# Patient Record
Sex: FEMALE | Race: WHITE | NOT HISPANIC OR LATINO | Employment: FULL TIME
[De-identification: names, ages, dates, MRNs, and addresses within clinical notes are randomized per-mention and may not be internally consistent; named-entity substitution may affect disease eponyms.]

---

## 2019-09-29 ENCOUNTER — HEALTH MAINTENANCE LETTER (OUTPATIENT)
Age: 69
End: 2019-09-29

## 2019-12-27 ENCOUNTER — ANCILLARY PROCEDURE (OUTPATIENT)
Dept: GENERAL RADIOLOGY | Facility: CLINIC | Age: 69
End: 2019-12-27
Attending: FAMILY MEDICINE
Payer: COMMERCIAL

## 2019-12-27 ENCOUNTER — OFFICE VISIT (OUTPATIENT)
Dept: ORTHOPEDICS | Facility: CLINIC | Age: 69
End: 2019-12-27
Payer: COMMERCIAL

## 2019-12-27 VITALS — SYSTOLIC BLOOD PRESSURE: 178 MMHG | HEART RATE: 99 BPM | DIASTOLIC BLOOD PRESSURE: 101 MMHG

## 2019-12-27 DIAGNOSIS — M79.645 THUMB PAIN, LEFT: ICD-10-CM

## 2019-12-27 DIAGNOSIS — S62.232A CLOSED DISPLACED FRACTURE OF BASE OF FIRST METACARPAL BONE OF LEFT HAND, UNSPECIFIED FRACTURE MORPHOLOGY, INITIAL ENCOUNTER: ICD-10-CM

## 2019-12-27 DIAGNOSIS — S62.232A OTHER CLOSED DISPLACED FRACTURE OF BASE OF FIRST METACARPAL BONE OF LEFT HAND, INITIAL ENCOUNTER: Primary | ICD-10-CM

## 2019-12-27 DIAGNOSIS — M79.645 THUMB PAIN, LEFT: Primary | ICD-10-CM

## 2019-12-27 RX ORDER — HYDROCODONE BITARTRATE AND ACETAMINOPHEN 5; 325 MG/1; MG/1
1 TABLET ORAL EVERY 6 HOURS PRN
Qty: 5 TABLET | Refills: 0 | Status: SHIPPED | OUTPATIENT
Start: 2019-12-27 | End: 2020-11-19

## 2019-12-27 RX ORDER — OXYCODONE HYDROCHLORIDE 5 MG/1
5 TABLET ORAL EVERY 6 HOURS PRN
Qty: 5 TABLET | Refills: 0 | Status: SHIPPED | OUTPATIENT
Start: 2019-12-27 | End: 2020-11-19

## 2019-12-27 RX ADMIN — LIDOCAINE HYDROCHLORIDE 10 ML: 10 INJECTION, SOLUTION EPIDURAL; INFILTRATION; INTRACAUDAL; PERINEURAL at 11:40

## 2019-12-27 NOTE — LETTER
Date:December 30, 2019      Provider requested that no letter be sent. Do not send.       [facility] Information

## 2019-12-27 NOTE — PROGRESS NOTES
"    Department of Orthopedic Surgery  Clinic Consultation Note          PATIENT NAME: Maria Luisa Garcia   MRN: [id number]  AGE: 69 year old  BMI: There is no height or weight on file to calculate BMI.  REFERRING PHYSICIAN: No ref. provider found      CHIEF COMPLAINT: Consult For (Left thumb metacarpal fracture)      HISTORY OF PRESENT ILLNESS:  Maria Luisa Garcia is a 69 year old left-hand-dominant female who presents for evaluation of left thumb injury.  She reports that 2 days ago she was home running after her-year-old grandchild to bring him to go to the bathroom when she slipped on the hardwood floors and landed on her left hand.  She reported that she had discomfort in the left thumb, but states that it was not severe enough where she was concerned about significant injury.  She attributed her discomfort likely to a \"ligament injury\" or some type of sprain, and therefore manage that conservatively.  She notes that her pain largely improved other than she attempted to lift or use the hand but was otherwise comfortable.  Given persistent discomfort, bruising and swelling, she felt it was worthwhile to come to our walk-in clinic today for evaluation and x-rays.  Seen by Dr. Pedroza, and x-rays demonstrated the displaced base of the first metacarpal fracture and asked for our assistance in management.     Patient notes that she currently does have a cold, which is had over the last couple days.  She otherwise states she is been in her usual state of health.  She reports no numbness or tingling in the thumb.  She denies any other injury or pain.  No previous injury to the thumb.  She does report a history of osteoarthritis, and does occasionally have aches and pains in the thumb but no significant imitation as result of this.  She does note recent burning with curling iron over the left volar wrist a few days ago, states has been healing up appropriately, just recently remained dry in the last day or 2, had previously " been weeping.  She does report a upcoming trip in 2-1/2 weeks to [facility] for a family wedding.      ALLERGIES: Cleocin and Codeine    MEDICATIONS:     Current Outpatient Medications:      HYDROcodone-acetaminophen (NORCO) 5-325 MG tablet, Take 1 tablet by mouth every 6 hours as needed for severe pain, Disp: 5 tablet, Rfl: 0     atovaquone-proguanil (MALARONE) 250-100 MG per tablet, Take 1 tablet by mouth daily Start 2 days before exposure to [place]aria and continue daily till  7 days after exposure. (Patient not taking: Reported on 12/27/2019), Disp: 18 tablet, Rfl: 0     calcium carbonate (OS-MARI 500 MG [place]. [place]) 500 MG tablet, Take 3,000 mg by mouth daily, Disp: , Rfl:      chloroquine (ARALEN) 500 MG tablet, Take 1 tablet (500 mg) by mouth every 7 days Take 1 tablet 1-2 weeks prior to exposure to [place]aria, start on 02/22/2016 .  Continue 1 tablet weekly until 4 weeks after exposure. (Patient not taking: Reported on 12/27/2019), Disp: 7 tablet, Rfl: 0     Cholecalciferol (VITAMIN D3 PO), Take 5,000 Units by mouth daily , Disp: , Rfl:      Coenzyme Q10 (COQ10) 400 MG CAPS, Take by mouth daily, Disp: , Rfl:      Flaxseed, Linseed, (FLAXSEED OIL) 1200 MG CAPS, Take by mouth daily, Disp: , Rfl:      Glucosamine-Chondroit-Vit C-Mn (GLUCOSAMINE CHONDR 1500 COMPLX PO), Take by mouth daily, Disp: , Rfl:      hydrocortisone 2.5 % cream, Apply topically 2 times daily (Patient not taking: Reported on 12/27/2019), Disp: 30 g, Rfl: 0     hydrOXYzine (ATARAX) 25 MG tablet, Take 1-2 tablets (25-50 mg) by mouth every 6 hours as needed for itching (and nausea) (Patient not taking: Reported on 12/27/2019), Disp: 60 tablet, Rfl: 0     MAGNESIUM OXIDE PO, Take 500 mg by mouth daily , Disp: , Rfl:      multivitamin, therapeutic with minerals (MULTI-VITAMIN) TABS, Take 1 tablet by mouth daily, Disp: , Rfl:      Omega-3 Fatty Acids (OMEGA-3 FISH OIL PO), , Disp: , Rfl:      oxyCODONE (ROXICODONE) 5 MG immediate release tablet, Take 1-2 tablets  (5-10 mg) by mouth every 4 hours as needed for pain or other (Moderate to Severe) (Patient not taking: Reported on 12/27/2019), Disp: 60 tablet, Rfl: 0     oxyCODONE (ROXICODONE) 5 MG tablet, Take 1 tablet (5 mg) by mouth every 6 hours as needed for severe pain, Disp: 5 tablet, Rfl: 0     sulfamethoxazole-trimethoprim (BACTRIM DS,SEPTRA DS) 800-160 MG per tablet, Take 1 tablet by mouth 2 times daily (Patient not taking: Reported on 12/27/2019), Disp: 20 tablet, Rfl: 0      MEDICAL HISTORY:   Past Medical History:   Diagnosis Date     Platelet function defect (H)        SURGICAL HISTORY:   Past Surgical History:   Procedure Laterality Date     ARTHROSCOPIC RECONSTRUCTION ANTERIOR CRUCIATE LIGAMENT      left     ARTHROSCOPY KNEE      right     BACK SURGERY      lumbar     CARPAL TUNNEL RELEASE RT/LT      bilateral     COLONOSCOPY       REPAIR HAMMER TOE BILATERAL Bilateral 12/10/2014    Procedure: REPAIR HAMMER TOE BILATERAL;  Surgeon: Catrachito Estrella MD;  Location: [facility]     WRIST SURGERY      right, fracture repair         FAMILY HISTORY: No family history on file.    SOCIAL HISTORY:   Social History     Tobacco Use     Smoking status: Never Smoker     Smokeless tobacco: Never Used   Substance Use Topics     Alcohol use: Yes     Comment: socially   Left-hand-dominant.  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      PHYSICAL EXAMINATION:  General: awake, alert, cooperative, no apparent distress, appears stated age, very pleasant  HEENT: normocephalic, atraumatic  Respiratory: breathing non-labored, no wheezing  Cardiovascular: nontachycardic  Skin: no rashes or lesions  Neurological: A&Ox3, CN II-XII grossly intact  Pysch: appropriate affect     Musculoskeletal:  Right Upper Extremity: Swelling and resolving ecchymosis to the base of the left thumb and migrating proximally into the distal volar wrist.  She does have tenderness palpation at the base of the thumb.  No significant tenderness  palpation distally in the thumb.  Skin is intact.  Demonstrates EPL and FPL function, limited given injury.  Otherwise distally CMS intact.  SILT ax/m/r/u nerve distributions.  Good distal perfusion to the fingers.    IMAGING:   XR 3 views left thumb obtained today were personally reviewed and demonstrate comminuted base of the first metacarpal fracture with associated angulation, predominantly flexion and abduction of the distal metacarpal shaft.  Fracture line appears to exit extra-articular at the radial base of the metacarpal.    ASSESSMENT/PLAN: Maria Luisa Garcia is a 69 year old left-hand-dominant female who presents for evaluation of left thumb injury, sustained 2 days ago, found to have comminuted extra-articular base of the thumb metacarpal fracture, closed, displaced.    We reviewed with the patient her clinical and radiographic findings today. We discussed the nature of her comminuted and displaced base of the thumb metacarpal fracture, as well as discussed treatment options including nonoperative management in the form of closed reduction and casting versus operative management, closed reduction percutaneous pinning with splint/cast immobilization.  Risks and benefits of both were discussed.  Patient at this point would like to avoid surgery, was agreeable to performing closed reduction today in clinic with thumb spica cast immobilization.  We discussed use of local anesthesia with a hematoma/field block for pain control during reduction and casting.  Risks and benefits again were discussed, she provided written informed consent to proceed with local anesthesia as well as close reduction and casting.    Using 27-gauge needle, 10 cc of 1% plain lidocaine were used for local anesthesia, needle was used to infiltrate local both ulnar and radial aspect of the base of the thumb as well as was injected into the fracture itself for hematoma block.  She tolerated this well with good subsequent anesthesia.  We then  under fluoroscopic guidance performed closed reduction and casting with a short arm thumb spica cast.  We are able to achieve good reduction and maintenance of set reduction with her cast.  Patient tolerated this well without immediately observed complications.    We discussed that at this time, we will plan to proceed with nonoperative management with cast immobilization.  We discussed that her cast should remain clean dry and intact.  She does have an upcoming visit to [place], and would therefore recommend having her follow-up in 2 weeks time for repeat x-rays of the thumb as well as cast exchange.  We discussed the option of doing a waterproof cast at that time given her upcoming visit.  We did provide her with 5 tablets of Norco as needed for pain.  She understands if there are questions or concerns in the interim, we be happy to see her back in clinic.  All questions answered.  Patient in agreement with the plan.    Patient was seen, discussed, and personally evaluated by Dr. Mcintyre who agrees with the above assessment and plan.     Jose Alanis MD  Orthopedic Surgery PGY-4     Patient was seen and examined with the resident.  I supervised the reduction.  I agree with the assessment and plan of care.

## 2019-12-27 NOTE — PROGRESS NOTES
SUBJECTIVE: Maria Luisa Garcia is a 69 year old female who was running to get 1 yo pants down so he could go potty.  She was coming in to babysit the twins two days ago at 8 a.m. She had socks on, slipped on the hardwood floor and weight went down on left hand.  Also has cold that family has had for the past 48 hours.  [place] 1[place] for a family wedding.  Never Dx with osteoporosis, no DEXA on file.  PCP passed away and will need new PCP.  Never a smoker.  Works on [employer], pre and postop, pt supervisor.  Burn on volar ulnar-sided, curling iron burn.  Non-smoker  Has high pain threshold but this seems to be more pain than usual.  Currently on cold meds and attributes that to her elevated BP    PAST MEDICAL, SOCIAL, SURGICAL AND FAMILY HISTORY: She  has a past medical history of Platelet function defect (H).  She  has a past surgical history that includes colonoscopy; carpal tunnel release rt/lt; Wrist surgery; back surgery; Arthroscopy knee; Arthroscopic reconstruction anterior cruciate ligament; and Repair hammer toe bilateral (Bilateral, 12/10/2014).  Her family history is not on file.  She reports that she has never smoked. She has never used smokeless tobacco. She reports current alcohol use. She reports that she does not use drugs.      ALLERGIES: She is allergic to cleocin and codeine.    CURRENT MEDICATIONS: She has a current medication list which includes the following prescription(s): calcium carbonate, cholecalciferol, coq10, atovaquone-proguanil, chloroquine, flaxseed oil, glucosamine-chondroit-vit c-mn, hydrocortisone, hydroxyzine, magnesium oxide, multivitamin w/minerals, omega-3 fatty acids, oxycodone, and sulfamethoxazole-trimethoprim.     REVIEW OF SYSTEMS: 10 point review of systems is negative except as noted above.    EXAM:  BP (!) 178/101 (BP Location: Right arm, Patient Position: Sitting, Cuff Size: Adult Regular)   Pulse 99   CONSTITUTIONIAL: healthy, alert and moderate distress  HEAD:  Normocephalic. No masses, lesions, tenderness or abnormalities  ENT: ENT exam normal, no neck nodes or sinus tenderness  SKIN: no suspicious lesions or rashes  GAIT: normal  Stance: normal  NEUROLOGIC: Normal muscle tone and strength, reflexes normal, sensation grossly normal.  PSYCHIATRIC: affect normal/bright and mentation appears normal.    MUSCULOSKELETAL: left thumb pain  Inspection:Thumb:  marked swelling, curling iron burn on volar wrist, ecchymosis volar wrist  Tender: Thumb:   MCP joint  Non-tender: All Normal except thumb as above, non-tender in wrist area  Range of Motion Thumb:   flexion MCP   decreased, extension MCP   decreased  Strength: decreased, reduced pinch strength  Special tests: vascular intact, no numbness noted            ASSESSMENT/PLAN:  Pt is a 70 yo white female with PMhx of hammer toe correction presenting with acute left thumb injury  1. Left thumb pain due to comminuted fracture- Dr. Mcintyre with Hand surgery informed, hematoma block placed by ortho  Pt taken for reduction by Fluoro, casting and will f/u with Ortho prior to her trip to [facility]  [place] #5 given just in case pain is increasing  Elevation  RTC to see ortho for repeat x-rays, possible water proof cast    X-RAY INTERPRETATION:   X-Ray of the Left Hand/Fingers: 3-view, thumb  ordered and interpreted in the office today was positive for IMPRESSION:   1. Comminuted fracture base first metacarpal with palmar angulation  distal fragment best seen on the lateral film.  2. Osteoarthritis IP joint thumb with mild osteoarthritis first MCP  joint.  3. Tear of scapholunate interosseous ligament with widening of the  distance between the scaphoid and lunate.

## 2019-12-27 NOTE — PROGRESS NOTES
Hand / Upper Extremity Injection/Arthrocentesis  Date/Time: 2019 11:40 AM  Performed by: Jose Alanis MD  Authorized by: Sergey Mcintyre MD     Indications comment:  Fracture  Needle Size:  27 G  Guidance: landmark     Condition comment:  Left thumb metacarpal fracture (block).    Medications:  10 mL lidocaine (PF) 1 %  Outcome:  Tolerated well, no immediate complications  Procedure discussed: discussed risks, benefits, and alternatives    Consent Given by:  Patient  Timeout: timeout called immediately prior to procedure    Prep: patient was prepped and draped in usual sterile fashion    Cast/splint application  Date/Time: 2019 11:43 AM  Performed by: Jose Alanis MD  Authorized by: Sergey Mcintyre MD     Consent:     Consent obtained:  Verbal    Consent given by:  Patient  Pre-procedure details:     Sensation:  Normal  Procedure details:     Laterality:  Left    Location:  Hand    Cast type:  Thumb spica    Supplies:  Fiberglass  Post-procedure details:     Pain:  Unchanged    Sensation:  Normal    Patient tolerance of procedure:  Tolerated well, no immediate complications    Patient provided with cast or splint care instructions: Yes        [facility]  [address] [place] [id number]  [phone]  Dept: [phone]  ______________________________________________________________________________    Patient: Maria Luisa Garcia   : 1950   MRN: [id number]   2019    INVASIVE PROCEDURE SAFETY CHECKLIST    Date: 2019   Procedure: Block for reduction of left thumb metacarpal fracture  Patient Name: Maria Luisa Garcia  MRN: [id number]  YOB: 1950    Action: Complete sections as appropriate. Any discrepancy results in a HARD COPY until resolved.     PRE PROCEDURE:  Patient ID verified with 2 identifiers (name and  or MRN): Yes  Procedure and site verified with patient/designee (when able):  Yes  Accurate consent documentation in medical record: Yes  H&P (or appropriate assessment) documented in medical record: Yes  H&P must be up to 20 days prior to procedure and updates within 24 hours of procedure as applicable: Yes  Relevant diagnostic and radiology test results appropriately labeled and displayed as applicable: Yes  Procedure site(s) marked with provider initials: Yes    TIMEOUT:  Time-Out performed immediately prior to starting procedure, including verbal and active participation of all team members addressing the following:Yes  * Correct patient identify  * Confirmed that the correct side and site are marked  * An accurate procedure consent form  * Agreement on the procedure to be done  * Correct patient position  * Relevant images and results are properly labeled and appropriately displayed  * The need to administer antibiotics or fluids for irrigation purposes during the procedure as applicable   * Safety precautions based on patient history or medication use    DURING PROCEDURE: Verification of correct person, site, and procedures any time the responsibility for care of the patient is transferred to another member of the care team.       The following medications were given:         Prior to injection, verified patient identity using patient's name and date of birth.  Due to injection administration, patient instructed to remain in clinic for 15 minutes  afterwards, and to report any adverse reaction to me immediately.    Joint injection was performed.    Medication Name: Lidocaine NDC: [id number]  Drug Amount Wasted:  Yes: 10 mg/ml   Vial/Syringe: Single dose vial  Expiration Date:  4/1/23        Kaur Maynard ATC    Patient was seen and examined with the resident.  The reduction was supervised by myself.  I agree with the assessment and plan of care.

## 2019-12-27 NOTE — LETTER
Date:December 30, 2019      Patient was self referred, no letter generated. Do not send.        [facility] Information

## 2019-12-27 NOTE — LETTER
12/27/2019       RE: Maria Luisa Garcia  [address] [place] [address]     Dear Colleague,    Thank you for referring your patient, Maria Luisa Garcia, to the [facility] SPORTS AND ORTHOPAEDIC WALK IN CLINIC at [facility]. Please see a copy of my visit note below.          SPORTS & ORTHOPEDIC WALK-IN VISIT 12/27/2019    Primary Care Physician: No current PCP    Reason for visit:     What part of your body is injured / painful?  left proximal thumb    What caused the injury /pain? Fall onto left thumb when she slipped on hardware floor    How long ago did your injury occur or pain begin? 2 day(s)    What are your most bothersome symptoms? Pain, Swelling and Inability to perform ADLs    How would you characterize your symptom?  dull    What makes your symptoms better? Ice and elevation    What makes your symptoms worse? Movement of thumb     Have you been previously seen for this problem? No    Medical History:    Any recent changes to your medical history? No    Any new medication prescribed since last visit? N/A    Have you had surgery on this body part before? No    Social History:    Occupation: Patient care supervisor in [employer]    Handedness: Left    Exercise: Walking and weights    Review of Systems:    Do you have fever, chills, weight loss? No    Do you have any vision problems? No    Do you have any chest pain or edema? No    Do you have any shortness of breath or wheezing?  No    Do you have stomach problems? No    Do you have any numbness or focal weakness? No    Do you have diabetes? No    Do you have problems with bleeding or clotting? Yes, platelet disorder     Do you have an rashes or other skin lesions? Yes, burn on left wrist           SUBJECTIVE: Maria Luisa Garcia is a 69 year old female who was running to get 1 yo pants down so he could go potty.  She was coming in to babysit the twins two days ago at 8 a.m. She had socks on, slipped on the hardwood floor  and weight went down on left hand.  Also has cold that family has had for the past 48 hours.  [place] a family wedding.  Never Dx with osteoporosis, no DEXA on file.  PCP passed away and will need new PCP.  Never a smoker.  Works on [employer], pre and postop, pt supervisor.  Burn on volar ulnar-sided, curling iron burn.  Non-smoker  Has high pain threshold but this seems to be more pain than usual.  Currently on cold meds and attributes that to her elevated BP    PAST MEDICAL, SOCIAL, SURGICAL AND FAMILY HISTORY: She  has a past medical history of Platelet function defect (H).  She  has a past surgical history that includes colonoscopy; carpal tunnel release rt/lt; Wrist surgery; back surgery; Arthroscopy knee; Arthroscopic reconstruction anterior cruciate ligament; and Repair hammer toe bilateral (Bilateral, 12/10/2014).  Her family history is not on file.  She reports that she has never smoked. She has never used smokeless tobacco. She reports current alcohol use. She reports that she does not use drugs.      ALLERGIES: She is allergic to cleocin and codeine.    CURRENT MEDICATIONS: She has a current medication list which includes the following prescription(s): calcium carbonate, cholecalciferol, coq10, atovaquone-proguanil, chloroquine, flaxseed oil, glucosamine-chondroit-vit c-mn, hydrocortisone, hydroxyzine, magnesium oxide, multivitamin w/minerals, omega-3 fatty acids, oxycodone, and sulfamethoxazole-trimethoprim.     REVIEW OF SYSTEMS: 10 point review of systems is negative except as noted above.    EXAM:  BP (!) 178/101 (BP Location: Right arm, Patient Position: Sitting, Cuff Size: Adult Regular)   Pulse 99   CONSTITUTIONIAL: healthy, alert and moderate distress  HEAD: Normocephalic. No masses, lesions, tenderness or abnormalities  ENT: ENT exam normal, no neck nodes or sinus tenderness  SKIN: no suspicious lesions or rashes  GAIT: normal  Stance: normal  NEUROLOGIC: Normal muscle tone and  strength, reflexes normal, sensation grossly normal.  PSYCHIATRIC: affect normal/bright and mentation appears normal.    MUSCULOSKELETAL: left thumb pain  Inspection:Thumb:  marked swelling, curling iron burn on volar wrist, ecchymosis volar wrist  Tender: Thumb:   MCP joint  Non-tender: All Normal except thumb as above, non-tender in wrist area  Range of Motion Thumb:   flexion MCP   decreased, extension MCP   decreased  Strength: decreased, reduced pinch strength  Special tests: vascular intact, no numbness noted            ASSESSMENT/PLAN:  Pt is a 68 yo white female with PMhx of hammer toe correction presenting with acute left thumb injury  1. Left thumb pain due to comminuted fracture- Dr. Mcintyre with Hand surgery informed, hematoma block placed by ortho  Pt taken for reduction by Fluoro, casting and will f/u with Ortho prior to her trip to [facility] #5 given just in case pain is increasing  Elevation  RTC to see ortho for repeat x-rays, possible water proof cast    X-RAY INTERPRETATION:   X-Ray of the Left Hand/Fingers: 3-view, thumb  ordered and interpreted in the office today was positive for IMPRESSION:   1. Comminuted fracture base first metacarpal with palmar angulation  distal fragment best seen on the lateral film.  2. Osteoarthritis IP joint thumb with mild osteoarthritis first MCP  joint.  3. Tear of scapholunate interosseous ligament with widening of the  distance between the scaphoid and lunate.    Again, thank you for allowing me to participate in the care of your patient.      Sincerely,    Shakila Pedroza MD
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
## 2019-12-28 RX ORDER — LIDOCAINE HYDROCHLORIDE 10 MG/ML
10 INJECTION, SOLUTION EPIDURAL; INFILTRATION; INTRACAUDAL; PERINEURAL
Status: DISCONTINUED | OUTPATIENT
Start: 2019-12-27 | End: 2021-01-08

## 2019-12-30 ENCOUNTER — DOCUMENTATION ONLY (OUTPATIENT)
Dept: CARE COORDINATION | Facility: CLINIC | Age: 69
End: 2019-12-30

## 2020-01-08 DIAGNOSIS — S62.232A OTHER CLOSED DISPLACED FRACTURE OF BASE OF FIRST METACARPAL BONE OF LEFT HAND, INITIAL ENCOUNTER: Primary | ICD-10-CM

## 2020-01-10 ENCOUNTER — ANCILLARY PROCEDURE (OUTPATIENT)
Dept: GENERAL RADIOLOGY | Facility: CLINIC | Age: 70
End: 2020-01-10
Attending: ORTHOPAEDIC SURGERY
Payer: COMMERCIAL

## 2020-01-10 ENCOUNTER — OFFICE VISIT (OUTPATIENT)
Dept: ORTHOPEDICS | Facility: CLINIC | Age: 70
End: 2020-01-10
Payer: COMMERCIAL

## 2020-01-10 DIAGNOSIS — S62.232A OTHER CLOSED DISPLACED FRACTURE OF BASE OF FIRST METACARPAL BONE OF LEFT HAND, INITIAL ENCOUNTER: ICD-10-CM

## 2020-01-10 DIAGNOSIS — S62.232A OTHER CLOSED DISPLACED FRACTURE OF BASE OF FIRST METACARPAL BONE OF LEFT HAND, INITIAL ENCOUNTER: Primary | ICD-10-CM

## 2020-01-10 ASSESSMENT — PATIENT HEALTH QUESTIONNAIRE - PHQ9
10. IF YOU CHECKED OFF ANY PROBLEMS, HOW DIFFICULT HAVE THESE PROBLEMS MADE IT FOR YOU TO DO YOUR WORK, TAKE CARE OF THINGS AT HOME, OR GET ALONG WITH OTHER PEOPLE: NOT DIFFICULT AT ALL
SUM OF ALL RESPONSES TO PHQ QUESTIONS 1-9: 0
SUM OF ALL RESPONSES TO PHQ QUESTIONS 1-9: 0

## 2020-01-10 NOTE — PROGRESS NOTES
Follow-up left thumb metacarpal base fracture.  Status post closed reduction.  She is been doing reasonably well in the cast.  No further pain.  No further trauma.  Denies numbness or tingling.    The past medical history was reviewed and documented in the chart.  This includes medications, surgeries, social history as well as review of systems.    Physical examination of the left upper extremity demonstrates her to be in a forearm-based thumb spica cast.  She has full sensation in the digits, full motion.  Capillary refill is brisk.    X-rays were taken today in the cast, AP, lateral, oblique.  They show maintenance of the reduction in acceptable position.    Impression: Status post closed reduction left metacarpal base fracture, metaphysis    Plan: We change the cast out to a waterproof cast today.  She is leaving to [place] in the next couple of days.  New x-rays were taken in the new cast which demonstrate acceptable alignment of the metacarpal fracture.  Although the cast was a bit tight and she was having some paresthesias in her hand.  Therefore the cast was changed over to a standard, non-waterproof thumb spica cast.  This was much more comfortable.  The thumb was held in the same position and she tolerated this without any difficulty.  We will plan on seeing her back in 2 to 3 weeks or when she returns from [place] with new x-rays.  Likely into a thumb spica OT splint at that point.  Answers for HPI/ROS submitted by the patient on 1/10/2020   If you checked off any problems, how difficult have these problems made it for you to do your work, take care of things at home, or get along with other people?: Not difficult at all  PHQ9 TOTAL SCORE: 0    Cast/splint application  Date/Time: 1/10/2020 11:18 AM  Performed by: Kaur Maynard ATC  Authorized by: Sergey Mcintyre MD     Consent:     Consent obtained:  Verbal    Consent given by:  Patient  Procedure details:     Laterality:  Left    Location:  Hand     Cast type:  Thumb spica    Supplies:  Fiberglass  Post-procedure details:     Pain:  Unchanged    Sensation:  Normal    Patient tolerance of procedure:  Tolerated well, no immediate complications    Patient provided with cast or splint care instructions: Yes

## 2020-01-10 NOTE — NURSING NOTE
Cast removal:    Relevant Diagnosis: Left thumb metacarpal fracture.    Patient educated on cast removal process: Yes     Thumb spica cast was removed per physician instruction.    Skin was observed and found to be intact with no signs of concern:Yes     Concern noted: None.     Person(s) involved in removal:   Patient     Questions asked: None.    Patient sent to x-ray: No, explain: Patient had xray in cast, and will have another xray after new cast is placed.

## 2020-01-10 NOTE — LETTER
Date:January 15, 2020      Patient was self referred, no letter generated. Do not send.        [facility] Information

## 2020-01-10 NOTE — LETTER
1/10/2020       RE: Maria Luisa Garcia  [address] [place] [address]     Dear Colleague,    Thank you for referring your patient, Maria Luisa Garcia, to the [facility]THOP[facility]ED[facility] at [facility]. Please see a copy of my visit note below.    Follow-up left thumb metacarpal base fracture.  Status post closed reduction.  She is been doing reasonably well in the cast.  No further pain.  No further trauma.  Denies numbness or tingling.    The past medical history was reviewed and documented in the chart.  This includes medications, surgeries, social history as well as review of systems.    Physical examination of the left upper extremity demonstrates her to be in a forearm-based thumb spica cast.  She has full sensation in the digits, full motion.  Capillary refill is brisk.    X-rays were taken today in the cast, AP, lateral, oblique.  They show maintenance of the reduction in acceptable position.    Impression: Status post closed reduction left metacarpal base fracture, metaphysis    Plan: We change the cast out to a waterproof cast today.  She is leaving to [place] in the next couple of days.  New x-rays were taken in the new cast which demonstrate acceptable alignment of the metacarpal fracture.  Although the cast was a bit tight and she was having some paresthesias in her hand.  Therefore the cast was changed over to a standard, non-waterproof thumb spica cast.  This was much more comfortable.  The thumb was held in the same position and she tolerated this without any difficulty.  We will plan on seeing her back in 2 to 3 weeks or when she returns from [place] with new x-rays.  Likely into a thumb spica OT splint at that point.  Answers for HPI/ROS submitted by the patient on 1/10/2020   If you checked off any problems, how difficult have these problems made it for you to do your work, take care of things at home, or get along with other people?: Not difficult at all  PHQ9  TOTAL SCORE: 0    Cast/splint application  Date/Time: 1/10/2020 11:18 AM  Performed by: Kaur Maynard ATC  Authorized by: Sergey Mcintyre MD     Consent:     Consent obtained:  Verbal    Consent given by:  Patient  Procedure details:     Laterality:  Left    Location:  Hand    Cast type:  Thumb spica    Supplies:  Fiberglass  Post-procedure details:     Pain:  Unchanged    Sensation:  Normal    Patient tolerance of procedure:  Tolerated well, no immediate complications    Patient provided with cast or splint care instructions: Yes            Again, thank you for allowing me to participate in the care of your patient.      Sincerely,    Sergey Mcintyre MD

## 2020-01-10 NOTE — NURSING NOTE
Reason For Visit:   Chief Complaint   Patient presents with     Follow Up     Left thumb metacarpal fracture, DOI: 12-       Pain Assessment  Patient Currently in Pain: Denies        Allergies   Allergen Reactions     Cleocin Anaphylaxis     Codeine Nausea and Vomiting           Luz Martin LPN

## 2020-01-11 ASSESSMENT — PATIENT HEALTH QUESTIONNAIRE - PHQ9: SUM OF ALL RESPONSES TO PHQ QUESTIONS 1-9: 0

## 2020-01-29 DIAGNOSIS — S62.232A OTHER CLOSED DISPLACED FRACTURE OF BASE OF FIRST METACARPAL BONE OF LEFT HAND, INITIAL ENCOUNTER: Primary | ICD-10-CM

## 2020-01-31 ENCOUNTER — OFFICE VISIT (OUTPATIENT)
Dept: ORTHOPEDICS | Facility: CLINIC | Age: 70
End: 2020-01-31
Payer: COMMERCIAL

## 2020-01-31 ENCOUNTER — ANCILLARY PROCEDURE (OUTPATIENT)
Dept: GENERAL RADIOLOGY | Facility: CLINIC | Age: 70
End: 2020-01-31
Attending: ORTHOPAEDIC SURGERY
Payer: COMMERCIAL

## 2020-01-31 ENCOUNTER — THERAPY VISIT (OUTPATIENT)
Dept: OCCUPATIONAL THERAPY | Facility: CLINIC | Age: 70
End: 2020-01-31
Attending: ORTHOPAEDIC SURGERY
Payer: COMMERCIAL

## 2020-01-31 DIAGNOSIS — M25.642 STIFFNESS OF FINGER JOINT OF LEFT HAND: ICD-10-CM

## 2020-01-31 DIAGNOSIS — S62.232A OTHER CLOSED DISPLACED FRACTURE OF BASE OF FIRST METACARPAL BONE OF LEFT HAND, INITIAL ENCOUNTER: ICD-10-CM

## 2020-01-31 DIAGNOSIS — S62.232D OTHER CLOSED DISPLACED FRACTURE OF BASE OF FIRST METACARPAL BONE OF LEFT HAND WITH ROUTINE HEALING, SUBSEQUENT ENCOUNTER: Primary | ICD-10-CM

## 2020-01-31 DIAGNOSIS — M79.645 THUMB PAIN, LEFT: Primary | ICD-10-CM

## 2020-01-31 PROCEDURE — 97165 OT EVAL LOW COMPLEX 30 MIN: CPT | Mod: GO | Performed by: OCCUPATIONAL THERAPIST

## 2020-01-31 PROCEDURE — 97760 ORTHOTIC MGMT&TRAING 1ST ENC: CPT | Mod: GO | Performed by: OCCUPATIONAL THERAPIST

## 2020-01-31 PROCEDURE — 97110 THERAPEUTIC EXERCISES: CPT | Mod: GO | Performed by: OCCUPATIONAL THERAPIST

## 2020-01-31 NOTE — PROGRESS NOTES
Follow-up left thumb metacarpal fracture.  She is been casted.  She is doing well.  Minimal pain at this point.  No other injuries.  No fevers or chills.  Cast is removed this morning.    The past medical history was reviewed and documented in the chart.  This includes medications, surgeries, social history as well as review of systems.    Physical examination left hand demonstrates very little tenderness over the thumb metacarpal.  Mild tenderness over the dorsal wrist, wrist range of motion limited as his thumb CMC, MP joint and IP joint.  No motor, no sensory deficits are noted.  No significant atrophy.  There is brisk capillary refill in all digits and a palpable radial pulse.  No overlying skin changes noted.      X-rays are taken today of the thumb, AP, lateral, [place].  The thumb metacarpal base fracture is healing.  There is callus formation.  Overall alignment is acceptable.  Certainly arthritic changes though at the CMC joint, MP joint, IP joint of the thumb.    Impression: Left thumb metacarpal fracture status post closed reduction, healing, diffuse hand osteoarthritis    Plan: We did review the x-rays with her this morning.  She can go into a therapy splint now, hand-based thumb spica with the IP joint free.  She can work on range of motion and we went over those exercises today.  Increase activities as tolerated.  I will see her back in 4 to 6 weeks with final x-rays or sooner if need be.

## 2020-01-31 NOTE — PROGRESS NOTES
Reason For Visit:   Chief Complaint   Patient presents with     RECHECK     Left thumb metacarpal fracture follow up       Primary MD: No primary care provider on file.    Age: 69 year old    ?  No      There were no vitals taken for this visit.      Pain Assessment  Patient Currently in Pain: Yes  0-10 Pain Scale: 3               QuickDASH Assessment  QuickDASH Main 11/24/2014   1. Open a tight or new jar. 3   2. Do heavy household chores (e.g., wash walls, floors). 2   3. Carry a shopping bag or briefcase. 2   4. Wash your back. 1   5. Use a knife to cut food. 1   6. Recreational activities in which you take some force or impact through your arm, shoulder or hand (e.g., golf, hammering, tennis, etc.). 2   7. During the past week, to what extent has your arm, shoulder or hand problem interfered with your normal social activities with family, friends, neighbours or groups? 3   8. During the past week, were you limited in your work or other regular daily activities as a result of your arm, shoulder or hand problem? 2   9. Arm, shoulder or hand pain. 3   10. Tingling (pins and needles) in your arm,shoulder or hand. 1   11. During the past week, how much difficulty have you had sleeping because of the pain in your arm, shoulder or hand? ([place] number) 1   SUM: 21          Current Outpatient Medications   Medication Sig Dispense Refill     calcium carbonate (OS-MARI 500 MG [place]. [place]) 500 MG tablet Take 3,000 mg by mouth daily       Cholecalciferol (VITAMIN D3 PO) Take 5,000 Units by mouth daily        Coenzyme Q10 (COQ10) 400 MG CAPS Take by mouth daily       Flaxseed, Linseed, (FLAXSEED OIL) 1200 MG CAPS Take by mouth daily       Glucosamine-Chondroit-Vit C-Mn (GLUCOSAMINE CHONDR 1500 COMPLX PO) Take by mouth daily       HYDROcodone-acetaminophen (NORCO) 5-325 MG tablet Take 1 tablet by mouth every 6 hours as needed for severe pain 5 tablet 0     MAGNESIUM OXIDE PO Take 500 mg by mouth daily         multivitamin, therapeutic with minerals (MULTI-VITAMIN) TABS Take 1 tablet by mouth daily       Omega-3 Fatty Acids (OMEGA-3 FISH OIL PO)        oxyCODONE (ROXICODONE) 5 MG tablet Take 1 tablet (5 mg) by mouth every 6 hours as needed for severe pain 5 tablet 0     atovaquone-proguanil (MALARONE) 250-100 MG per tablet Take 1 tablet by mouth daily Start 2 days before exposure to Malaria and continue daily till  7 days after exposure. (Patient not taking: Reported on 12/27/2019) 18 tablet 0     chloroquine (ARALEN) 500 MG tablet Take 1 tablet (500 mg) by mouth every 7 days Take 1 tablet 1-2 weeks prior to exposure to Malaria, start on 02/22/2016 .  Continue 1 tablet weekly until 4 weeks after exposure. (Patient not taking: Reported on 12/27/2019) 7 tablet 0     hydrocortisone 2.5 % cream Apply topically 2 times daily (Patient not taking: Reported on 12/27/2019) 30 g 0     hydrOXYzine (ATARAX) 25 MG tablet Take 1-2 tablets (25-50 mg) by mouth every 6 hours as needed for itching (and nausea) (Patient not taking: Reported on 12/27/2019) 60 tablet 0     oxyCODONE (ROXICODONE) 5 MG immediate release tablet Take 1-2 tablets (5-10 mg) by mouth every 4 hours as needed for pain or other (Moderate to Severe) (Patient not taking: Reported on 12/27/2019) 60 tablet 0     sulfamethoxazole-trimethoprim (BACTRIM DS,SEPTRA DS) 800-160 MG per tablet Take 1 tablet by mouth 2 times daily (Patient not taking: Reported on 12/27/2019) 20 tablet 0       Allergies   Allergen Reactions     Cleocin Anaphylaxis     Codeine Nausea and Vomiting       Kaur Maynard, ATC

## 2020-01-31 NOTE — LETTER
Date:February 4, 2020      Patient was self referred, no letter generated. Do not send.        [facility] Health Information

## 2020-01-31 NOTE — PROGRESS NOTES
Hand Therapy Initial Evaluation    Current Date:  1/31/2020    Diagnosis:  Left  thumb Metacarpal Fracture, base of thumb  DOI:  12/2519    Referring MD: Sergey Mcintyre MD    Next MD visit: 2/28/20      Initial Subjective:  Maria Luisa Garcia is a 69 year old  left  hand dominant female.    Patient reports symptoms of pain, stiffness/loss of motion, weakness/loss of strength and edema of the left thumb which occurred due to a fall at home. Since onset symptoms are Gradually getting better.  Special tests:  x-ray.  Previous treatment: out of cast today.    General health as reported by patient is excellent.  Pertinent medical history includes:History of Fractures, Overweight  Medical allergies:See EMR.  Surgical history: orthopedic: L wrist fracture and L knee surgery and R knee surgery, Lumber decompression.  Medication history: None.  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  Job Tasks: Computer Work, Lifting, Carrying, Prolonged Sitting, Prolonged Standing, Pushing, Pulling, Repetitive Tasks  Prior functional level:  no limitations  Barriers include:none, lives alone  Mobility: No difficulty  Transportation: drives  Leisure activities/hobbies: babysitting and traveling, skiing, boating      Functional Outcome Measure:  See flowsheet      Objective:  Observation: Color/Temperature:     [x]    Normal  dry, scaley skin  Pain Level (Scale 0-10):   1/31/2020   At Rest 0   With Use 6     Pain Description:  Date 1/31/2020    left   Location  thumb   Pain Quality Aching, Sharp, Stabbing and Tender   Frequency daily     Pain is worst  daytime   Exacerbated by  moving too much   Relieved by rest   Progression Gradually getting better.        ROM:    Wrist 1/31/2020   AROM(PROM) Left   Extension 45   Flexion 45   RD 20   UD 35   Supination WNL   Pronation WNL     ROM:  Thumb  1/31/2020 1/31/2020   AROM(PROM) Right Left   MP  HE/35   IP  0/20   RAbd  55   PAbd  60   Retropulsion     Kapandji Opposition Scale (0-10/10)  4       Strength:  [x]   Contraindicated  Edema:  mild of the  hand and thumb  Palpation:  []     Normal        [x]   Tender       []  Mild         [x]   Moderate []   Severe   Location: base of L thumb      Sensation:  WNL throughout all nerve distributions; per patient report      Assessment:   Patient presents with symptoms consistent with above diagnosis,  with non-surgical intervention.     Patient's limitations or Problem List includes:  Pain, Decreased ROM/motion, Increased edema and Weakness of the left wrist and thumb which interferes with the patient's ability to perform Self Care Tasks (dressing, eating, bathing, hygiene/toileting), Work Tasks, Recreational Activities, Household Chores and Driving  as compared to previous level of function.    Rehab Potential:  Excellent - Return to full activity, no limitations    Patient will benefit from skilled Occupational Therapy to increase ROM, flexibility, overall strength,  strength, pinch strength, stability of wrist, stability of thumb and coordination and decrease pain and edema to return to previous activity level and resume normal daily tasks and to reach their rehab potential.    Barriers to Learning:  No barrier    Communication Issues:  Patient appears to be able to clearly communicate and understand verbal and written communication and follow directions correctly.  Chart Review: Chart Review, Brief history including review of medical and/or therapy records relating to the presenting problem and Simple history review with patient    Identified Performance Deficits: bathing/showering, toileting, dressing, feeding, hygiene and grooming, home establishment and management, meal preparation and cleanup, shopping, work, play and leisure activities    Assessment of Occupational Performance:  5 or more Performance  Deficits    Clinical Decision Making (Complexity): Low complexity    Treatment Explanation:  The following has been discussed with the patient:  RX ordered/plan of care  Anticipated outcomes  Possible risks and side effects    Treatment Plan:   Frequency:  1 X week, once daily  Duration:  for 6 weeks     Modalities:  Fluidotherapy and Paraffin  Therapeutic Exercise:  AROM  Neuromuscular re-education:  Kinesthetic Training, Proprioceptive Training and Stabilization  Manual Techniques:  Myofascial release  Self Care:  Ergonomic Considerations    Orthotic Fabrication: Static hand based thumb spica orthosis, IP free    Discharge Plan:  Achieve all LTG  [place] in home treatment program.  Reach maximal therapeutic benefit.  Please refer to the daily flowsheet for treatment today and total treatment time.      Home Exercise Program:  Thumb and wrist AROM all planes  Wear HBTSS full time, removed for hygiene, exercises    Next Visit:  Progress ROM

## 2020-01-31 NOTE — LETTER
1/31/2020       RE: Maria Luisa Garcia  [address] [place] [address]     Dear Colleague,    Thank you for referring your patient, Maria Luisa Garcia, to the [facility] at [facility]. Please see a copy of my visit note below.    Reason For Visit:   Chief Complaint   Patient presents with     RECHECK     Left thumb metacarpal fracture follow up       Primary MD: No primary care provider on file.    Age: 69 year old    ?  No      There were no vitals taken for this visit.      Pain Assessment  Patient Currently in Pain: Yes  0-10 Pain Scale: 3               QuickDASH Assessment  QuickDASH Main 11/24/2014   1. Open a tight or new jar. 3   2. Do heavy household chores (e.g., wash walls, floors). 2   3. Carry a shopping bag or briefcase. 2   4. Wash your back. 1   5. Use a knife to cut food. 1   6. Recreational activities in which you take some force or impact through your arm, shoulder or hand (e.g., golf, hammering, tennis, etc.). 2   7. During the past week, to what extent has your arm, shoulder or hand problem interfered with your normal social activities with family, friends, neighbours or groups? 3   8. During the past week, were you limited in your work or other regular daily activities as a result of your arm, shoulder or hand problem? 2   9. Arm, shoulder or hand pain. 3   10. Tingling (pins and needles) in your arm,shoulder or hand. 1   11. During the past week, how much difficulty have you had sleeping because of the pain in your arm, shoulder or hand? ([place] number) 1   SUM: 21          Current Outpatient Medications   Medication Sig Dispense Refill     calcium carbonate (OS-MARI 500 MG [place]. [place]) 500 MG tablet Take 3,000 mg by mouth daily       Cholecalciferol (VITAMIN D3 PO) Take 5,000 Units by mouth daily        Coenzyme Q10 (COQ10) 400 MG CAPS Take by mouth daily       Flaxseed, Linseed, (FLAXSEED OIL) 1200 MG CAPS Take by mouth daily        Glucosamine-Chondroit-Vit C-Mn (GLUCOSAMINE CHONDR 1500 COMPLX PO) Take by mouth daily       HYDROcodone-acetaminophen (NORCO) 5-325 MG tablet Take 1 tablet by mouth every 6 hours as needed for severe pain 5 tablet 0     MAGNESIUM OXIDE PO Take 500 mg by mouth daily        multivitamin, therapeutic with minerals (MULTI-VITAMIN) TABS Take 1 tablet by mouth daily       Omega-3 Fatty Acids (OMEGA-3 FISH OIL PO)        oxyCODONE (ROXICODONE) 5 MG tablet Take 1 tablet (5 mg) by mouth every 6 hours as needed for severe pain 5 tablet 0     atovaquone-proguanil (MALARONE) 250-100 MG per tablet Take 1 tablet by mouth daily Start 2 days before exposure to Malaria and continue daily till  7 days after exposure. (Patient not taking: Reported on 12/27/2019) 18 tablet 0     chloroquine (ARALEN) 500 MG tablet Take 1 tablet (500 mg) by mouth every 7 days Take 1 tablet 1-2 weeks prior to exposure to Malaria, start on 02/22/2016 .  Continue 1 tablet weekly until 4 weeks after exposure. (Patient not taking: Reported on 12/27/2019) 7 tablet 0     hydrocortisone 2.5 % cream Apply topically 2 times daily (Patient not taking: Reported on 12/27/2019) 30 g 0     hydrOXYzine (ATARAX) 25 MG tablet Take 1-2 tablets (25-50 mg) by mouth every 6 hours as needed for itching (and nausea) (Patient not taking: Reported on 12/27/2019) 60 tablet 0     oxyCODONE (ROXICODONE) 5 MG immediate release tablet Take 1-2 tablets (5-10 mg) by mouth every 4 hours as needed for pain or other (Moderate to Severe) (Patient not taking: Reported on 12/27/2019) 60 tablet 0     sulfamethoxazole-trimethoprim (BACTRIM DS,SEPTRA DS) 800-160 MG per tablet Take 1 tablet by mouth 2 times daily (Patient not taking: Reported on 12/27/2019) 20 tablet 0       Allergies   Allergen Reactions     Cleocin Anaphylaxis     Codeine Nausea and Vomiting       Kaur Maynard ATC      Follow-up left thumb metacarpal fracture.  She is been casted.  She is doing well.  Minimal pain at this  point.  No other injuries.  No fevers or chills.  Cast is removed this morning.    The past medical history was reviewed and documented in the chart.  This includes medications, surgeries, social history as well as review of systems.    Physical examination left hand demonstrates very little tenderness over the thumb metacarpal.  Mild tenderness over the dorsal wrist, wrist range of motion limited as his thumb CMC, MP joint and IP joint.  No motor, no sensory deficits are noted.  No significant atrophy.  There is brisk capillary refill in all digits and a palpable radial pulse.  No overlying skin changes noted.      X-rays are taken today of the thumb, AP, lateral, [place].  The thumb metacarpal base fracture is healing.  There is callus formation.  Overall alignment is acceptable.  Certainly arthritic changes though at the CMC joint, MP joint, IP joint of the thumb.    Impression: Left thumb metacarpal fracture status post closed reduction, healing, diffuse hand osteoarthritis    Plan: We did review the x-rays with her this morning.  She can go into a therapy splint now, hand-based thumb spica with the IP joint free.  She can work on range of motion and we went over those exercises today.  Increase activities as tolerated.  I will see her back in 4 to 6 weeks with final x-rays or sooner if need be.    Again, thank you for allowing me to participate in the care of your patient.      Sincerely,    Sergey Mcintyre MD

## 2020-02-07 ENCOUNTER — THERAPY VISIT (OUTPATIENT)
Dept: OCCUPATIONAL THERAPY | Facility: CLINIC | Age: 70
End: 2020-02-07
Payer: COMMERCIAL

## 2020-02-07 DIAGNOSIS — M25.642 STIFFNESS OF FINGER JOINT OF LEFT HAND: Primary | ICD-10-CM

## 2020-02-07 DIAGNOSIS — M79.645 THUMB PAIN, LEFT: ICD-10-CM

## 2020-02-07 PROCEDURE — 97035 APP MDLTY 1+ULTRASOUND EA 15: CPT | Mod: GO | Performed by: OCCUPATIONAL THERAPIST

## 2020-02-07 PROCEDURE — 97110 THERAPEUTIC EXERCISES: CPT | Mod: GO | Performed by: OCCUPATIONAL THERAPIST

## 2020-02-07 PROCEDURE — 97763 ORTHC/PROSTC MGMT SBSQ ENC: CPT | Mod: GO | Performed by: OCCUPATIONAL THERAPIST

## 2020-02-07 NOTE — PROGRESS NOTES
SOAP note objective information for 2/7/2020.  Please refer to the daily flowsheet for treatment today, total treatment time and time spent performing 1:1 timed codes.        Diagnosis:  Left  thumb Metacarpal Fracture, base of thumb  DOI:  12/2519    Referring MD: Sergey Mcintyre MD    Next MD visit: 2/28/20      Initial Subjective:  Maria Luisa Garcia is a 69 year old  left  hand dominant female.  Patient reports symptoms of pain, stiffness/loss of motion, weakness/loss of strength and edema of the left thumb which occurred due to a fall at home.     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  Job Tasks: Computer Work, Lifting, Carrying, Prolonged Sitting, Prolonged Standing, Pushing, Pulling, Repetitive Tasks  Prior functional level:  no limitations  Barriers include:none, lives alone  Mobility: No difficulty  Transportation: drives  Leisure activities/hobbies: babysitting and traveling, skiing, boating      Functional Outcome Measure:  See flowsheet      Objective:  Observation: Color/Temperature:     [x]    Normal  dry, scaley skin  Pain Level (Scale 0-10):   1/31/2020 2/7   At Rest 0 0   With Use 6 3     Pain Description:  Date 1/31/2020 2/7    left    Location  thumb Now in radial wrist and dorsal wrist   Pain Quality Aching, Sharp, Stabbing and Tender    Frequency daily      Pain is worst  daytime    Exacerbated by  moving too much    Relieved by rest    Progression Gradually getting better.         ROM:    Wrist 1/31/2020 2/7   AROM(PROM) Left    Extension 45 65   Flexion 45 47   RD 20    UD 35    Supination WNL    Pronation WNL      ROM:  Thumb 1/31/2020 2/7   AROM(PROM) Left    MP HE/35 /35   IP 0/20 /37   RAbd 55 52   PAbd 60 53   Retropulsion     Kapandji Opposition Scale (0-10/10) 4 6       Strength:  [x]   Contraindicated  Edema:  mild of the  hand and  thumb  Palpation:  []     Normal        [x]   Tender       []  Mild         [x]   Moderate []   Severe   Location: base of L thumb      Sensation:  WNL throughout all nerve distributions; per patient report      Assessment:   Please refer to the daily flowsheet for treatment today, total treatment time and time spent performing 1:1 timed codes.        Treatment Plan:   Frequency:  1 X week, once daily  Duration:  for 6 weeks     Modalities:  Fluidotherapy and Paraffin  Therapeutic Exercise:  AROM  Neuromuscular re-education:  Kinesthetic Training, Proprioceptive Training and Stabilization  Manual Techniques:  Myofascial release  Self Care:  Ergonomic Considerations    Orthotic Fabrication: Static hand based thumb spica orthosis, IP free    Discharge Plan:  Achieve all LTG  [place] in home treatment program.  Reach maximal therapeutic benefit.  Please refer to the daily flowsheet for treatment today and total treatment time.      Home Exercise Program:  Thumb and wrist AROM all planes  Wear HBTSS full time, removed for hygiene, exercises  2/7/2020  Added wrist AROM all planes  Hook fist due to pain at radial and dorsal wrist    Next Visit:  Progress ROM

## 2020-02-14 ENCOUNTER — THERAPY VISIT (OUTPATIENT)
Dept: OCCUPATIONAL THERAPY | Facility: CLINIC | Age: 70
End: 2020-02-14
Payer: COMMERCIAL

## 2020-02-14 DIAGNOSIS — M25.642 STIFFNESS OF FINGER JOINT OF LEFT HAND: Primary | ICD-10-CM

## 2020-02-14 DIAGNOSIS — M79.645 THUMB PAIN, LEFT: ICD-10-CM

## 2020-02-14 PROCEDURE — 97112 NEUROMUSCULAR REEDUCATION: CPT | Mod: GO | Performed by: OCCUPATIONAL THERAPIST

## 2020-02-14 PROCEDURE — 97140 MANUAL THERAPY 1/> REGIONS: CPT | Mod: GO | Performed by: OCCUPATIONAL THERAPIST

## 2020-02-14 PROCEDURE — 97110 THERAPEUTIC EXERCISES: CPT | Mod: GO | Performed by: OCCUPATIONAL THERAPIST

## 2020-02-14 NOTE — PROGRESS NOTES
SOAP note objective information for 2/14/2020.  Please refer to the daily flowsheet for treatment today, total treatment time and time spent performing 1:1 timed codes.        Diagnosis:  Left  thumb Metacarpal Fracture, base of thumb  DOI:  12/25/19    Referring MD: Sergey Mcintyre MD    Next MD visit: 2/28/20      Initial Subjective:  Maria Luisa Garcia is a 69 year old  left  hand dominant female.  Patient reports symptoms of pain, stiffness/loss of motion, weakness/loss of strength and edema of the left thumb which occurred due to a fall at home.     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  Job Tasks: Computer Work, Lifting, Carrying, Prolonged Sitting, Prolonged Standing, Pushing, Pulling, Repetitive Tasks  Prior functional level:  no limitations  Barriers include:none, lives alone  Mobility: No difficulty  Transportation: drives  Leisure activities/hobbies: babysitting and traveling, skiing, boating      Functional Outcome Measure:  See flowsheet      Objective:  Observation: Color/Temperature:     [x]    Normal  dry, scaley skin  Pain Level (Scale 0-10):   1/31/2020 2/7   At Rest 0 0   With Use 6 3     Pain Description:  Date 1/31/2020 2/7    left    Location  thumb Now in radial wrist and dorsal wrist   Pain Quality Aching, Sharp, Stabbing and Tender    Frequency daily      Pain is worst  daytime    Exacerbated by  moving too much    Relieved by rest    Progression Gradually getting better.         ROM:    Wrist 1/31/2020 2/7 2/14   AROM(PROM) Left     Extension 45 65    Flexion 45 47    RD 20  16   UD 35  32   Supination WNL     Pronation WNL       ROM:  Thumb 1/31/2020 2/7 2/14   AROM(PROM) Left     MP HE/35 /35 /30   IP 0/20 /37 40    RAbd 55 52 60   PAbd 60 53 63   Retropulsion      Kapandji Opposition Scale (0-10/10) 4 6        Strength:  [x]    Contraindicated  Edema:  mild of the  hand and thumb  Palpation:  []     Normal        [x]   Tender       []  Mild         [x]   Moderate []   Severe   Location: base of L thumb      Sensation:  WNL throughout all nerve distributions; per patient report      Assessment:   Please refer to the daily flowsheet for treatment today, total treatment time and time spent performing 1:1 timed codes.        Treatment Plan:   Frequency:  1 X week, once daily  Duration:  for 6 weeks     Modalities:  Fluidotherapy and Paraffin  Therapeutic Exercise:  AROM  Neuromuscular re-education:  Kinesthetic Training, Proprioceptive Training and Stabilization  Manual Techniques:  Myofascial release  Self Care:  Ergonomic Considerations    Orthotic Fabrication: Static hand based thumb spica orthosis, IP free    Discharge Plan:  Achieve all LTG  [place] in home treatment program.  Reach maximal therapeutic benefit.  Please refer to the daily flowsheet for treatment today and total treatment time.      Home Exercise Program:  Thumb and wrist AROM all planes  Wear HBTSS full time, removed for hygiene, exercises  2/7/2020  Added wrist AROM all planes  Hook fist due to pain at radial and dorsal wrist  2/14/2020  ktaping for wrist 3rd and 1st discontinue for edeam  Wean out of splint    Next Visit:  Progress ROM

## 2020-02-21 ENCOUNTER — THERAPY VISIT (OUTPATIENT)
Dept: OCCUPATIONAL THERAPY | Facility: CLINIC | Age: 70
End: 2020-02-21
Payer: COMMERCIAL

## 2020-02-21 DIAGNOSIS — M79.645 THUMB PAIN, LEFT: ICD-10-CM

## 2020-02-21 DIAGNOSIS — M25.642 STIFFNESS OF FINGER JOINT OF LEFT HAND: Primary | ICD-10-CM

## 2020-02-21 PROCEDURE — 97035 APP MDLTY 1+ULTRASOUND EA 15: CPT | Mod: GO | Performed by: OCCUPATIONAL THERAPIST

## 2020-02-21 PROCEDURE — 97112 NEUROMUSCULAR REEDUCATION: CPT | Mod: GO | Performed by: OCCUPATIONAL THERAPIST

## 2020-02-21 PROCEDURE — 97140 MANUAL THERAPY 1/> REGIONS: CPT | Mod: GO | Performed by: OCCUPATIONAL THERAPIST

## 2020-02-21 PROCEDURE — 97110 THERAPEUTIC EXERCISES: CPT | Mod: GO | Performed by: OCCUPATIONAL THERAPIST

## 2020-02-21 NOTE — PROGRESS NOTES
SOAP note objective information for 2/21/2020.  Please refer to the daily flowsheet for treatment today, total treatment time and time spent performing 1:1 timed codes.        Diagnosis:  Left  thumb Metacarpal Fracture, base of thumb  DOI:  12/25/19    Referring MD: Sergey Mcintyre MD    Next MD visit: 2/28/20      Initial Subjective:  Maria Luisa Garcia is a 69 year old  left  hand dominant female.  Patient reports symptoms of pain, stiffness/loss of motion, weakness/loss of strength and edema of the left thumb which occurred due to a fall at home.     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  Job Tasks: Computer Work, Lifting, Carrying, Prolonged Sitting, Prolonged Standing, Pushing, Pulling, Repetitive Tasks  Prior functional level:  no limitations  Barriers include:none, lives alone  Mobility: No difficulty  Transportation: drives  Leisure activities/hobbies: babysitting and traveling, skiing, boating      Functional Outcome Measure:  See flowsheet      Objective:  Observation: Color/Temperature:     [x]    Normal  dry, scaley skin  Pain Level (Scale 0-10):   1/31/2020 2/7   At Rest 0 0   With Use 6 3     Pain Description:  Date 1/31/2020 2/7    left    Location  thumb Now in radial wrist and dorsal wrist   Pain Quality Aching, Sharp, Stabbing and Tender    Frequency daily      Pain is worst  daytime    Exacerbated by  moving too much    Relieved by rest    Progression Gradually getting better.         ROM:    Wrist 1/31/2020 2/7 2/14 2/21   AROM(PROM) Left      Extension 45 65     Flexion 45 47     RD 20  16 25   UD 35  32 35   Supination WNL      Pronation WNL        ROM:  Thumb 1/31/2020 2/7 2/14 2/21    AROM(PROM) Left       MP HE/35 /35 /30 /40    IP 0/20 /37 40  /55    RAbd 55 52 60 60    PAbd 60 53 63     Retropulsion        Kapandji Opposition Scale (0-10/10) 4 6   10        Strength:  [x]   Contraindicated  Edema:  mild of the  hand and thumb  Palpation:  []     Normal        [x]   Tender       []  Mild         [x]   Moderate []   Severe   Location: base of L thumb      Sensation:  WNL throughout all nerve distributions; per patient report      Assessment:   Please refer to the daily flowsheet for treatment today, total treatment time and time spent performing 1:1 timed codes.        Treatment Plan:   Frequency:  1 X week, once daily  Duration:  for 6 weeks     Modalities:  Fluidotherapy and Paraffin  Therapeutic Exercise:  AROM  Neuromuscular re-education:  Kinesthetic Training, Proprioceptive Training and Stabilization  Manual Techniques:  Myofascial release  Self Care:  Ergonomic Considerations    Orthotic Fabrication: Static hand based thumb spica orthosis, IP free    Discharge Plan:  Achieve all LTG  [place] in home treatment program.  Reach maximal therapeutic benefit.  Please refer to the daily flowsheet for treatment today and total treatment time.      Home Exercise Program:  Thumb and wrist AROM all planes  Wear HBTSS full time, removed for hygiene, exercises  2/7/2020  Added wrist AROM all planes  Hook fist due to pain at radial and dorsal wrist  2/14/2020  ktaping for wrist 3rd and 1st dorsal compartmentfor edema  Wean out of splint    Next Visit:  Progress ROM and strengthening

## 2020-02-26 DIAGNOSIS — S62.232D OTHER CLOSED DISPLACED FRACTURE OF BASE OF FIRST METACARPAL BONE OF LEFT HAND WITH ROUTINE HEALING, SUBSEQUENT ENCOUNTER: Primary | ICD-10-CM

## 2020-02-28 ENCOUNTER — OFFICE VISIT (OUTPATIENT)
Dept: ORTHOPEDICS | Facility: CLINIC | Age: 70
End: 2020-02-28
Payer: COMMERCIAL

## 2020-02-28 ENCOUNTER — ANCILLARY PROCEDURE (OUTPATIENT)
Dept: GENERAL RADIOLOGY | Facility: CLINIC | Age: 70
End: 2020-02-28
Attending: ORTHOPAEDIC SURGERY
Payer: COMMERCIAL

## 2020-02-28 ENCOUNTER — THERAPY VISIT (OUTPATIENT)
Dept: OCCUPATIONAL THERAPY | Facility: CLINIC | Age: 70
End: 2020-02-28
Payer: COMMERCIAL

## 2020-02-28 DIAGNOSIS — S62.232D OTHER CLOSED DISPLACED FRACTURE OF BASE OF FIRST METACARPAL BONE OF LEFT HAND WITH ROUTINE HEALING, SUBSEQUENT ENCOUNTER: Primary | ICD-10-CM

## 2020-02-28 DIAGNOSIS — M25.642 STIFFNESS OF FINGER JOINT OF LEFT HAND: ICD-10-CM

## 2020-02-28 DIAGNOSIS — S62.232D OTHER CLOSED DISPLACED FRACTURE OF BASE OF FIRST METACARPAL BONE OF LEFT HAND WITH ROUTINE HEALING, SUBSEQUENT ENCOUNTER: ICD-10-CM

## 2020-02-28 DIAGNOSIS — M79.645 THUMB PAIN, LEFT: Primary | ICD-10-CM

## 2020-02-28 PROCEDURE — 97140 MANUAL THERAPY 1/> REGIONS: CPT | Mod: GO | Performed by: OCCUPATIONAL THERAPIST

## 2020-02-28 PROCEDURE — 97035 APP MDLTY 1+ULTRASOUND EA 15: CPT | Mod: GO | Performed by: OCCUPATIONAL THERAPIST

## 2020-02-28 PROCEDURE — 97110 THERAPEUTIC EXERCISES: CPT | Mod: GO | Performed by: OCCUPATIONAL THERAPIST

## 2020-02-28 NOTE — LETTER
Date:March 2, 2020      Patient was self referred, no letter generated. Do not send.        [facility] Health Information

## 2020-02-28 NOTE — PROGRESS NOTES
Follow-up left thumb metacarpal fracture.  Overall subjectively she is feeling better.  Less pain in the thumb.  Not wearing the splint much anymore.  Therapy is helping.  Denies any numbness or tingling.    The past medical history was reviewed and documented in the chart.  This includes medications, surgeries, social history as well as review of systems.    Physical examination of the left hand demonstrates very little pain over the fracture.  There is some pain over the thumb CMC joint to palpation with some crepitance.  No gross instability.  Mild tenderness also over the first dorsal compartment and Finkelstein's is mildly positive.  Negative Tinel's, negative Phalen's.  No motor, no sensory deficits are noted.  No significant atrophy.  There is brisk capillary refill in all digits and a palpable radial pulse.  No overlying skin changes noted.    X-rays were reviewed today, AP, lateral, Bonilla view of the left thumb.  The thumb metacarpal base fracture is healing nicely.  There is callus formation and overall alignment is certainly acceptable.  She has thumb CMC arthritic changes.    Impression:  #1 status post left thumb metacarpal fracture, healing  #2 left thumb CMC joint osteoarthritis  #3 mild left wrist de Quervain's stenosing tenosynovitis    Plan:  She continues to improve.  At this point I would not recommend any cortisone injections, certainly no surgical indications.  She can finish out her course of therapy, work on her home exercise program.  She can use the splint as needed.  If her thumb flares up we could consider a thumb spica splint, hand-based, for her thumb CMC arthritis or either a cortisone injection for the de Quervain's or thumb CMC arthritis as well.  She has our contact information.  I will plan on seeing her back on an as-needed basis.

## 2020-02-28 NOTE — LETTER
2/28/2020       RE: Maria Luisa Garcia  [address] [place] [address]     Dear Colleague,    Thank you for referring your patient, Maria Luisa Garcia, to the [facility] at [facility]. Please see a copy of my visit note below.    Follow-up left thumb metacarpal fracture.  Overall subjectively she is feeling better.  Less pain in the thumb.  Not wearing the splint much anymore.  Therapy is helping.  Denies any numbness or tingling.    The past medical history was reviewed and documented in the chart.  This includes medications, surgeries, social history as well as review of systems.    Physical examination of the left hand demonstrates very little pain over the fracture.  There is some pain over the thumb CMC joint to palpation with some crepitance.  No gross instability.  Mild tenderness also over the first dorsal compartment and Finkelstein's is mildly positive.  Negative Tinel's, negative Phalen's.  No motor, no sensory deficits are noted.  No significant atrophy.  There is brisk capillary refill in all digits and a palpable radial pulse.  No overlying skin changes noted.    X-rays were reviewed today, AP, lateral, Bonilla view of the left thumb.  The thumb metacarpal base fracture is healing nicely.  There is callus formation and overall alignment is certainly acceptable.  She has thumb CMC arthritic changes.    Impression:  #1 status post left thumb metacarpal fracture, healing  #2 left thumb CMC joint osteoarthritis  #3 mild left wrist de Quervain's stenosing tenosynovitis    Plan:  She continues to improve.  At this point I would not recommend any cortisone injections, certainly no surgical indications.  She can finish out her course of therapy, work on her home exercise program.  She can use the splint as needed.  If her thumb flares up we could consider a thumb spica splint, hand-based, for her thumb CMC arthritis or either a cortisone injection for the de  Quervain's or thumb CMC arthritis as well.  She has our contact information.  I will plan on seeing her back on an as-needed basis.    Again, thank you for allowing me to participate in the care of your patient.      Sincerely,    Sergey Mcintyre MD

## 2020-02-28 NOTE — PROGRESS NOTES
SOAP note objective information for 2/28/2020.  Please refer to the daily flowsheet for treatment today, total treatment time and time spent performing 1:1 timed codes.          Diagnosis:  Left  thumb Metacarpal Fracture, base of thumb  DOI:  12/25/19    Referring MD: Sergey Mcintyre MD    Next MD visit: 2/28/20      Initial Subjective:  Maria Luisa Garcia is a 69 year old  left  hand dominant female.  Patient reports symptoms of pain, stiffness/loss of motion, weakness/loss of strength and edema of the left thumb which occurred due to a fall at home.     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  Job Tasks: Computer Work, Lifting, Carrying, Prolonged Sitting, Prolonged Standing, Pushing, Pulling, Repetitive Tasks  Prior functional level:  no limitations  Barriers include:none, lives alone  Mobility: No difficulty  Transportation: drives  Leisure activities/hobbies: babysitting and traveling, skiing, boating      Functional Outcome Measure:  See flowsheet      Objective:  Observation: Color/Temperature:     [x]    Normal  dry, scaley skin  Pain Level (Scale 0-10):   1/31/2020 2/7    At Rest 0 0    With Use 6 3      Pain Description:  Date 1/31/2020 2/7    left    Location  thumb Now in radial wrist and dorsal wrist   Pain Quality Aching, Sharp, Stabbing and Tender    Frequency daily      Pain is worst  daytime    Exacerbated by  moving too much    Relieved by rest    Progression Gradually getting better.         ROM:    Wrist 1/31/2020 2/7 2/14 2/21    AROM(PROM) Left       Extension 45 65   65   Flexion 45 47   45   RD 20  16 25 25   UD 35  32 35 30+   Supination WNL       Pronation WNL         ROM:  Thumb 1/31/2020 2/7 2/14 2/21 2/28   AROM(PROM) Left       MP HE/35 /35 /30 /40 /50   IP 0/20 /37 40  /55 /69   RAbd 55 52 60 60 59   PAbd 60 53 63  55   Retropulsion         Kapandji Opposition Scale (0-10/10) 4 6  10 9     Strength   (Measured in pounds)  Pain Report:  + mild    ++ moderate    +++ severe    2/28/2020 2/28/2020   Trials Right Left   1  2  3 55 35   Average 55 35     Lat Pinch 2/28/2020 2/28/2020   Trials Right Left   1  2  3 11 11   Average 11 11         Edema:  mild of the  hand and thumb  Palpation:  []     Normal        [x]   Tender       [x]  Mild         []   Moderate []   Severe   Location: base of L thumb and thumb MC shaft     Sensation:  WNL throughout all nerve distributions; per patient report      Assessment:   Please refer to the daily flowsheet for treatment today, total treatment time and time spent performing 1:1 timed codes.        Treatment Plan:   Frequency:  1 X week, once daily  Duration:  for 6 weeks     Modalities:  Fluidotherapy and Paraffin  Therapeutic Exercise:  AROM  Neuromuscular re-education:  Kinesthetic Training, Proprioceptive Training and Stabilization  Manual Techniques:  Myofascial release  Self Care:  Ergonomic Considerations    Orthotic Fabrication: Static hand based thumb spica orthosis, IP free    Discharge Plan:  Achieve all LTG  [place] in home treatment program.  Reach maximal therapeutic benefit.  Please refer to the daily flowsheet for treatment today and total treatment time.      Home Exercise Program:  Thumb and wrist AROM all planes  Wear HBTSS full time, removed for hygiene, exercises  2/7/2020  Added wrist AROM all planes  Hook fist due to pain at radial and dorsal wrist  2/14/2020  ktaping for wrist 3rd and 1st dorsal compartment for edema  Wean out of splint  2/28/2020  Start gripping in all 3 FA planes, let thumb be around the ball and not pinching        Next Visit:  Progress ROM and strengthening

## 2020-02-28 NOTE — NURSING NOTE
Reason For Visit:   Chief Complaint   Patient presents with     Follow Up     Left thumb metacarpal fracture.        Pain Assessment  Patient Currently in Pain: Other (Comment)(Patient stated that she has occasional twinges in the left thumb)        Allergies   Allergen Reactions     Cleocin Anaphylaxis     Codeine Nausea and Vomiting           Luz Martin LPN

## 2020-03-15 ENCOUNTER — HEALTH MAINTENANCE LETTER (OUTPATIENT)
Age: 70
End: 2020-03-15

## 2020-03-19 ENCOUNTER — TELEPHONE (OUTPATIENT)
Dept: OCCUPATIONAL THERAPY | Facility: CLINIC | Age: 70
End: 2020-03-19

## 2020-03-19 DIAGNOSIS — M25.642 STIFFNESS OF FINGER JOINT OF LEFT HAND: ICD-10-CM

## 2020-03-19 DIAGNOSIS — M79.645 THUMB PAIN, LEFT: ICD-10-CM

## 2020-03-19 NOTE — TELEPHONE ENCOUNTER
The patient has been notified of following:     Due to external, as well as internal [facility] management of COVID-19 Virus, Maria Luisa Garcia was not seen in our clinic.  As a substitution, we implemented a telephone visit to manage this patient's condition.       S: Patient called to discuss their condition/progress. They noted improvements in some pain.  They noted ongoing pain or limitations with discomfort and stiffness in the wrist, and some swelling.          O: Patient verbally assented to her ability to continue her exercises and would like to have a face to face visit in about 4 weeks. The appointment was made with the pt on the phone call.       A:   Pt is doing her exercises and home program and will continue until next visit     P: Patient will continue with the exercise program assigned on their PTRx code and will add the following measures to manage their pain/condition:         Telephone visit contact time  Visit Started at 12:15  Visit Ended at 12:20  Total Time for documentation: 5     Procedure Code (For internal tracking only, please document any charges you would apply)  Therapeutic Activities: 5 minutes         I have reviewed the note as documented above.  This accurately captures the substance of my conversation with the patient.    Shanda CAMPOS, OTR/L, CHT

## 2020-03-23 PROBLEM — M79.645 THUMB PAIN, LEFT: Status: RESOLVED | Noted: 2020-01-31 | Resolved: 2020-03-23

## 2020-03-23 PROBLEM — M25.642 STIFFNESS OF FINGER JOINT OF LEFT HAND: Status: RESOLVED | Noted: 2020-01-31 | Resolved: 2020-03-23

## 2020-05-22 ENCOUNTER — OFFICE VISIT (OUTPATIENT)
Dept: LAB | Facility: CLINIC | Age: 70
End: 2020-05-22
Payer: COMMERCIAL

## 2020-05-22 PROCEDURE — 36415 COLL VENOUS BLD VENIPUNCTURE: CPT | Performed by: INTERNAL MEDICINE

## 2020-05-22 PROCEDURE — 86769 SARS-COV-2 COVID-19 ANTIBODY: CPT | Mod: 90 | Performed by: INTERNAL MEDICINE

## 2020-05-22 PROCEDURE — 99000 SPECIMEN HANDLING OFFICE-LAB: CPT | Performed by: INTERNAL MEDICINE

## 2020-05-23 LAB
COVID-19 SPIKE RBD ABY TITER: NORMAL
COVID-19 SPIKE RBD ABY: NEGATIVE

## 2020-10-09 DIAGNOSIS — M25.561 BILATERAL KNEE PAIN: Primary | ICD-10-CM

## 2020-10-09 DIAGNOSIS — M25.562 BILATERAL KNEE PAIN: Primary | ICD-10-CM

## 2020-10-09 NOTE — TELEPHONE ENCOUNTER
RECORDS RECEIVED FROM: Bilateral knee OA   DATE RECEIVED: Oct 12, 2020     NOTES STATUS DETAILS   OFFICE NOTE from referring provider Internal    OFFICE NOTE from other specialist N/A    DISCHARGE SUMMARY from hospital N/A    DISCHARGE REPORT from the ER N/A    OPERATIVE REPORT N/A    MEDICATION LIST Internal    IMPLANT RECORD/STICKER N/A    LABS     CBC/DIFF N/A    CULTURES N/A    INJECTIONS DONE IN RADIOLOGY N/A    MRI N/A    CT SCAN N/A    XRAYS (IMAGES & REPORTS) N/A    TUMOR     PATHOLOGY  Slides & report N/An    10/09/20   3:15 PM   No images  Anne Burk, CMA

## 2020-10-12 ENCOUNTER — ANCILLARY PROCEDURE (OUTPATIENT)
Dept: GENERAL RADIOLOGY | Facility: CLINIC | Age: 70
End: 2020-10-12
Attending: ORTHOPAEDIC SURGERY
Payer: COMMERCIAL

## 2020-10-12 ENCOUNTER — OFFICE VISIT (OUTPATIENT)
Dept: ORTHOPEDICS | Facility: CLINIC | Age: 70
End: 2020-10-12
Payer: COMMERCIAL

## 2020-10-12 ENCOUNTER — PRE VISIT (OUTPATIENT)
Dept: ORTHOPEDICS | Facility: CLINIC | Age: 70
End: 2020-10-12

## 2020-10-12 DIAGNOSIS — M25.561 BILATERAL KNEE PAIN: ICD-10-CM

## 2020-10-12 DIAGNOSIS — M25.562 BILATERAL KNEE PAIN: ICD-10-CM

## 2020-10-12 DIAGNOSIS — M17.0 TRICOMPARTMENT OSTEOARTHRITIS OF BOTH KNEES: Primary | ICD-10-CM

## 2020-10-12 PROCEDURE — 73562 X-RAY EXAM OF KNEE 3: CPT | Mod: GC | Performed by: RADIOLOGY

## 2020-10-12 PROCEDURE — 99207 PR SATISFY VISIT NUMBER: CPT | Performed by: ORTHOPAEDIC SURGERY

## 2020-10-12 PROCEDURE — 77073 BONE LENGTH STUDIES: CPT | Performed by: RADIOLOGY

## 2020-10-12 PROCEDURE — 20610 DRAIN/INJ JOINT/BURSA W/O US: CPT | Mod: 50 | Performed by: NURSE PRACTITIONER

## 2020-10-12 RX ORDER — LIDOCAINE HYDROCHLORIDE 10 MG/ML
2 INJECTION, SOLUTION EPIDURAL; INFILTRATION; INTRACAUDAL; PERINEURAL
Status: DISCONTINUED | OUTPATIENT
Start: 2020-10-12 | End: 2021-01-08

## 2020-10-12 RX ORDER — TRIAMCINOLONE ACETONIDE 40 MG/ML
40 INJECTION, SUSPENSION INTRA-ARTICULAR; INTRAMUSCULAR
Status: DISCONTINUED | OUTPATIENT
Start: 2020-10-12 | End: 2021-01-08

## 2020-10-12 RX ADMIN — LIDOCAINE HYDROCHLORIDE 2 ML: 10 INJECTION, SOLUTION EPIDURAL; INFILTRATION; INTRACAUDAL; PERINEURAL at 08:36

## 2020-10-12 RX ADMIN — TRIAMCINOLONE ACETONIDE 40 MG: 40 INJECTION, SUSPENSION INTRA-ARTICULAR; INTRAMUSCULAR at 08:36

## 2020-10-12 NOTE — LETTER
"    10/12/2020         RE: Maria Luisa Garcia  [address] [place] [address]        Dear Colleague,    Thank you for referring your patient, Maria Luisa Garcia, to the [facility] [place]. Please see a copy of my visit note below.    CC: bilateral knee pain R>L    HPI:    Maria Luisa is a previous patient who is seen today for bilateral knee pain and \"limping\". She states that her knee pain is worsened to the point now where she \"can't ignore it anymore\". Her left knee history involves an ACL repair with scope at the age of 35 by Dr. Mcgee. Dr. Rodriguez performed urgent arthroscopy x2 in 2004 with good results. She had Dr. Goldman perform an arthroscope in 2010 for a medial meniscus tear. She denies instability to her left knee it just \"aches and she walks with a limp\". She has pain with doing stairs and in sit to stand. She has activity related swelling. 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 Since then, she has had increased swelling and a \"deep ache\". She denies previous injuries, falls, trauma, or surgery in the past.     PMH: Reviewed in patient chart today 10/12/2020 by myself.    ROS: Reviewed from patient chart today 10/12/2020 with all systems negative except for those listed below.    PE:  Pleasant and cooperative female alert and oriented x3. She arises from chair using side rails. She has a varus thrust gait on the left when compared to the right. She does have a limp with gait.     Her left knee has previous incisions that are well healed. There is no significant palpable effusion. There is obvious varus. She is tender to palpate the medial and lateral joint with crepitus with patella mobilization. ROM:-8-112. Quad strength is symmetrical to contralateral side. + CMS. Negative lachmans exam without laxity in valgus/varus stress.    Her " right knee has near normal motion with pain at the end of flexion. Tender to palpate the medial and lateral joint spaces with crepitus noted to patella mobilization. No palpable effusion. Negative lachmans exam without laxity in valgus/varus stress. + CMS. Quad strength symmetrical to contralateral side.    Xrays were taken today for Dr. Rodriguez to review who agrees with radiologist read as below:    Impression:  1.  Severe osteoarthrosis of the medial compartment of the left knee  with joint space loss.  2.  Severe osteoarthrosis of the lateral compartment of the right knee  with joint space loss.  3.  No acute osseous abnormality.  4.  Stable postoperative changes of left ACL repair.    Full length demonstrates near neutral leg lengths with varus alignment of the left of -9 degrees with neutral on the right. There is also mild bilateral hip arthritis left greater than right.    Dx:  1. Bilateral knee osteaorthritis, tricompartmental, advanced grade IV    Plan:  1. The natural progression and plan of care was discussed in managing arthritis pain. Injections, NSAIDS oral and topical, bracing, activity modification, and therapy were discussed. She was offered cortisone injections today to both knees which she did get. She will think of surgery in the future which would be staged bilateral total knee replacements. Risks, benefits, prognosis and possible complications were discussed including stiffness, infection, blood clots, continued pain and nerve palsy.     Total time spent was 40 minute with greater than 50% spent in face to face consultation and collaboration of care.      Procedure note     Preprocedure diagnosis: Bilateral knee osteoarthritis     Post procedure diagnosis: Bilateral knee osteoarthritis     Procedure:     Informed consent was obtained.  Using sterile technique, the anterior lateral aspect of the both knees were prepped with chlorhexidine x2.  1 cc of 40 mg of Kenalog and 2 cc of lidocaine were  injected in the lateral joint space of both knees without difficulty.  Patient tolerated procedure well and will follow-up      Large Joint Injection/Arthocentesis: bilateral knee    Date/Time: 10/12/2020 8:36 AM  Performed by: Angeline Whaley APRN CNP  Authorized by: Walter Rodriguez MD     Indications:  Pain  Needle Size:  25 G  Guidance: landmark guided    Location:  Knee  Laterality:  Bilateral      Medications (Right):  40 mg triamcinolone 40 MG/ML; 2 mL lidocaine (PF) 1 %  Medications (Left):  40 mg triamcinolone 40 MG/ML; 2 mL lidocaine (PF) 1 %  Outcome:  Tolerated well, no immediate complications  Procedure discussed: discussed risks, benefits, and alternatives    Consent Given by:  Patient  Timeout: timeout called immediately prior to procedure    Prep: patient was prepped and draped in usual sterile fashion        [facility] [address] [address]  [phone]  Dept: [phone]  ______________________________________________________________________________    Patient: Maria Luias Garcia   : 1950   MRN: [id number]   2020    INVASIVE PROCEDURE SAFETY CHECKLIST    Date: 10/12/2020   Procedure: bilateral knee cortisone injection  Patient Name: Maria Luisa Garcia  MRN: [id number]  YOB: 1950    Action: Complete sections as appropriate. Any discrepancy results in a HARD COPY until resolved.     PRE PROCEDURE:  Patient ID verified with 2 identifiers (name and  or MRN): Yes  Procedure and site verified with patient/designee (when able): Yes  Accurate consent documentation in medical record: Yes  H&P (or appropriate assessment) documented in medical record: NA  H&P must be up to 20 days prior to procedure and updates within 24 hours of procedure as applicable: NA  Relevant diagnostic and radiology test results appropriately labeled and displayed as applicable: Yes  Procedure site(s) marked with provider  initials: Yes    TIMEOUT:  Time-Out performed immediately prior to starting procedure, including verbal and active participation of all team members addressing the following:Yes  * Correct patient identify  * Confirmed that the correct side and site are marked  * An accurate procedure consent form  * Agreement on the procedure to be done  * Correct patient position  * Relevant images and results are properly labeled and appropriately displayed  * The need to administer antibiotics or fluids for irrigation purposes during the procedure as applicable   * Safety precautions based on patient history or medication use    DURING PROCEDURE: Verification of correct person, site, and procedures any time the responsibility for care of the patient is transferred to another member of the care team.       The following medications were given:         Prior to injection, verified patient identity using patient's name and date of birth.  Due to injection administration, patient instructed to remain in clinic for 15 minutes  afterwards, and to report any adverse reaction to me immediately.    Joint injection was performed.    Medication Name: Lidocaine NDC 77422-944  Drug Amount Wasted:  Yes: 1 mg/ml   Vial/Syringe: Single dose vial  Expiration Date:  2/24    Medication Name Kenalog NDC [address]-1  Medication Name Kenalog NDC [address]-0    Scribed by Urvashi Gill ATC for Angeline Whaley on October 12, 2020 at 815 based on the provider's statements to me.     Urvashi Gill ATC    I, Dr. Walter Rodriguez, agree with above documentation and plan of care.          Again, thank you for allowing me to participate in the care of your patient.        Sincerely,        Walter Rodriguez MD

## 2020-10-12 NOTE — LETTER
Date:October 14, 2020      Patient was self referred, no letter generated. Do not send.        [facility] Information

## 2020-10-12 NOTE — NURSING NOTE
Reason For Visit:   Chief Complaint   Patient presents with     Consult     bilateral knee pain        There were no vitals taken for this visit.    Pain Assessment  Patient Currently in Pain: Denies(pain with flexion)    Urvashi Gill, ATC

## 2020-10-12 NOTE — PROGRESS NOTES
"CC: bilateral knee pain R>L    HPI:    Maria Luisa is a previous patient who is seen today for bilateral knee pain and \"limping\". She states that her knee pain is worsened to the point now where she \"can't ignore it anymore\". Her left knee history involves an ACL repair with scope at the age of 35 by Dr. Mcgee. Dr. Rodriguez performed urgent arthroscopy x2 in 2004 with good results. She had Dr. Goldman perform an arthroscope in 2010 for a medial meniscus tear. She denies instability to her left knee it just \"aches and she walks with a limp\". She has pain with doing stairs and in sit to stand. She has activity related swelling. 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 Since then, she has had increased swelling and a \"deep ache\". She denies previous injuries, falls, trauma, or surgery in the past.     PMH: Reviewed in patient chart today 10/12/2020 by myself.    ROS: Reviewed from patient chart today 10/12/2020 with all systems negative except for those listed below.    PE:  Pleasant and cooperative female alert and oriented x3. She arises from chair using side rails. She has a varus thrust gait on the left when compared to the right. She does have a limp with gait.     Her left knee has previous incisions that are well healed. There is no significant palpable effusion. There is obvious varus. She is tender to palpate the medial and lateral joint with crepitus with patella mobilization. ROM:-8-112. Quad strength is symmetrical to contralateral side. + CMS. Negative lachmans exam without laxity in valgus/varus stress.    Her right knee has near normal motion with pain at the end of flexion. Tender to palpate the medial and lateral joint spaces with crepitus noted to patella mobilization. No palpable effusion. Negative lachmans exam without laxity in valgus/varus stress. + CMS. Quad " strength symmetrical to contralateral side.    Xrays were taken today for Dr. Rodriguez to review who agrees with radiologist read as below:    Impression:  1.  Severe osteoarthrosis of the medial compartment of the left knee  with joint space loss.  2.  Severe osteoarthrosis of the lateral compartment of the right knee  with joint space loss.  3.  No acute osseous abnormality.  4.  Stable postoperative changes of left ACL repair.    Full length demonstrates near neutral leg lengths with varus alignment of the left of -9 degrees with neutral on the right. There is also mild bilateral hip arthritis left greater than right.    Dx:  1. Bilateral knee osteaorthritis, tricompartmental, advanced grade IV    Plan:  1. The natural progression and plan of care was discussed in managing arthritis pain. Injections, NSAIDS oral and topical, bracing, activity modification, and therapy were discussed. She was offered cortisone injections today to both knees which she did get. She will think of surgery in the future which would be staged bilateral total knee replacements. Risks, benefits, prognosis and possible complications were discussed including stiffness, infection, blood clots, continued pain and nerve palsy.     Total time spent was 40 minute with greater than 50% spent in face to face consultation and collaboration of care.      Procedure note     Preprocedure diagnosis: Bilateral knee osteoarthritis     Post procedure diagnosis: Bilateral knee osteoarthritis     Procedure:     Informed consent was obtained.  Using sterile technique, the anterior lateral aspect of the both knees were prepped with chlorhexidine x2.  1 cc of 40 mg of Kenalog and 2 cc of lidocaine were injected in the lateral joint space of both knees without difficulty.  Patient tolerated procedure well and will follow-up      Large Joint Injection/Arthocentesis: bilateral knee    Date/Time: 10/12/2020 8:36 AM  Performed by: Angeline Whaley APRN CNP  Authorized  by: Walter Rodriguez MD     Indications:  Pain  Needle Size:  25 G  Guidance: landmark guided    Location:  Knee  Laterality:  Bilateral      Medications (Right):  40 mg triamcinolone 40 MG/ML; 2 mL lidocaine (PF) 1 %  Medications (Left):  40 mg triamcinolone 40 MG/ML; 2 mL lidocaine (PF) 1 %  Outcome:  Tolerated well, no immediate complications  Procedure discussed: discussed risks, benefits, and alternatives    Consent Given by:  Patient  Timeout: timeout called immediately prior to procedure    Prep: patient was prepped and draped in usual sterile fashion        [facility] [address] [address] [phone]  Dept: [phone]  ______________________________________________________________________________    Patient: Maria Luisa Garcia   : 1950   MRN: [id number]   2020    INVASIVE PROCEDURE SAFETY CHECKLIST    Date: 10/12/2020   Procedure: bilateral knee cortisone injection  Patient Name: Maria Luisa Garcia  MRN: [id number]  YOB: 1950    Action: Complete sections as appropriate. Any discrepancy results in a HARD COPY until resolved.     PRE PROCEDURE:  Patient ID verified with 2 identifiers (name and  or MRN): Yes  Procedure and site verified with patient/designee (when able): Yes  Accurate consent documentation in medical record: Yes  H&P (or appropriate assessment) documented in medical record: NA  H&P must be up to 20 days prior to procedure and updates within 24 hours of procedure as applicable: NA  Relevant diagnostic and radiology test results appropriately labeled and displayed as applicable: Yes  Procedure site(s) marked with provider initials: Yes    TIMEOUT:  Time-Out performed immediately prior to starting procedure, including verbal and active participation of all team members addressing the following:Yes  * Correct patient identify  * Confirmed that the correct side and site are marked  * An  accurate procedure consent form  * Agreement on the procedure to be done  * Correct patient position  * Relevant images and results are properly labeled and appropriately displayed  * The need to administer antibiotics or fluids for irrigation purposes during the procedure as applicable   * Safety precautions based on patient history or medication use    DURING PROCEDURE: Verification of correct person, site, and procedures any time the responsibility for care of the patient is transferred to another member of the care team.       The following medications were given:         Prior to injection, verified patient identity using patient's name and date of birth.  Due to injection administration, patient instructed to remain in clinic for 15 minutes  afterwards, and to report any adverse reaction to me immediately.    Joint injection was performed.    Medication Name: Lidocaine NDC 30478-964  Drug Amount Wasted:  Yes: 1 mg/ml   Vial/Syringe: Single dose vial  Expiration Date:  2/24    Medication Name Kenalog NDC [address]-4  Medication Name Kenalog NDC [address]-6    Scribed by Urvashi Gill ATC for Angeline Whaley on October 12, 2020 at 815 based on the provider's statements to me.     Urvashi Gill ATC    I, Dr. Walter Rodriguez, agree with above documentation and plan of care.

## 2020-10-28 DIAGNOSIS — M17.12 PRIMARY LOCALIZED OSTEOARTHRITIS OF LEFT KNEE: Primary | ICD-10-CM

## 2020-10-28 DIAGNOSIS — N95.1 MENOPAUSAL SYNDROME: Primary | ICD-10-CM

## 2020-11-05 ENCOUNTER — ANCILLARY PROCEDURE (OUTPATIENT)
Dept: BONE DENSITY | Facility: CLINIC | Age: 70
End: 2020-11-05
Attending: NURSE PRACTITIONER
Payer: COMMERCIAL

## 2020-11-05 DIAGNOSIS — N95.1 MENOPAUSAL SYNDROME: ICD-10-CM

## 2020-11-05 PROCEDURE — 77080 DXA BONE DENSITY AXIAL: CPT

## 2020-11-05 PROCEDURE — 77080 DXA BONE DENSITY AXIAL: CPT | Mod: 26 | Performed by: RADIOLOGY

## 2020-11-11 ENCOUNTER — HOSPITAL ENCOUNTER (OUTPATIENT)
Dept: MRI IMAGING | Facility: CLINIC | Age: 70
Discharge: HOME | End: 2020-11-11
Attending: NURSE PRACTITIONER | Admitting: NURSE PRACTITIONER
Payer: COMMERCIAL

## 2020-11-11 DIAGNOSIS — M17.12 PRIMARY LOCALIZED OSTEOARTHRITIS OF LEFT KNEE: ICD-10-CM

## 2020-11-11 PROCEDURE — 73721 MRI JNT OF LWR EXTRE W/O DYE: CPT | Mod: RT

## 2020-11-11 PROCEDURE — 73721 MRI JNT OF LWR EXTRE W/O DYE: CPT | Mod: 26 | Performed by: RADIOLOGY

## 2020-11-19 DIAGNOSIS — D69.1 PLATELET DYSFUNCTION (H): ICD-10-CM

## 2020-11-19 DIAGNOSIS — M17.0 TRICOMPARTMENT OSTEOARTHRITIS OF BOTH KNEES: Primary | ICD-10-CM

## 2020-12-07 ENCOUNTER — TELEPHONE (OUTPATIENT)
Dept: ORTHOPEDICS | Facility: CLINIC | Age: 70
End: 2020-12-07

## 2020-12-07 ENCOUNTER — VIRTUAL VISIT (OUTPATIENT)
Dept: HEMATOLOGY | Facility: CLINIC | Age: 70
End: 2020-12-07
Attending: INTERNAL MEDICINE
Payer: COMMERCIAL

## 2020-12-07 ENCOUNTER — PREP FOR PROCEDURE (OUTPATIENT)
Dept: ORTHOPEDICS | Facility: CLINIC | Age: 70
End: 2020-12-07

## 2020-12-07 DIAGNOSIS — G89.29 CHRONIC PAIN OF BOTH KNEES: Primary | ICD-10-CM

## 2020-12-07 DIAGNOSIS — M17.0 TRICOMPARTMENT OSTEOARTHRITIS OF BOTH KNEES: ICD-10-CM

## 2020-12-07 DIAGNOSIS — M25.561 CHRONIC PAIN OF BOTH KNEES: Primary | ICD-10-CM

## 2020-12-07 DIAGNOSIS — M17.0 TRICOMPARTMENT OSTEOARTHRITIS OF BOTH KNEES: Primary | ICD-10-CM

## 2020-12-07 DIAGNOSIS — M25.562 CHRONIC PAIN OF BOTH KNEES: Primary | ICD-10-CM

## 2020-12-07 DIAGNOSIS — D69.1 PLATELET DYSFUNCTION (H): Primary | ICD-10-CM

## 2020-12-07 PROCEDURE — 99204 OFFICE O/P NEW MOD 45 MIN: CPT | Mod: 95 | Performed by: INTERNAL MEDICINE

## 2020-12-07 NOTE — PROGRESS NOTES
Patient was contacted to complete the pre-visit call prior to their video visit with the provider.  The following statement was read:       This visit will be billed to your insurance the same as an in-person visit. Because of Coronavirus we are instituting video visits when possible to keep everyone safe. This video visit will be conducted between you and the provider.  This service lets us provide the care you need with a video conversation.  If a prescription is necessary, we can send it directly to your pharmacy.If lab work or other testing is needed, we can help arrange a place/time for that to be done at a later date.If during the course of the call the provider feels a video visit is not appropriate, then your insurance company will not be billed.       Allergies and medications were reviewed and travel screening complete.     A test connection was Completed with Pt? 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 She will be undergoing al total knee replacement. Left on 1/8/21, R ~ 5 weeks later. . She has a h/o a platelet function defect that was diagnosed after she had bleeding with minimally invasive spine surgery in the ~1996s. She reports that her surgeon told her that when he tried cauterizing sites, she continued to ooze.  She also had arthroscopic knee  Surgery and had ongoing oozing from the small incision sites, apparently enough to drop her hemoglobin. She has been seen by Dr. STEPHANY Quinn at [facility] for the bleeding issue. She has had several surgical procedures in which she was given 1 unit of platelet preoperatively because of this defect, including open reduction internal fixation of R distal radial fracture 3/7/10, arthroscopic knee surgery 6/5/10  "and bilateral hammertoe correction and R navicular partial excision on 2/10/14. There were no bleeding complications with these procedures. She had a serious reaction (rspiratory distress, after the second time she received platelets. Since than she has received diphenhydramine and acetaminophen premeds, and gven leukoreduced platelets.      Labs at [facility] 96 (Scanned in to EPIC) show normal VWD antigen and ristocetin cofactor, normal FVIII, normal FXIII. Platelet aggs show decreased response to low dose ADP, arachadonic acid and epinephrine,  Suggestive of \"aspirin-like effect.\"  She had documented normal INR, PTT, fibrinogen and bleeding time in 3/4/1996.      She had  with her first pregnancy, no bleeding. With her second pregnancy, , she had an issue with anesthesia and had a cardiac arrest, with some bleeding. She does not have any significant bleeding without surgical stress- rare mild epistaxis,  No gum bleeding, melena, hematochezia, hematuria or easy bruising. She did not have menorrhagia when [facility] was still menstruating.      Main complaint today ilateral knee pain- mainly with significant walking, kneeling.      Past Medical History:  - platelet function defect   -s/p lumbar spine surgery   -s/p r and left arthroscopic knee surgery   -s/p bilateral hammertoe repair   -S/p bilateral carpal tunnel release sometime after      Medications:  -calcium  -vitamin D  Coenzyme Q  Flaxseed capsules  Glucosamine  Mag oxide - for muscle cramps     Never any aspirin, rare NSAID      Family History: mother -HTN , father -HHT, Parkinsons , siblings - brother-, sister- arthritis. No bleeding in any family members.      Social History: smoking- Never,  Alcohol-  Occasional, 2 glasses wine per week. Working full time patient care supervisor for pre-and post-op [place]      Review of systems:  As in HPI.  The rest of the > 10 point review of systems was " "negative.        Physical exam:     General appearance:  Patient is 70 year old woman in no acute distress.     HEENT:  No pallor, icterus.   Lungs: Normal respirations, no cough or audible wheezes.     Skin:  No rash, no petechiae or ecchymoses face, neck.     The rest of a comprehensive physical examination is deferred due to [facility] emergency video visit restrictions.     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 She does not have problems with bleeding with every day activiteies only with surgical procedures. Since she has done well with simple platelet transfusion with premeds immediately before surgery, there is no reason to \"rock the boat\" and do further work-up or make a different plan.      I did discuss with her that knee surgery s a bit different than the other procedures she has done, because the high risk of deep vein thrombosis (DVT) after surgery. Development of DVT is driven more by the blood clotting proteins than by platelets, therefore the platelet function defect is not enough to protect her from a DVT. Therefore, I recommend prophylactic anticoagulation after the surgery. On the other hand, I don't want her to bleed in the new knee joint, so it is reasonable to delay the anticoagulation a bit post op, rather than starting within the first 24 hours. The same plan should be used for each knee replacement.       Recommendations for surgery:  1. Transfuse 1 unit of platelets immediately pre-operatively  2. Give benadryl 50 mg IV and acetominophen 650 mg as premeds prior to platelet transfusion  3. If excessive bleeding during, or within 24 hours of procedure, give another 1 unit of platelets with premeds.   4. Start apixiban (Eliquis) 2.5 mg po bid (prophylactic dose) 48 hours after surgery and continue for 14 days.   5. Page Dr. Dotson [phone], or hematology consult service if Dr. Dotson not answering, " if there is bleeding or other concerns regarding bleeding/clotting management.      Video visit- [facility]  Start: 11:02 am  Stop: 11:30 am     Tram Dotson MD  Hematology

## 2020-12-07 NOTE — TELEPHONE ENCOUNTER
Maria Luisa is planning total knee replacements with Dr Rodriguez, right side 1/8/21 and left 2/11/20.  She phoned RN to review preparations and we reviewed preop H&P with PAC, she will call Lyn to schedule, we reviewed online total joint class, [employer], showers, NPO, COVID test.  Surgery packet was mailed to pt's home including the total joint replacement book.  Roseline Spencer RN

## 2020-12-08 NOTE — TELEPHONE ENCOUNTER
FUTURE VISIT INFORMATION      SURGERY INFORMATION:    Date: 21    Location: [facility] OR    Surgeon:  Walter Rodriguez MD    Anesthesia Type:  Spinal    Procedure: Right total knee arthroplasty    Consult: virtual visit 20    RECORDS REQUESTED FROM:       Most recent EKG+ Tracin/24/10

## 2020-12-10 DIAGNOSIS — Z11.59 ENCOUNTER FOR SCREENING FOR OTHER VIRAL DISEASES: Primary | ICD-10-CM

## 2020-12-18 ENCOUNTER — ANESTHESIA EVENT (OUTPATIENT)
Dept: SURGERY | Facility: CLINIC | Age: 70
End: 2020-12-18

## 2020-12-18 ENCOUNTER — OFFICE VISIT (OUTPATIENT)
Dept: SURGERY | Facility: CLINIC | Age: 70
End: 2020-12-18
Payer: COMMERCIAL

## 2020-12-18 ENCOUNTER — PRE VISIT (OUTPATIENT)
Dept: SURGERY | Facility: CLINIC | Age: 70
End: 2020-12-18

## 2020-12-18 VITALS
BODY MASS INDEX: 27.82 KG/M2 | WEIGHT: 157 LBS | HEIGHT: 63 IN | HEART RATE: 95 BPM | SYSTOLIC BLOOD PRESSURE: 172 MMHG | TEMPERATURE: 98 F | OXYGEN SATURATION: 98 % | RESPIRATION RATE: 12 BRPM | DIASTOLIC BLOOD PRESSURE: 96 MMHG

## 2020-12-18 DIAGNOSIS — M17.0 TRICOMPARTMENT OSTEOARTHRITIS OF BOTH KNEES: ICD-10-CM

## 2020-12-18 DIAGNOSIS — Z01.818 PREOP EXAMINATION: Primary | ICD-10-CM

## 2020-12-18 DIAGNOSIS — Z01.818 PREOP EXAMINATION: ICD-10-CM

## 2020-12-18 LAB
ANION GAP SERPL CALCULATED.3IONS-SCNC: 6 MMOL/L (ref 3–14)
BUN SERPL-MCNC: 17 MG/DL (ref 7–30)
CALCIUM SERPL-MCNC: 9.4 MG/DL (ref 8.5–10.1)
CHLORIDE SERPL-SCNC: 104 MMOL/L (ref 94–109)
CO2 SERPL-SCNC: 28 MMOL/L (ref 20–32)
CREAT SERPL-MCNC: 0.77 MG/DL (ref 0.52–1.04)
ERYTHROCYTE [DISTWIDTH] IN BLOOD BY AUTOMATED COUNT: 13 % (ref 10–15)
GFR SERPL CREATININE-BSD FRML MDRD: 77 ML/MIN/{1.73_M2}
GLUCOSE SERPL-MCNC: 96 MG/DL (ref 70–99)
HCT VFR BLD AUTO: 47.1 % (ref 35–47)
HGB BLD-MCNC: 14.9 G/DL (ref 11.7–15.7)
MCH RBC QN AUTO: 29.8 PG (ref 26.5–33)
MCHC RBC AUTO-ENTMCNC: 31.6 G/DL (ref 31.5–36.5)
MCV RBC AUTO: 94 FL (ref 78–100)
PLATELET # BLD AUTO: 297 10E9/L (ref 150–450)
POTASSIUM SERPL-SCNC: 3.9 MMOL/L (ref 3.4–5.3)
RBC # BLD AUTO: 5 10E12/L (ref 3.8–5.2)
SODIUM SERPL-SCNC: 139 MMOL/L (ref 133–144)
WBC # BLD AUTO: 6.3 10E9/L (ref 4–11)

## 2020-12-18 PROCEDURE — 99204 OFFICE O/P NEW MOD 45 MIN: CPT | Performed by: NURSE PRACTITIONER

## 2020-12-18 PROCEDURE — 85027 COMPLETE CBC AUTOMATED: CPT | Performed by: PATHOLOGY

## 2020-12-18 PROCEDURE — 36415 COLL VENOUS BLD VENIPUNCTURE: CPT | Performed by: PATHOLOGY

## 2020-12-18 PROCEDURE — 80048 BASIC METABOLIC PNL TOTAL CA: CPT | Performed by: PATHOLOGY

## 2020-12-18 RX ORDER — ACETAMINOPHEN 325 MG/1
325-650 TABLET ORAL EVERY 6 HOURS PRN
Status: ON HOLD | COMMUNITY
End: 2021-01-11

## 2020-12-18 RX ORDER — DIPHENHYDRAMINE HYDROCHLORIDE 50 MG/ML
50 INJECTION INTRAMUSCULAR; INTRAVENOUS ONCE
Status: CANCELLED | OUTPATIENT
Start: 2020-12-18

## 2020-12-18 RX ORDER — IBUPROFEN 200 MG
CAPSULE ORAL 2 TIMES DAILY
COMMUNITY
End: 2021-02-02

## 2020-12-18 RX ORDER — ACETAMINOPHEN 325 MG/1
650 TABLET ORAL ONCE
Status: CANCELLED | OUTPATIENT
Start: 2020-12-18

## 2020-12-18 SDOH — ECONOMIC STABILITY: INCOME INSECURITY: HOW HARD IS IT FOR YOU TO PAY FOR THE VERY BASICS LIKE FOOD, HOUSING, MEDICAL CARE, AND HEATING?: NOT ASKED

## 2020-12-18 SDOH — ECONOMIC STABILITY: FOOD INSECURITY: WITHIN THE PAST 12 MONTHS, THE FOOD YOU BOUGHT JUST DIDN'T LAST AND YOU DIDN'T HAVE MONEY TO GET MORE.: NOT ASKED

## 2020-12-18 SDOH — ECONOMIC STABILITY: FOOD INSECURITY: WITHIN THE PAST 12 MONTHS, YOU WORRIED THAT YOUR FOOD WOULD RUN OUT BEFORE YOU GOT MONEY TO BUY MORE.: NOT ASKED

## 2020-12-18 SDOH — ECONOMIC STABILITY: TRANSPORTATION INSECURITY
IN THE PAST 12 MONTHS, HAS THE LACK OF TRANSPORTATION KEPT YOU FROM MEDICAL APPOINTMENTS OR FROM GETTING MEDICATIONS?: NOT ASKED

## 2020-12-18 SDOH — ECONOMIC STABILITY: TRANSPORTATION INSECURITY
IN THE PAST 12 MONTHS, HAS LACK OF TRANSPORTATION KEPT YOU FROM MEETINGS, WORK, OR FROM GETTING THINGS NEEDED FOR DAILY LIVING?: NOT ASKED

## 2020-12-18 ASSESSMENT — PAIN SCALES - GENERAL: PAINLEVEL: MILD PAIN (3)

## 2020-12-18 ASSESSMENT — MIFFLIN-ST. JEOR: SCORE: 1191.28

## 2020-12-18 NOTE — H&P
Pre-Operative H & P     CC:  Preoperative exam to assess for increased cardiopulmonary risk while undergoing surgery and anesthesia.    Date of Encounter: 12/18/2020  Primary Care Physician:  No Ref-Primary, Physician  Associated diagnosis: tri-compartment OA of both knees    HPI  Maria Luisa Garcia is a 70 year old female who presents for pre-operative H & P in preparation for a Left total knee arthroplasty on 1/8/20 by Dr. Rodriguez at [facility].     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  She reports that she has been having symptoms for about the last year.  Her symptoms aren't specifically in the knees, but rather she has lower leg pain/aching after walking long distances.  She also notes difficulty with kneeling.  She sought evaluation with orthopedics and was found to have significant bilateral knee OA.  An injection was attempted in the left knee and was helpful in managing her pain, but per patient report, surgery was felt to be the best option due to the severity of the OA.  She has decreased her regular walks from usually 60+ minutes down to 30-45 minutes to help to decrease her symptoms.  She has consulted with Dr. Rodriguez and the above listed surgery has been recommended with the right knee to follow in February.      History is obtained from the patient and the medical record.     Past Medical History  Past Medical History:   Diagnosis Date     Platelet function defect (H)        Past Surgical History  Past Surgical History:   Procedure Laterality Date     ARTHROSCOPIC RECONSTRUCTION ANTERIOR CRUCIATE LIGAMENT      left     ARTHROSCOPY KNEE      right     BACK SURGERY      lumbar     CARPAL TUNNEL RELEASE RT/LT      bilateral     COLONOSCOPY       REPAIR HAMMER TOE BILATERAL Bilateral 12/10/2014    Procedure: REPAIR HAMMER TOE BILATERAL;  Surgeon: Catrachito Estrella MD;  [facility]      WRIST SURGERY      right, fracture repair       Hx of Blood transfusions/reactions: yes, reaction to platelet transfusion (hives and anaphylaxis)    Hx of abnormal bleeding or anti-platelet use: platelet function defect    Menstrual history: No LMP recorded. Patient is postmenopausal.:     Steroid use in the last year: none known     Personal or FH with difficulty with Anesthesia:  Patient has a history of respiratory and cardiac arrest during  in  due to poor epidural placement, but has no family history of anesthesia complications.      Prior to Admission Medications  Current Outpatient Medications   Medication Sig Dispense Refill     acetaminophen (TYLENOL) 325 MG tablet Take 325-650 mg by mouth every 6 hours as needed for mild pain       calcium carbonate 500 mg, elemental, (OSCAL 500) 1250 (500 Ca) MG TABS tablet Take by mouth 2 times daily       Cholecalciferol (VITAMIN D3 PO) Take 10,000 Units by mouth every morning        Coenzyme Q10 (COQ10) 400 MG CAPS Take by mouth every morning        Glucosamine-Chondroit-Vit C-Mn (GLUCOSAMINE CHONDR 1500 COMPLX PO) Take by mouth every morning        MAGNESIUM OXIDE PO Take 500 mg by mouth every morning          Allergies  Allergies   Allergen Reactions     Cleocin Anaphylaxis     Codeine Nausea and Vomiting       Social History  Social History     Socioeconomic History     Marital status:      Spouse name: Not on file     Number of children: 2     Years of education: Not on file     Highest education level: Not on file   Occupational History     Occupation: RN,    Social Needs     Financial resource strain: Not on file     Food insecurity     Worry: Not on file     Inability: Not on file     Transportation needs     Medical: Not on file     Non-medical: Not on file   Tobacco Use     Smoking status: Never Smoker     Smokeless tobacco: Never Used   Substance and Sexual Activity     Alcohol use: Yes     Comment: socially     Drug use:  No     Sexual activity: Never   Lifestyle     Physical activity     Days per week: Not on file     Minutes per session: Not on file     Stress: Not on file   Relationships     Social connections     Talks on phone: Not on file     Gets together: Not on file     Attends Hoahaoism service: Not on file     Active member of club or organization: Not on file     Attends meetings of clubs or organizations: Not on file     Relationship status: Not on file     Intimate partner violence     Fear of current or ex partner: Not on file     Emotionally abused: Not on file     Physically abused: Not on file     Forced sexual activity: Not on file   Other Topics Concern     Parent/sibling w/ CABG, MI or angioplasty before 65F 55M? Not Asked   Social History Narrative     Not on file       Family History  Family History   Problem Relation Age of Onset     Hypertension Mother      Melanoma Mother 84        malignant melanoma of ethmoid sinus     Diabetes Father      Hypertension Father      Parkinsonism Father      Hypertension Sister      No Known Problems Brother      Deep Vein Thrombosis No family hx of      Anesthesia Reaction No family hx of          ROS/MED HX  The complete review of systems is negative other than noted in the HPI or here.   ENT/Pulmonary:      (-) LASHONDA risk factors and recent URI   Neurologic:  - neg neurologic ROS     Cardiovascular:  - neg cardiovascular ROS   (+) ----. : . . . :. . No previous cardiac testing       METS/Exercise Tolerance:  >4 METS   Hematologic:     (+) History of Transfusion previous transfusion reaction - hives, anaphylaxis (platelet transfusion), Other Hematologic Disorder-platelet function defect     (-) history of blood clots   Musculoskeletal:   (+) arthritis,  other musculoskeletal- bilateral lower leg pain      GI/Hepatic:  - neg GI/hepatic ROS       Renal/Genitourinary:  - ROS Renal section negative       Endo:  - neg endo ROS       Psychiatric:  - neg psychiatric ROS      "  Infectious Disease:  - neg infectious disease ROS      (-) Recent Fever   Malignancy:      - no malignancy   Other:    (+) No chance of pregnancy no H/O Chronic Pain,           Temp: 98  F (36.7  C) Temp src: Oral BP: (!) 172/96 Pulse: 95   Resp: 12 SpO2: 98 %         157 lbs 0 oz  5' 3\"[pt reported[   Body mass index is 27.81 kg/m .       Physical Exam  Constitutional: Awake, alert, cooperative, no apparent distress, and appears stated age.  Eyes: Pupils equal, round and reactive to light, extra ocular muscles intact, sclera clear, conjunctiva normal.  HENT: Normocephalic, oral pharynx with moist mucus membranes, good dentition. No goiter appreciated.   Respiratory: Clear to auscultation bilaterally, no crackles or wheezing.  Cardiovascular: Regular rate and rhythm, normal S1 and S2, and no murmur noted.  Carotids +2, no bruits. No edema. Palpable pulses to radial  DP and PT arteries.   GI: Normal bowel sounds, soft, non-distended, non-tender, no masses palpated, no hepatosplenomegaly.    Lymph/Hematologic: No cervical lymphadenopathy and no supraclavicular lymphadenopathy.  Genitourinary:  deferred  Skin: Warm and dry.  No rashes at anticipated surgical site.   Musculoskeletal: Full ROM of neck. There is no redness, warmth, or swelling of the exposed joints. Gross motor strength is normal.    Neurologic: Awake, alert, oriented to name, place and time. Cranial nerves II-XII are grossly intact. Gait is normal.   Neuropsychiatric: Calm, cooperative. Normal affect.     Labs: (personally reviewed)   Component      Latest Ref Rng & Units 12/18/2020   Sodium      133 - 144 mmol/L 139   Potassium      3.4 - 5.3 mmol/L 3.9   Chloride      94 - 109 mmol/L 104   Carbon Dioxide      20 - 32 mmol/L 28   Anion Gap      3 - 14 mmol/L 6   Glucose      70 - 99 mg/dL 96   Urea Nitrogen      7 - 30 mg/dL 17   Creatinine      0.52 - 1.04 mg/dL 0.77   GFR Estimate      >60 mL/min/1.73:m2 77   GFR Estimate If Black      >60 " "mL/min/1.73:m2 89   Calcium      8.5 - 10.1 mg/dL 9.4   WBC      4.0 - 11.0 10e9/L 6.3   RBC Count      3.8 - 5.2 10e12/L 5.00   Hemoglobin      11.7 - 15.7 g/dL 14.9   Hematocrit      35.0 - 47.0 % 47.1 (H)   MCV      78 - 100 fl 94   MCH      26.5 - 33.0 pg 29.8   MCHC      31.5 - 36.5 g/dL 31.6   RDW      10.0 - 15.0 % 13.0   Platelet Count      150 - 450 10e9/L 297           ASSESSMENT and PLAN  Maria Luisa Garcia is a 70 year old female scheduled for a Left total knee arthroplasty on 20 by Dr. Rodriguez in treatment of tri-compartment OA of both knees.  PAC referral for risk assessment and optimization for anesthesia with comorbid conditions of: platelet function defect.      Pre-operative considerations:  1.  Cardiac:  Functional status- METS >4.  She has been walking 30-45 minutes regularly for exercise.  Blood pressure is elevate x 2 in clinic today.  Patient reports she always has, \"white coat hypertension.\"  She notes that when she checks her blood pressure at [employer] or work it has been running 130-140's/70-80's.  Intermediate risk surgery with 0.4% risk of major adverse cardiac event.   2.  Pulm:  Airway feasible.  LASHONDA risk: low.    3.  GI:  Risk of PONV score = 3.  If > 2, anti-emetic intervention recommended.   4. Anesthesia:  She reports history of respiratory and cardiac arrest in  during a  due to the epidural being placed to high.    5. Heme:  VTE risk: 0.5%  She has a history of an unspecified platelet function defect.  She also has a history of a significant reaction to a platelet transfusion.  She consulted with Dr. Dotson on 20.  The follow are her recommendations:    Recommendations for surgery:  1. Transfuse 1 unit of platelets immediately pre-operatively  2. Give benadryl 50 mg IV and acetominophen 650 mg as premeds prior to platelet transfusion  3. If excessive bleeding during, or within 24 hours of procedure, give another 1 unit of platelets with premeds.   4. Start apixiban " (Eliquis) 2.5 mg po bid (prophylactic dose) 48 hours after surgery and continue for 14 days.   5. Page Dr. Dotson [phone], or hematology consult service if Dr. Dotson not answering, if there is bleeding or other concerns regarding bleeding/clotting management.      Platelets, tylenol and benadryl have been ordered for pre-op.  Patient will get T&S done w/in 72 hours prior to surgery since it cannot be extended due to her history of transfusion reaction.  .     **Addendum(12/24/20):  Spinal is the currently ordered anesthesia plan.  Staff messaged Dr. Dotson with concern for this due to the platelet function defect.  She recommended avoiding spinal anesthesia.  Please consider GA.  Message sent to Dr. Rodriguez to notify. **    Patient is optimized and is acceptable candidate for the proposed procedure.  No further diagnostic evaluation is needed.     Patient discussed with Dr. Bah.              Lisbeth Espana DNP, RN, APRN  Preoperative Assessment Center  [facility]  [facility]  Phone: [phone]  Fax: [phone]

## 2020-12-18 NOTE — ANESTHESIA PREPROCEDURE EVALUATION
"Anesthesia Pre-Procedure Evaluation    Patient: Maria Luisa Garcia   MRN:     [id number] Gender:   female   Age:    70 year old :      1950        Preoperative Diagnosis: * No surgery found *        LABS:  CBC:   Lab Results   Component Value Date    WBC 6.2 2010    WBC 7.1 2010    HGB 14.8 2010    HGB 14.5 2010    HCT 43.9 2010    HCT 43.7 2010     2010     2010     BMP:   Lab Results   Component Value Date    POTASSIUM 4.0 2010     COAGS: No results found for: PTT, INR, FIBR  POC: No results found for: BGM, HCG, HCGS  OTHER: No results found for: PH, LACT, A1C, MARI, PHOS, MAG, ALBUMIN, PROTTOTAL, ALT, AST, GGT, ALKPHOS, BILITOTAL, BILIDIRECT, LIPASE, AMYLASE, LEODAN, TSH, T4, T3, CRP, SED     Preop Vitals    BP Readings from Last 3 Encounters:   20 (!) 161/104   19 (!) 178/101   12/31/15 154/90    Pulse Readings from Last 3 Encounters:   20 95   19 99   16 98      Resp Readings from Last 3 Encounters:   20 12   16 16   12/31/15 20    SpO2 Readings from Last 3 Encounters:   20 98%   12/10/14 96%   14 98%      Temp Readings from Last 1 Encounters:   20 98  F (36.7  C) (Oral)    Ht Readings from Last 1 Encounters:   20 1.6 m (5' 3\")      Wt Readings from Last 1 Encounters:   20 71.2 kg (157 lb)    Estimated body mass index is 27.81 kg/m  as calculated from the following:    Height as of this encounter: 1.6 m (5' 3\").    Weight as of this encounter: 71.2 kg (157 lb).     LDA:        Past Medical History:   Diagnosis Date     Platelet function defect (H)       Past Surgical History:   Procedure Laterality Date     ARTHROSCOPIC RECONSTRUCTION ANTERIOR CRUCIATE LIGAMENT      left     ARTHROSCOPY KNEE      right     BACK SURGERY      lumbar     CARPAL TUNNEL RELEASE RT/LT      bilateral     COLONOSCOPY       REPAIR HAMMER TOE BILATERAL Bilateral 12/10/2014    Procedure: REPAIR " HAMMER TOE BILATERAL;  Surgeon: Catrachito Estrella MD;  [facility]     WRIST SURGERY      right, fracture repair      Allergies   Allergen Reactions     Cleocin Anaphylaxis     Codeine Nausea and Vomiting        Anesthesia Evaluation     . Pt has had prior anesthetic. Type: General and Regional    History of anesthetic complications    respiratory and cardiac arrest secoondary to poor epidural placement      ROS/MED HX    ENT/Pulmonary:      (-) LASHONDA risk factors and recent URI   Neurologic:  - neg neurologic ROS     Cardiovascular:  - neg cardiovascular ROS   (+) ----. : . . . :. . No previous cardiac testing       METS/Exercise Tolerance:  >4 METS   Hematologic:     (+) History of Transfusion previous transfusion reaction - hives, anaphylaxis (platelet transfusion), Other Hematologic Disorder-platelet function defect     (-) history of blood clots   Musculoskeletal:   (+) arthritis,  other musculoskeletal- bilateral lower leg pain      GI/Hepatic:  - neg GI/hepatic ROS       Renal/Genitourinary:  - ROS Renal section negative       Endo:  - neg endo ROS       Psychiatric:  - neg psychiatric ROS       Infectious Disease:  - neg infectious disease ROS      (-) Recent Fever   Malignancy:      - no malignancy   Other:    (+) No chance of pregnancy no H/O Chronic Pain,                       PHYSICAL EXAM:   Mental Status/Neuro:    Airway:    Respiratory:    CV:    Comments: Multiple bridges     Dental: Details                JZG FV AN PLAN NO PONV RULE       PAC Discussion and Assessment    ASA Classification: 2  Case is suitable for: [place]  Anesthetic techniques and relevant risks discussed:   Invasive monitoring and risk discussed:   Types:   Possibility and Risk of blood transfusion discussed:   NPO instructions given:   Additional anesthetic preparation and risks discussed:   Needs early admission to pre-op area:   Other:     PAC Resident/NP Anesthesia Assessment:  Maria Luisa Garcia is a 70 year old female  "scheduled for a Left total knee arthroplasty on 20 by Dr. Rodriguez in treatment of tri-compartment OA of both knees.  PAC referral for risk assessment and optimization for anesthesia with comorbid conditions of: platelet function defect.      Pre-operative considerations:  1.  Cardiac:  Functional status- METS >4.  She has been walking 30-45 minutes regularly for exercise.  Blood pressure is elevate x 2 in clinic today.  Patient reports she always has, \"white coat hypertension.\"  She notes that when she checks her blood pressure at [employer] or work it has been running 130-140's/70-80's.  Intermediate risk surgery with 0.4% risk of major adverse cardiac event.   2.  Pulm:  Airway feasible.  LASHONDA risk: low.    3.  GI:  Risk of PONV score = 3.  If > 2, anti-emetic intervention recommended.   4. Anesthesia:  She reports history of respiratory and cardiac arrest in  during a  due to the epidural being placed to high.    5. Heme:  VTE risk: 0.5%  She has a history of an unspecified platelet function defect.  She also has a history of a significant reaction to a platelet transfusion.  She consulted with Dr. Dotson on 20.  The follow are her recommendations:    Recommendations for surgery:  1. Transfuse 1 unit of platelets immediately pre-operatively  2. Give benadryl 50 mg IV and acetominophen 650 mg as premeds prior to platelet transfusion  3. If excessive bleeding during, or within 24 hours of procedure, give another 1 unit of platelets with premeds.   4. Start apixiban (Eliquis) 2.5 mg po bid (prophylactic dose) 48 hours after surgery and continue for 14 days.   5. Page Dr. Dotson [phone], or hematology consult service if Dr. Dotson not answering, if there is bleeding or other concerns regarding bleeding/clotting management.      Platelets, tylenol and benadryl have been ordered for pre-op.  Patient will get T&S done w/in 72 hours prior to surgery since it cannot be extended due to her history of " transfusion reaction.  .     **Addendum(12/24/20):  Spinal is the currently ordered anesthesia plan.  Staff messaged Dr. Dotson with concern for this due to the platelet function defect.  She recommended avoiding spinal anesthesia.  Please consider GA.  Message sent to Dr. Rodriguez to notify. **    Patient is optimized and is acceptable candidate for the proposed procedure.  No further diagnostic evaluation is needed.     Patient discussed with Dr. Bah.      **For further details of assessment, testing, and physical exam please see H and P completed on same date.          Lisbeth Espana DNP, RN, APRN      Reviewed and Signed by PAC Mid-Level Provider/Resident  Mid-Level Provider/Resident: Lisbeth Espana DNP, RN, APRN  Date: 12/18/20  Time: 1412    Attending Anesthesiologist Anesthesia Assessment:  Preop orders placed for platelet transfusion, pre-transfusion benadryl 50 mg IV and acetominophen 650 mg (per Dr. Dotson's Hematology note 12/7/2020). Given her platelet function defect, would avoid neuraxial anesthesia, as above. Final plan per DOS anesthesiologist.       Anesthesiologist: Juanita Bah MD  Date: 12/18/2020  Time:   Pass/Fail:   Disposition:     PAC Pharmacist Assessment:        Pharmacist:   Date:   Time:    Lisbeth Espana, RAMON CNP

## 2020-12-18 NOTE — PATIENT INSTRUCTIONS
Preparing for Your Surgery      Name:  Maria Luisa Garcia   MRN:  [id number]   :  1950   Today's Date:  2020       Arriving for surgery:  Surgery date:  2021  Arrival time:  5:30AM    Restrictions due to COVID 19:  No visitors are allowed at this time.    [employer] parking is available for anyone with mobility limitations or disabilities.  ([place]  24 hours/ 7 days a week; [facility]  7 am- 3:30 pm, Mon- Fri)    Please come to:     [facility] [address]  [address] [place]  [address]    -Proceed to the 3rd floor, check in at the Adult Surgery Waiting Lounge. [phone]    If an escort is needed stop at the Information Desk in the lobby. Inform the information person that you are here for surgery. An escort to the Adult Surgery Waiting Lounge will be provided.        What can I eat or drink?  -  You may eat and drink normally for up to 8 hours before your surgery. (Until 21, 11:30PM)  -  You may have clear liquids until 2 hours before surgery. (Until 21, 5:30AM)    Examples of clear liquids:  Water  Clear broth  Juices (apple, white grape, white cranberry  and cider) without pulp  Noncarbonated, powder based beverages  (lemonade and Rocky-Aid)  Sodas (Sprite, 7-Up, ginger ale and seltzer)  Coffee or tea (without milk or cream)  Gatorade    -  No Alcohol for at least 24 hours before surgery     Which medicines can I take?    Hold Aspirin for 7 days before surgery.   Hold Multivitamins for 7 days before surgery.  Hold Supplements for 7 days before surgery.  Hold Ibuprofen (Advil, Motrin) for 1 day before surgery--unless otherwise directed by surgeon.  Hold Naproxen (Aleve) for 4 days before surgery.    -  DO NOT take these medications the day of surgery:    Calcium   Vitamin D3    -  PLEASE TAKE these medications the day of surgery:    Acetaminophen(Tylenol) as needed    How do I prepare myself?  - Please take 2 showers before surgery using Scrubcare or  Hibiclens soap.    Use this soap only from the neck to your toes.     Leave the soap on your skin for one minute--then rinse thoroughly.      You may use your own shampoo and conditioner; no other hair products.   - Please remove all jewelry and body piercings.  - No lotions, deodorants or fragrance.  - No makeup or fingernail polish.   - Bring your ID and insurance card.  -Begin using the Incentive Spirometer one week prior to your surgery, 4 times per day-5 times each.    - All patients are required to have a Covid-19 test within 4 days of surgery/procedure.      -Patients will be contacted by the [facility] scheduling team within 1 week of surgery to make an appointment.      - Patients may call the Scheduling team at [phone] if they have not been scheduled within 4 days of  surgery.        Questions or Concerns:    - For any questions regarding the day of surgery or your hospital stay, please contact the Pre Admission Nursing Office at [phone].       - If you have health changes between today and your surgery please call your surgeon.       For questions after surgery please call your surgeons office.           Enhanced Recovery After Surgery     This is a team effort, including you, to get you back on your feet, eating and drinking normally and out of the hospital as quickly as possible.  The goals are: 1) NO INFECTIONS and   2) RETURN TO NORMAL DIET    How can we achieve these goals?  1) STAY ACTIVE: Walk every day before your surgery; try to increase the amount every day.  Walk after surgery as much as you can-the nurses will help you.  Walking speeds healing and gets you home quicker, you heal better at home and have less risk of infection.     2) INCENTIVE SPIROMETER: Practice your incentive spirometer 4 times per day with 5 repetitions each time.  Using the incentive spirometer can strengthen your muscles between your ribs and help you have a strong cough after surgery.  A more effective  cough can help prevent problems with your lungs.    3) STAY HYDRATED: Drink clear liquids up until 2 hours before your surgery. We would like you to purchase a drink such as Gatorade or Ensure Clear (not the milkshake type).  Drink this before bedtime and on the way into the hospital, drink between 8-10 ounces or until you feel hydrated.  Keeping well hydrated leads to your veins being plump, you wake up faster, and you are less likely to be nauseated. Start drinking water as soon as you can after surgery and advance to clear liquids and food as tolerated.  IV fluids contain salt, drinking fluids will minimize the amount of IV fluids you need and decrease the amount of salt you get.    The most common reason for the patient to be readmitted is dehydration. Staying hydrated after you go home from the hospital is very important.  Ensure or Ensure Clear are good options to keep you hydrated.     4) PAIN MANAGEMENT: If we minimize the amount of opioids and narcotics, and use regional blocks (which numb the area where your surgery is) along with oral pain medications; you will have less side effects of nausea and constipation. Narcotics can slow down your bowels and cause you to stay in the hospital longer.     Our goal is to keep you comfortable; eating and drinking normally and back home safely.     Using an Incentive Spirometer    An incentive spirometer is a device that helps you do deep breathing exercises. These exercises expand your lungs, aid in circulation, and help prevent pneumonia. Deep breathing exercises also help you breathe better and improve the function of your lungs by:    Keeping your lungs clear    Strengthening your breathing muscles    Helping prevent respiratory complications or problems  The incentive spirometer gives you a way to take an active part in your care. A nurse or therapist will teach you breathing exercises. To do these exercises, you will breathe in through your mouth and not your  nose. The incentive spirometer only works correctly if you breathe in through your mouth.    Steps to clear lungs  Step 1. Exhale normally. Then, inhale normally.    Relax and breathe out.  Step 2. Place your lips tightly around the mouthpiece.    Make sure the device is upright and not tilted.  Step 3. Inhale as much air as you can through the mouthpiece (don't breath through your nose).    Inhale slowly and deeply.    Hold your breath long enough to keep the balls or disk raised for at least 3 to 5 seconds, or as instructed by your healthcare provider.  Step 4. Repeat the exercise regularly.  Begin using the Incentive Spirometer one week prior to your surgery, 4 times per day-5 times each.

## 2020-12-22 NOTE — RESULT ENCOUNTER NOTE
Chacorta Glass,    Your test results are attached.  Both of your labs are good for surgery.   Please remember to do your type and screen within 3 days prior to surgery.  Thanks!        Lisbeth Espana DNP, RN, ANP-C

## 2020-12-29 ENCOUNTER — PREP FOR PROCEDURE (OUTPATIENT)
Dept: ORTHOPEDICS | Facility: CLINIC | Age: 70
End: 2020-12-29

## 2021-01-02 ENCOUNTER — MYC MEDICAL ADVICE (OUTPATIENT)
Dept: SURGERY | Facility: CLINIC | Age: 71
End: 2021-01-02

## 2021-01-03 ENCOUNTER — ANESTHESIA EVENT (OUTPATIENT)
Dept: SURGERY | Facility: CLINIC | Age: 71
DRG: 983 | End: 2021-01-03
Payer: COMMERCIAL

## 2021-01-04 DIAGNOSIS — M17.0 TRICOMPARTMENT OSTEOARTHRITIS OF BOTH KNEES: Primary | ICD-10-CM

## 2021-01-05 DIAGNOSIS — Z11.59 ENCOUNTER FOR SCREENING FOR OTHER VIRAL DISEASES: ICD-10-CM

## 2021-01-05 DIAGNOSIS — Z01.818 PRE-OP TESTING: ICD-10-CM

## 2021-01-05 DIAGNOSIS — M17.0 TRICOMPARTMENT OSTEOARTHRITIS OF BOTH KNEES: Primary | ICD-10-CM

## 2021-01-05 LAB
SARS-COV-2 RNA SPEC QL NAA+PROBE: NORMAL
SPECIMEN SOURCE: NORMAL

## 2021-01-05 PROCEDURE — U0005 INFEC AGEN DETEC AMPLI PROBE: HCPCS | Performed by: ORTHOPAEDIC SURGERY

## 2021-01-05 PROCEDURE — U0003 INFECTIOUS AGENT DETECTION BY NUCLEIC ACID (DNA OR RNA); SEVERE ACUTE RESPIRATORY SYNDROME CORONAVIRUS 2 (SARS-COV-2) (CORONAVIRUS DISEASE [COVID-19]), AMPLIFIED PROBE TECHNIQUE, MAKING USE OF HIGH THROUGHPUT TECHNOLOGIES AS DESCRIBED BY CMS-2020-01-R: HCPCS | Performed by: ORTHOPAEDIC SURGERY

## 2021-01-06 LAB
LABORATORY COMMENT REPORT: NORMAL
SARS-COV-2 RNA RESP QL NAA+PROBE: NEGATIVE
SPECIMEN SOURCE: NORMAL

## 2021-01-07 DIAGNOSIS — Z01.818 PRE-OP TESTING: ICD-10-CM

## 2021-01-07 DIAGNOSIS — M17.0 TRICOMPARTMENT OSTEOARTHRITIS OF BOTH KNEES: ICD-10-CM

## 2021-01-07 LAB
ABO + RH BLD: NORMAL
ABO + RH BLD: NORMAL
BLD GP AB SCN SERPL QL: NORMAL
BLOOD BANK CMNT PATIENT-IMP: NORMAL
SPECIMEN EXP DATE BLD: NORMAL

## 2021-01-07 PROCEDURE — 36415 COLL VENOUS BLD VENIPUNCTURE: CPT | Performed by: ORTHOPAEDIC SURGERY

## 2021-01-07 PROCEDURE — 86900 BLOOD TYPING SEROLOGIC ABO: CPT | Performed by: ORTHOPAEDIC SURGERY

## 2021-01-07 PROCEDURE — 86901 BLOOD TYPING SEROLOGIC RH(D): CPT | Performed by: ORTHOPAEDIC SURGERY

## 2021-01-07 PROCEDURE — 86850 RBC ANTIBODY SCREEN: CPT | Performed by: ORTHOPAEDIC SURGERY

## 2021-01-07 NOTE — ANESTHESIA PREPROCEDURE EVALUATION
"Anesthesia Pre-Procedure Evaluation    Patient: Maria Luisa Garcia   MRN:     [id number] Gender:   female   Age:    70 year old :      1950        Preoperative Diagnosis: Tricompartment osteoarthritis of both knees [M17.0]   Procedure(s):  Left total knee arthroplasty     LABS:  CBC:   Lab Results   Component Value Date    WBC 6.3 2020    WBC 6.2 2010    HGB 14.9 2020    HGB 14.8 2010    HCT 47.1 (H) 2020    HCT 43.9 2010     2020     2010     BMP:   Lab Results   Component Value Date     2020    POTASSIUM 3.9 2020    POTASSIUM 4.0 2010    CHLORIDE 104 2020    CO2 28 2020    BUN 17 2020    CR 0.77 2020    GLC 96 2020     COAGS: No results found for: PTT, INR, FIBR  POC: No results found for: BGM, HCG, HCGS  OTHER:   Lab Results   Component Value Date    MARI 9.4 2020        Preop Vitals    BP Readings from Last 3 Encounters:   20 (!) 172/96   19 (!) 178/101   12/31/15 154/90    Pulse Readings from Last 3 Encounters:   20 95   19 99   16 98      Resp Readings from Last 3 Encounters:   20 12   16 16   12/31/15 20    SpO2 Readings from Last 3 Encounters:   20 98%   12/10/14 96%   14 98%      Temp Readings from Last 1 Encounters:   20 36.7  C (98  F) (Oral)    Ht Readings from Last 1 Encounters:   20 1.6 m (5' 3\")      Wt Readings from Last 1 Encounters:   20 71.2 kg (157 lb)    Estimated body mass index is 27.81 kg/m  as calculated from the following:    Height as of 20: 1.6 m (5' 3\").    Weight as of 20: 71.2 kg (157 lb).     LDA:        Past Medical History:   Diagnosis Date     Platelet function defect (H)       Past Surgical History:   Procedure Laterality Date     ARTHROSCOPIC RECONSTRUCTION ANTERIOR CRUCIATE LIGAMENT      left     ARTHROSCOPY KNEE      right     BACK SURGERY      lumbar     CARPAL TUNNEL " RELEASE RT/LT      bilateral     COLONOSCOPY       REPAIR HAMMER TOE BILATERAL Bilateral 12/10/2014    Procedure: REPAIR HAMMER TOE BILATERAL;  Surgeon: Catrachito Estrella MD;  Location: [facility]     WRIST SURGERY      right, fracture repair      Allergies   Allergen Reactions     Cleocin Anaphylaxis     Codeine Nausea and Vomiting                 PHYSICAL EXAM:   Mental Status/Neuro:    Airway: Facies: Feasible  Mallampati: II  Mouth/Opening: Full  TM distance: > 6 cm  Neck ROM: Full   Respiratory: Auscultation: CTAB     Resp. Rate: Normal     Resp. Effort: Normal      CV: Rhythm: Regular  Rate: Age appropriate  Heart: Normal Sounds  Edema: None   Comments:      Dental: Details                  Assessment:   ASA SCORE: 2            Plan:   Anes. Type:  General   Pre-Medication: None   Induction:  IV (Standard)   Airway: LMA   Access/Monitoring: PIV   Maintenance: Balanced     Postop Plan:   Postop Pain: Opioids  Postop Sedation/Airway: Not planned     PONV Management:   Adult Risk Factors: Female, Postop Opioids                   Luc Marinelli DO

## 2021-01-08 ENCOUNTER — APPOINTMENT (OUTPATIENT)
Dept: PHYSICAL THERAPY | Facility: CLINIC | Age: 71
DRG: 983 | End: 2021-01-08
Attending: ORTHOPAEDIC SURGERY
Payer: COMMERCIAL

## 2021-01-08 ENCOUNTER — HOSPITAL ENCOUNTER (INPATIENT)
Facility: CLINIC | Age: 71
LOS: 1 days | Discharge: HOME | DRG: 983 | End: 2021-01-11
Attending: ORTHOPAEDIC SURGERY | Admitting: ORTHOPAEDIC SURGERY
Payer: COMMERCIAL

## 2021-01-08 ENCOUNTER — APPOINTMENT (OUTPATIENT)
Dept: GENERAL RADIOLOGY | Facility: CLINIC | Age: 71
DRG: 983 | End: 2021-01-08
Attending: STUDENT IN AN ORGANIZED HEALTH CARE EDUCATION/TRAINING PROGRAM
Payer: COMMERCIAL

## 2021-01-08 ENCOUNTER — ANESTHESIA (OUTPATIENT)
Dept: SURGERY | Facility: CLINIC | Age: 71
DRG: 983 | End: 2021-01-08
Payer: COMMERCIAL

## 2021-01-08 DIAGNOSIS — M17.0 TRICOMPARTMENT OSTEOARTHRITIS OF BOTH KNEES: Primary | ICD-10-CM

## 2021-01-08 DIAGNOSIS — Z98.890 POST-OPERATIVE STATE: ICD-10-CM

## 2021-01-08 LAB
BLD PROD TYP BPU: NORMAL
BLD UNIT ID BPU: 0
BLOOD PRODUCT CODE: NORMAL
BPU ID: NORMAL
GLUCOSE BLDC GLUCOMTR-MCNC: 97 MG/DL (ref 70–99)
TRANSFUSION STATUS PATIENT QL: NORMAL
TRANSFUSION STATUS PATIENT QL: NORMAL

## 2021-01-08 PROCEDURE — 250N000011 HC RX IP 250 OP 636: Performed by: NURSE ANESTHETIST, CERTIFIED REGISTERED

## 2021-01-08 PROCEDURE — 258N000003 HC RX IP 258 OP 636: Performed by: NURSE PRACTITIONER

## 2021-01-08 PROCEDURE — 272N000001 HC OR GENERAL SUPPLY STERILE: Performed by: ORTHOPAEDIC SURGERY

## 2021-01-08 PROCEDURE — 250N000013 HC RX MED GY IP 250 OP 250 PS 637: Performed by: NURSE PRACTITIONER

## 2021-01-08 PROCEDURE — 258N000003 HC RX IP 258 OP 636: Performed by: STUDENT IN AN ORGANIZED HEALTH CARE EDUCATION/TRAINING PROGRAM

## 2021-01-08 PROCEDURE — 250N000011 HC RX IP 250 OP 636: Performed by: NURSE PRACTITIONER

## 2021-01-08 PROCEDURE — 999N001017 HC STATISTIC GLUCOSE BY METER IP

## 2021-01-08 PROCEDURE — 258N000001 HC RX 258: Performed by: ORTHOPAEDIC SURGERY

## 2021-01-08 PROCEDURE — 97116 GAIT TRAINING THERAPY: CPT | Mod: GP | Performed by: PHYSICAL THERAPIST

## 2021-01-08 PROCEDURE — 250N000011 HC RX IP 250 OP 636: Performed by: ANESTHESIOLOGY

## 2021-01-08 PROCEDURE — 258N000003 HC RX IP 258 OP 636

## 2021-01-08 PROCEDURE — 278N000051 HC OR IMPLANT GENERAL: Performed by: ORTHOPAEDIC SURGERY

## 2021-01-08 PROCEDURE — 258N000003 HC RX IP 258 OP 636: Performed by: NURSE ANESTHETIST, CERTIFIED REGISTERED

## 2021-01-08 PROCEDURE — 370N000017 HC ANESTHESIA TECHNICAL FEE, PER MIN: Performed by: ORTHOPAEDIC SURGERY

## 2021-01-08 PROCEDURE — 258N000003 HC RX IP 258 OP 636: Performed by: ANESTHESIOLOGY

## 2021-01-08 PROCEDURE — 250N000025 HC SEVOFLURANE, PER MIN: Performed by: ORTHOPAEDIC SURGERY

## 2021-01-08 PROCEDURE — 0SRD0J9 REPLACEMENT OF LEFT KNEE JOINT WITH SYNTHETIC SUBSTITUTE, CEMENTED, OPEN APPROACH: ICD-10-PCS | Performed by: ORTHOPAEDIC SURGERY

## 2021-01-08 PROCEDURE — 250N000009 HC RX 250: Performed by: ORTHOPAEDIC SURGERY

## 2021-01-08 PROCEDURE — 97110 THERAPEUTIC EXERCISES: CPT | Mod: GP | Performed by: PHYSICAL THERAPIST

## 2021-01-08 PROCEDURE — 99207 PR CONSULT E&M CHANGED TO SUBSEQUENT LEVEL: CPT | Performed by: INTERNAL MEDICINE

## 2021-01-08 PROCEDURE — 250N000009 HC RX 250: Performed by: ANESTHESIOLOGY

## 2021-01-08 PROCEDURE — 999N000141 HC STATISTIC PRE-PROCEDURE NURSING ASSESSMENT: Performed by: ORTHOPAEDIC SURGERY

## 2021-01-08 PROCEDURE — 250N000011 HC RX IP 250 OP 636: Performed by: STUDENT IN AN ORGANIZED HEALTH CARE EDUCATION/TRAINING PROGRAM

## 2021-01-08 PROCEDURE — P9037 PLATE PHERES LEUKOREDU IRRAD: HCPCS | Performed by: NURSE PRACTITIONER

## 2021-01-08 PROCEDURE — 73560 X-RAY EXAM OF KNEE 1 OR 2: CPT | Mod: 26 | Performed by: RADIOLOGY

## 2021-01-08 PROCEDURE — 99212 OFFICE O/P EST SF 10 MIN: CPT | Performed by: INTERNAL MEDICINE

## 2021-01-08 PROCEDURE — 250N000009 HC RX 250: Performed by: NURSE ANESTHETIST, CERTIFIED REGISTERED

## 2021-01-08 PROCEDURE — 360N000077 HC SURGERY LEVEL 4, PER MIN: Performed by: ORTHOPAEDIC SURGERY

## 2021-01-08 PROCEDURE — 710N000010 HC RECOVERY PHASE 1, LEVEL 2, PER MIN: Performed by: ORTHOPAEDIC SURGERY

## 2021-01-08 PROCEDURE — 97530 THERAPEUTIC ACTIVITIES: CPT | Mod: GP | Performed by: PHYSICAL THERAPIST

## 2021-01-08 PROCEDURE — 97161 PT EVAL LOW COMPLEX 20 MIN: CPT | Mod: GP | Performed by: PHYSICAL THERAPIST

## 2021-01-08 PROCEDURE — C1713 ANCHOR/SCREW BN/BN,TIS/BN: HCPCS | Performed by: ORTHOPAEDIC SURGERY

## 2021-01-08 PROCEDURE — C1776 JOINT DEVICE (IMPLANTABLE): HCPCS | Performed by: ORTHOPAEDIC SURGERY

## 2021-01-08 PROCEDURE — 999N000065 XR KNEE PORT LT 1/2 VW: Mod: LT

## 2021-01-08 PROCEDURE — 250N000013 HC RX MED GY IP 250 OP 250 PS 637: Performed by: STUDENT IN AN ORGANIZED HEALTH CARE EDUCATION/TRAINING PROGRAM

## 2021-01-08 PROCEDURE — 250N000013 HC RX MED GY IP 250 OP 250 PS 637: Performed by: INTERNAL MEDICINE

## 2021-01-08 DEVICE — TIBIAL BEARING INSERT - CS
Type: IMPLANTABLE DEVICE | Site: KNEE | Status: FUNCTIONAL
Brand: TRIATHLON

## 2021-01-08 DEVICE — UNIVERSAL TIBIAL BASEPLATE
Type: IMPLANTABLE DEVICE | Site: KNEE | Status: FUNCTIONAL
Brand: TRIATHLON

## 2021-01-08 DEVICE — BONE CEMENT SIMPLEX W/TOBRAMYCIN 6197-9-001: Type: IMPLANTABLE DEVICE | Site: KNEE | Status: FUNCTIONAL

## 2021-01-08 DEVICE — CRUCIATE RETAINING FEMORAL
Type: IMPLANTABLE DEVICE | Site: KNEE | Status: FUNCTIONAL
Brand: TRIATHLON

## 2021-01-08 DEVICE — PATELLA
Type: IMPLANTABLE DEVICE | Site: KNEE | Status: FUNCTIONAL
Brand: TRIATHLON

## 2021-01-08 RX ORDER — ACETAMINOPHEN 325 MG/1
650 TABLET ORAL EVERY 4 HOURS PRN
Status: DISCONTINUED | OUTPATIENT
Start: 2021-01-11 | End: 2021-01-11 | Stop reason: HOSPADM

## 2021-01-08 RX ORDER — OXYCODONE HYDROCHLORIDE 10 MG/1
10 TABLET ORAL EVERY 4 HOURS PRN
Status: DISCONTINUED | OUTPATIENT
Start: 2021-01-08 | End: 2021-01-11 | Stop reason: HOSPADM

## 2021-01-08 RX ORDER — PROCHLORPERAZINE MALEATE 5 MG
5 TABLET ORAL EVERY 6 HOURS PRN
Status: DISCONTINUED | OUTPATIENT
Start: 2021-01-08 | End: 2021-01-11 | Stop reason: HOSPADM

## 2021-01-08 RX ORDER — SODIUM CHLORIDE, SODIUM LACTATE, POTASSIUM CHLORIDE, CALCIUM CHLORIDE 600; 310; 30; 20 MG/100ML; MG/100ML; MG/100ML; MG/100ML
INJECTION, SOLUTION INTRAVENOUS CONTINUOUS
Status: DISCONTINUED | OUTPATIENT
Start: 2021-01-08 | End: 2021-01-10

## 2021-01-08 RX ORDER — NALOXONE HYDROCHLORIDE 0.4 MG/ML
0.2 INJECTION, SOLUTION INTRAMUSCULAR; INTRAVENOUS; SUBCUTANEOUS
Status: ACTIVE | OUTPATIENT
Start: 2021-01-08 | End: 2021-01-09

## 2021-01-08 RX ORDER — HYDROMORPHONE HYDROCHLORIDE 1 MG/ML
0.2 INJECTION, SOLUTION INTRAMUSCULAR; INTRAVENOUS; SUBCUTANEOUS
Status: DISCONTINUED | OUTPATIENT
Start: 2021-01-08 | End: 2021-01-11 | Stop reason: HOSPADM

## 2021-01-08 RX ORDER — BUPIVACAINE HYDROCHLORIDE 2.5 MG/ML
INJECTION, SOLUTION EPIDURAL; INFILTRATION; INTRACAUDAL PRN
Status: DISCONTINUED | OUTPATIENT
Start: 2021-01-08 | End: 2021-01-08

## 2021-01-08 RX ORDER — ACETAMINOPHEN 325 MG/1
975 TABLET ORAL ONCE
Status: COMPLETED | OUTPATIENT
Start: 2021-01-08 | End: 2021-01-08

## 2021-01-08 RX ORDER — ONDANSETRON 4 MG/1
4 TABLET, ORALLY DISINTEGRATING ORAL EVERY 6 HOURS PRN
Status: DISCONTINUED | OUTPATIENT
Start: 2021-01-08 | End: 2021-01-11 | Stop reason: HOSPADM

## 2021-01-08 RX ORDER — ONDANSETRON 2 MG/ML
4 INJECTION INTRAMUSCULAR; INTRAVENOUS EVERY 6 HOURS PRN
Status: DISCONTINUED | OUTPATIENT
Start: 2021-01-08 | End: 2021-01-11 | Stop reason: HOSPADM

## 2021-01-08 RX ORDER — MAGNESIUM HYDROXIDE 1200 MG/15ML
LIQUID ORAL PRN
Status: DISCONTINUED | OUTPATIENT
Start: 2021-01-08 | End: 2021-01-08 | Stop reason: HOSPADM

## 2021-01-08 RX ORDER — AMOXICILLIN 250 MG
1 CAPSULE ORAL 2 TIMES DAILY
Status: DISCONTINUED | OUTPATIENT
Start: 2021-01-08 | End: 2021-01-11 | Stop reason: HOSPADM

## 2021-01-08 RX ORDER — FENTANYL CITRATE 50 UG/ML
INJECTION, SOLUTION INTRAMUSCULAR; INTRAVENOUS PRN
Status: DISCONTINUED | OUTPATIENT
Start: 2021-01-08 | End: 2021-01-08

## 2021-01-08 RX ORDER — ONDANSETRON 2 MG/ML
4 INJECTION INTRAMUSCULAR; INTRAVENOUS EVERY 30 MIN PRN
Status: DISCONTINUED | OUTPATIENT
Start: 2021-01-08 | End: 2021-01-08 | Stop reason: HOSPADM

## 2021-01-08 RX ORDER — IBUPROFEN 600 MG/1
600 TABLET, FILM COATED ORAL EVERY 6 HOURS PRN
Status: DISCONTINUED | OUTPATIENT
Start: 2021-01-08 | End: 2021-01-08

## 2021-01-08 RX ORDER — ONDANSETRON 2 MG/ML
INJECTION INTRAMUSCULAR; INTRAVENOUS PRN
Status: DISCONTINUED | OUTPATIENT
Start: 2021-01-08 | End: 2021-01-08

## 2021-01-08 RX ORDER — FENTANYL CITRATE-0.9 % NACL/PF 10 MCG/ML
PLASTIC BAG, INJECTION (ML) INTRAVENOUS CONTINUOUS PRN
Status: DISCONTINUED | OUTPATIENT
Start: 2021-01-08 | End: 2021-01-08

## 2021-01-08 RX ORDER — CEFAZOLIN SODIUM 1 G/3ML
1 INJECTION, POWDER, FOR SOLUTION INTRAMUSCULAR; INTRAVENOUS SEE ADMIN INSTRUCTIONS
Status: DISCONTINUED | OUTPATIENT
Start: 2021-01-08 | End: 2021-01-08 | Stop reason: HOSPADM

## 2021-01-08 RX ORDER — CELECOXIB 100 MG/1
100 CAPSULE ORAL 2 TIMES DAILY
Status: DISCONTINUED | OUTPATIENT
Start: 2021-01-09 | End: 2021-01-11 | Stop reason: HOSPADM

## 2021-01-08 RX ORDER — GABAPENTIN 100 MG/1
100 CAPSULE ORAL AT BEDTIME
Status: DISCONTINUED | OUTPATIENT
Start: 2021-01-08 | End: 2021-01-11 | Stop reason: HOSPADM

## 2021-01-08 RX ORDER — BISACODYL 10 MG
10 SUPPOSITORY, RECTAL RECTAL DAILY PRN
Status: DISCONTINUED | OUTPATIENT
Start: 2021-01-08 | End: 2021-01-11 | Stop reason: HOSPADM

## 2021-01-08 RX ORDER — CEFAZOLIN SODIUM 2 G/100ML
2 INJECTION, SOLUTION INTRAVENOUS
Status: COMPLETED | OUTPATIENT
Start: 2021-01-08 | End: 2021-01-08

## 2021-01-08 RX ORDER — LIDOCAINE 40 MG/G
CREAM TOPICAL
Status: DISCONTINUED | OUTPATIENT
Start: 2021-01-08 | End: 2021-01-11 | Stop reason: HOSPADM

## 2021-01-08 RX ORDER — NALOXONE HYDROCHLORIDE 0.4 MG/ML
0.4 INJECTION, SOLUTION INTRAMUSCULAR; INTRAVENOUS; SUBCUTANEOUS
Status: ACTIVE | OUTPATIENT
Start: 2021-01-08 | End: 2021-01-09

## 2021-01-08 RX ORDER — FENTANYL CITRATE 50 UG/ML
25-50 INJECTION, SOLUTION INTRAMUSCULAR; INTRAVENOUS
Status: DISCONTINUED | OUTPATIENT
Start: 2021-01-08 | End: 2021-01-08 | Stop reason: HOSPADM

## 2021-01-08 RX ORDER — DOCUSATE SODIUM 100 MG/1
100 CAPSULE, LIQUID FILLED ORAL 2 TIMES DAILY
Status: DISCONTINUED | OUTPATIENT
Start: 2021-01-08 | End: 2021-01-11 | Stop reason: HOSPADM

## 2021-01-08 RX ORDER — FLUMAZENIL 0.1 MG/ML
0.2 INJECTION, SOLUTION INTRAVENOUS
Status: DISCONTINUED | OUTPATIENT
Start: 2021-01-08 | End: 2021-01-08 | Stop reason: HOSPADM

## 2021-01-08 RX ORDER — SODIUM CHLORIDE, SODIUM LACTATE, POTASSIUM CHLORIDE, CALCIUM CHLORIDE 600; 310; 30; 20 MG/100ML; MG/100ML; MG/100ML; MG/100ML
INJECTION, SOLUTION INTRAVENOUS CONTINUOUS PRN
Status: DISCONTINUED | OUTPATIENT
Start: 2021-01-08 | End: 2021-01-08

## 2021-01-08 RX ORDER — NALOXONE HYDROCHLORIDE 0.4 MG/ML
0.2 INJECTION, SOLUTION INTRAMUSCULAR; INTRAVENOUS; SUBCUTANEOUS
Status: DISCONTINUED | OUTPATIENT
Start: 2021-01-08 | End: 2021-01-08 | Stop reason: HOSPADM

## 2021-01-08 RX ORDER — FENTANYL CITRATE-0.9 % NACL/PF 10 MCG/ML
PLASTIC BAG, INJECTION (ML) INTRAVENOUS PRN
Status: DISCONTINUED | OUTPATIENT
Start: 2021-01-08 | End: 2021-01-08

## 2021-01-08 RX ORDER — CELECOXIB 200 MG/1
400 CAPSULE ORAL ONCE
Status: COMPLETED | OUTPATIENT
Start: 2021-01-08 | End: 2021-01-08

## 2021-01-08 RX ORDER — ACETAMINOPHEN 325 MG/1
975 TABLET ORAL EVERY 8 HOURS
Status: DISPENSED | OUTPATIENT
Start: 2021-01-08 | End: 2021-01-11

## 2021-01-08 RX ORDER — NALOXONE HYDROCHLORIDE 0.4 MG/ML
0.4 INJECTION, SOLUTION INTRAMUSCULAR; INTRAVENOUS; SUBCUTANEOUS
Status: DISCONTINUED | OUTPATIENT
Start: 2021-01-08 | End: 2021-01-08 | Stop reason: HOSPADM

## 2021-01-08 RX ORDER — OXYCODONE HYDROCHLORIDE 5 MG/1
5 TABLET ORAL
Status: DISCONTINUED | OUTPATIENT
Start: 2021-01-08 | End: 2021-01-11 | Stop reason: HOSPADM

## 2021-01-08 RX ORDER — SODIUM CHLORIDE 9 MG/ML
INJECTION, SOLUTION INTRAVENOUS
Status: COMPLETED
Start: 2021-01-08 | End: 2021-01-08

## 2021-01-08 RX ORDER — OXYCODONE HYDROCHLORIDE 5 MG/1
5 TABLET ORAL EVERY 6 HOURS PRN
Qty: 30 TABLET | Refills: 0 | Status: SHIPPED | OUTPATIENT
Start: 2021-01-08 | End: 2021-01-11

## 2021-01-08 RX ORDER — ACETAMINOPHEN 325 MG/1
975 TABLET ORAL ONCE
Status: DISCONTINUED | OUTPATIENT
Start: 2021-01-08 | End: 2021-01-08 | Stop reason: HOSPADM

## 2021-01-08 RX ORDER — TRANEXAMIC ACID 650 MG/1
1950 TABLET ORAL ONCE
Status: COMPLETED | OUTPATIENT
Start: 2021-01-08 | End: 2021-01-08

## 2021-01-08 RX ORDER — BUPIVACAINE HYDROCHLORIDE AND EPINEPHRINE 5; 5 MG/ML; UG/ML
INJECTION, SOLUTION PERINEURAL PRN
Status: DISCONTINUED | OUTPATIENT
Start: 2021-01-08 | End: 2021-01-08 | Stop reason: HOSPADM

## 2021-01-08 RX ORDER — DEXAMETHASONE SODIUM PHOSPHATE 4 MG/ML
INJECTION, SOLUTION INTRA-ARTICULAR; INTRALESIONAL; INTRAMUSCULAR; INTRAVENOUS; SOFT TISSUE PRN
Status: DISCONTINUED | OUTPATIENT
Start: 2021-01-08 | End: 2021-01-08

## 2021-01-08 RX ORDER — CEFAZOLIN SODIUM 1 G/3ML
1 INJECTION, POWDER, FOR SOLUTION INTRAMUSCULAR; INTRAVENOUS EVERY 8 HOURS
Status: COMPLETED | OUTPATIENT
Start: 2021-01-08 | End: 2021-01-09

## 2021-01-08 RX ORDER — HYDROXYZINE HYDROCHLORIDE 25 MG/1
25 TABLET, FILM COATED ORAL EVERY 6 HOURS PRN
Status: DISCONTINUED | OUTPATIENT
Start: 2021-01-08 | End: 2021-01-11 | Stop reason: HOSPADM

## 2021-01-08 RX ORDER — LABETALOL HYDROCHLORIDE 5 MG/ML
5 INJECTION, SOLUTION INTRAVENOUS
Status: DISCONTINUED | OUTPATIENT
Start: 2021-01-08 | End: 2021-01-08 | Stop reason: HOSPADM

## 2021-01-08 RX ORDER — HYDROMORPHONE HYDROCHLORIDE 1 MG/ML
0.4 INJECTION, SOLUTION INTRAMUSCULAR; INTRAVENOUS; SUBCUTANEOUS
Status: DISCONTINUED | OUTPATIENT
Start: 2021-01-08 | End: 2021-01-11 | Stop reason: HOSPADM

## 2021-01-08 RX ORDER — LIDOCAINE HYDROCHLORIDE 20 MG/ML
INJECTION, SOLUTION INFILTRATION; PERINEURAL PRN
Status: DISCONTINUED | OUTPATIENT
Start: 2021-01-08 | End: 2021-01-08

## 2021-01-08 RX ORDER — FAMOTIDINE 20 MG/1
20 TABLET, FILM COATED ORAL 2 TIMES DAILY
Status: DISCONTINUED | OUTPATIENT
Start: 2021-01-08 | End: 2021-01-11 | Stop reason: HOSPADM

## 2021-01-08 RX ORDER — ONDANSETRON 4 MG/1
4 TABLET, ORALLY DISINTEGRATING ORAL EVERY 30 MIN PRN
Status: DISCONTINUED | OUTPATIENT
Start: 2021-01-08 | End: 2021-01-08 | Stop reason: HOSPADM

## 2021-01-08 RX ORDER — ACETAMINOPHEN 325 MG/1
650 TABLET ORAL ONCE
Status: DISCONTINUED | OUTPATIENT
Start: 2021-01-08 | End: 2021-01-08 | Stop reason: HOSPADM

## 2021-01-08 RX ORDER — DIPHENHYDRAMINE HYDROCHLORIDE 50 MG/ML
50 INJECTION INTRAMUSCULAR; INTRAVENOUS ONCE
Status: COMPLETED | OUTPATIENT
Start: 2021-01-08 | End: 2021-01-08

## 2021-01-08 RX ORDER — SODIUM CHLORIDE, SODIUM LACTATE, POTASSIUM CHLORIDE, CALCIUM CHLORIDE 600; 310; 30; 20 MG/100ML; MG/100ML; MG/100ML; MG/100ML
INJECTION, SOLUTION INTRAVENOUS CONTINUOUS
Status: DISCONTINUED | OUTPATIENT
Start: 2021-01-08 | End: 2021-01-08 | Stop reason: HOSPADM

## 2021-01-08 RX ORDER — PROPOFOL 10 MG/ML
INJECTION, EMULSION INTRAVENOUS PRN
Status: DISCONTINUED | OUTPATIENT
Start: 2021-01-08 | End: 2021-01-08

## 2021-01-08 RX ORDER — POLYETHYLENE GLYCOL 3350 17 G/17G
17 POWDER, FOR SOLUTION ORAL DAILY
Status: DISCONTINUED | OUTPATIENT
Start: 2021-01-09 | End: 2021-01-11 | Stop reason: HOSPADM

## 2021-01-08 RX ORDER — DEXAMETHASONE SODIUM PHOSPHATE 10 MG/ML
INJECTION, SOLUTION INTRAMUSCULAR; INTRAVENOUS PRN
Status: DISCONTINUED | OUTPATIENT
Start: 2021-01-08 | End: 2021-01-08

## 2021-01-08 RX ORDER — PROPOFOL 10 MG/ML
INJECTION, EMULSION INTRAVENOUS CONTINUOUS PRN
Status: DISCONTINUED | OUTPATIENT
Start: 2021-01-08 | End: 2021-01-08

## 2021-01-08 RX ADMIN — HYDROXYZINE HYDROCHLORIDE 25 MG: 25 TABLET, FILM COATED ORAL at 15:48

## 2021-01-08 RX ADMIN — DEXMEDETOMIDINE HYDROCHLORIDE 40 MCG: 100 INJECTION, SOLUTION INTRAVENOUS at 07:28

## 2021-01-08 RX ADMIN — ONDANSETRON 4 MG: 2 INJECTION INTRAMUSCULAR; INTRAVENOUS at 07:58

## 2021-01-08 RX ADMIN — SODIUM CHLORIDE 500 ML: 9 INJECTION, SOLUTION INTRAVENOUS at 23:44

## 2021-01-08 RX ADMIN — FAMOTIDINE 20 MG: 20 TABLET, FILM COATED ORAL at 20:29

## 2021-01-08 RX ADMIN — DIPHENHYDRAMINE HYDROCHLORIDE 50 MG: 50 INJECTION, SOLUTION INTRAMUSCULAR; INTRAVENOUS at 06:28

## 2021-01-08 RX ADMIN — FENTANYL CITRATE 100 MCG: 50 INJECTION, SOLUTION INTRAMUSCULAR; INTRAVENOUS at 07:42

## 2021-01-08 RX ADMIN — HYDROMORPHONE HYDROCHLORIDE 0.2 MG: 1 INJECTION, SOLUTION INTRAMUSCULAR; INTRAVENOUS; SUBCUTANEOUS at 09:49

## 2021-01-08 RX ADMIN — CELECOXIB 400 MG: 200 CAPSULE ORAL at 06:24

## 2021-01-08 RX ADMIN — DEXAMETHASONE SODIUM PHOSPHATE 4 MG: 4 INJECTION, SOLUTION INTRAMUSCULAR; INTRAVENOUS at 07:58

## 2021-01-08 RX ADMIN — OXYCODONE HYDROCHLORIDE 5 MG: 5 TABLET ORAL at 15:48

## 2021-01-08 RX ADMIN — SODIUM CHLORIDE, POTASSIUM CHLORIDE, SODIUM LACTATE AND CALCIUM CHLORIDE: 600; 310; 30; 20 INJECTION, SOLUTION INTRAVENOUS at 16:38

## 2021-01-08 RX ADMIN — PROPOFOL 70 MG: 10 INJECTION, EMULSION INTRAVENOUS at 07:42

## 2021-01-08 RX ADMIN — ACETAMINOPHEN 975 MG: 325 TABLET, FILM COATED ORAL at 12:50

## 2021-01-08 RX ADMIN — Medication 100 MCG: at 08:14

## 2021-01-08 RX ADMIN — Medication 0.2 MCG/MIN: at 08:02

## 2021-01-08 RX ADMIN — CEFAZOLIN 1 G: 1 INJECTION, POWDER, FOR SOLUTION INTRAMUSCULAR; INTRAVENOUS at 16:38

## 2021-01-08 RX ADMIN — CEFAZOLIN 2 G: 10 INJECTION, POWDER, FOR SOLUTION INTRAVENOUS at 07:47

## 2021-01-08 RX ADMIN — TRANEXAMIC ACID 1950 MG: 650 TABLET ORAL at 06:24

## 2021-01-08 RX ADMIN — ACETAMINOPHEN 975 MG: 325 TABLET, FILM COATED ORAL at 06:23

## 2021-01-08 RX ADMIN — SODIUM CHLORIDE, POTASSIUM CHLORIDE, SODIUM LACTATE AND CALCIUM CHLORIDE: 600; 310; 30; 20 INJECTION, SOLUTION INTRAVENOUS at 07:30

## 2021-01-08 RX ADMIN — MIDAZOLAM 1 MG: 1 INJECTION INTRAMUSCULAR; INTRAVENOUS at 07:42

## 2021-01-08 RX ADMIN — HYDROMORPHONE HYDROCHLORIDE 0.5 MG: 1 INJECTION, SOLUTION INTRAMUSCULAR; INTRAVENOUS; SUBCUTANEOUS at 07:42

## 2021-01-08 RX ADMIN — PROPOFOL 20 MCG/KG/MIN: 10 INJECTION, EMULSION INTRAVENOUS at 07:55

## 2021-01-08 RX ADMIN — OXYCODONE HYDROCHLORIDE 5 MG: 5 TABLET ORAL at 12:50

## 2021-01-08 RX ADMIN — BUPIVACAINE HYDROCHLORIDE 30 ML: 2.5 INJECTION, SOLUTION EPIDURAL; INFILTRATION; INTRACAUDAL at 07:28

## 2021-01-08 RX ADMIN — LIDOCAINE HYDROCHLORIDE 100 MG: 20 INJECTION, SOLUTION INFILTRATION; PERINEURAL at 07:42

## 2021-01-08 RX ADMIN — OXYCODONE HYDROCHLORIDE 5 MG: 5 TABLET ORAL at 23:39

## 2021-01-08 RX ADMIN — DEXAMETHASONE SODIUM PHOSPHATE 2 MG: 10 INJECTION, SOLUTION INTRAMUSCULAR; INTRAVENOUS at 07:28

## 2021-01-08 RX ADMIN — ACETAMINOPHEN 975 MG: 325 TABLET, FILM COATED ORAL at 20:29

## 2021-01-08 RX ADMIN — DOCUSATE SODIUM AND SENNOSIDES 1 TABLET: 8.6; 5 TABLET ORAL at 20:33

## 2021-01-08 RX ADMIN — OXYCODONE HYDROCHLORIDE 5 MG: 5 TABLET ORAL at 20:29

## 2021-01-08 RX ADMIN — Medication 100 MCG: at 07:58

## 2021-01-08 RX ADMIN — Medication 100 MCG: at 08:05

## 2021-01-08 RX ADMIN — GABAPENTIN 100 MG: 100 CAPSULE ORAL at 22:12

## 2021-01-08 RX ADMIN — HYDROMORPHONE HYDROCHLORIDE 0.3 MG: 1 INJECTION, SOLUTION INTRAMUSCULAR; INTRAVENOUS; SUBCUTANEOUS at 09:53

## 2021-01-08 RX ADMIN — FENTANYL CITRATE 25 MCG: 50 INJECTION INTRAMUSCULAR; INTRAVENOUS at 07:16

## 2021-01-08 RX ADMIN — FENTANYL CITRATE 25 MCG: 50 INJECTION INTRAMUSCULAR; INTRAVENOUS at 10:58

## 2021-01-08 RX ADMIN — SODIUM CHLORIDE, POTASSIUM CHLORIDE, SODIUM LACTATE AND CALCIUM CHLORIDE 250 ML: 600; 310; 30; 20 INJECTION, SOLUTION INTRAVENOUS at 10:30

## 2021-01-08 ASSESSMENT — ACTIVITIES OF DAILY LIVING (ADL)
PATIENT_/_FAMILY_COMMUNICATION_STYLE: SPOKEN LANGUAGE (ENGLISH OR BILINGUAL)
TOILETING_ISSUES: NO
WHICH_OF_THE_ABOVE_FUNCTIONAL_RISKS_HAD_A_RECENT_ONSET_OR_CHANGE?: AMBULATION
WALKING_OR_CLIMBING_STAIRS: AMBULATION DIFFICULTY, REQUIRES EQUIPMENT
WEAR_GLASSES_OR_BLIND: YES
CONCENTRATING,_REMEMBERING_OR_MAKING_DECISIONS_DIFFICULTY: NO
DOING_ERRANDS_INDEPENDENTLY_DIFFICULTY: NO
EQUIPMENT_CURRENTLY_USED_AT_HOME: CANE, STRAIGHT
DIFFICULTY_EATING/SWALLOWING: NO
HEARING_DIFFICULTY_OR_DEAF: NO
WALKING_OR_CLIMBING_STAIRS_DIFFICULTY: YES
DRESSING/BATHING_DIFFICULTY: NO
DIFFICULTY_COMMUNICATING: NO

## 2021-01-08 ASSESSMENT — MIFFLIN-ST. JEOR: SCORE: 1181

## 2021-01-08 NOTE — CONSULTS
Consult Date:  01/08/2021      INTERNAL MEDICINE CONSULTATION      ASSESSMENT:   1.  Status post left total knee arthroplasty.  The patient is doing well postoperatively.   2.  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  The patient did receive a unit of platelets with Benadryl this morning prior to surgery.   3.  No known cardiac or pulmonary disease.      RECOMMENDATIONS:  Routine postoperative labs are ordered.  Vistaril and oxycodone are ordered for pain.  The patient did receive 40 mg of Celebrex this morning.  Celebrex will be the best anti-inflammatory option as both ibuprofen and ketorolac may have significant platelet inhibition features.  DVT prophylaxis will be with Eliquis, which will be started 2 days postop per recommendations of Hematology.      Thank you for having me see this patient.      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 Rodriguez for the treatment of osteoarthritis of the knee.  I have been asked to see her by Dr. Rodriguez to assess medical concerns including platelet function defect.  Please see Dr. Rodriguez's notes in the charting for details regarding the patient's orthopedic history and indication for surgery.  The events of surgery are noted.  The patient did receive general anesthesia.  Estimated blood loss was 100 mL.  The patient's vital signs were stable throughout.      Maria Luisa was seen by me shortly after admission to the Orthopedic unit.  She notes excellent pain control at the current time.  She denies cough, chest pain, shortness of breath, nausea, vomiting, or abdominal pain.      PAST MEDICAL HISTORY:   1.  Osteoarthritis.   2.  History of platelet function defect with a history of significant oozing following previous orthopedic procedures.  With surgeries over the last several years, she has received a unit of platelets preoperatively with benefit.  She has had 1  episode of respiratory distress following the administration of platelets several years ago and therefore has been premedicated with diphenhydramine and given leuko-reduced platelets.  The patient was seen by Dr. Dotson of Hematology prior to surgery, who did recommend the use of Eliquis postoperatively for DVT prophylaxis in lieu of aspirin.      PAST MEDICAL HISTORY:  Remarkable for platelet function defect as noted above.      PAST SURGICAL HISTORY:  Multiple orthopedic procedures including lumbar spine surgery in , bilateral hammertoe repair , bilateral arthroscopic knee surgery.        PREOPERATIVE MEDICATIONS:  Include magnesium oxide for leg cramps, as well as multiple vitamins.      FAMILY HISTORY:  Reviewed by me and is unremarkable.      SOCIAL HISTORY:  The patient is .  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  She drinks alcohol rarely.  She denies cigarette use.      There is no history of known cardiac or pulmonary disease.  To my knowledge, she has never had a formal coronary artery evaluation.      PHYSICAL EXAMINATION:   GENERAL:  She is a pleasant, well-appearing female who appears quite comfortable.  She is alert and fully oriented.   VITAL SIGNS:  Stable.   HEENT:  Unremarkable.   NECK:  Supple.   LUNGS:  Clear.   CARDIAC:  Regular rate and rhythm without murmurs.   ABDOMEN:  Soft, nontender, nondistended.   EXTREMITIES:  Left leg is wrapped.  There is no calf edema.   NEUROLOGIC:  She is alert and fully oriented.  Cranial nerves are intact.      LABORATORY DATA:  Preoperative labs were reviewed.  CBC was normal.  BMP was normal.  There is no recent EKG available.         FREDI GORDON MD             D: 2021   T: 2021   MT:       Name:     ANTIONE JULIAN   MRN:      [id number]        Account:       [id number]   :      1950           Consult Date:  2021      Document: [id number]       cc: Walter Rodriguez MD

## 2021-01-08 NOTE — PROGRESS NOTES
Care Management Discharge Note    Discharge Date:         Discharge Disposition:  Home    Discharge Services:  Outpatient physical therapy  Discharge DME:      Discharge Transportation: family or friend will provide    Additional Information:  Outpatient physical therapy referral sent to [facility]. No further discharge concerns. RNCC available as needed.    [facility]  Phone [phone]  Fax [phone]    Emilie Fischer RN, BSN  Care Coordinator, [facility]  Phone [phone]  Pager [phone]

## 2021-01-08 NOTE — PROGRESS NOTES
01/08/21 1449   Quick Adds   Quick Adds Certification   Type of Visit Initial PT Evaluation   Living Environment   People in home friend(s)   Current Living Arrangements house   Home Accessibility stairs within home   Number of Stairs, Within Home, Primary 2   Stair Railings, Within Home, Primary none   Transportation Anticipated family or friend will provide   Self-Care   Usual Activity Tolerance good   Current Activity Tolerance moderate   Regular Exercise Yes   Activity/Exercise Type walking   Exercise Amount/Frequency daily   Equipment Currently Used at Home cane, straight   Disability/Function   Hearing Difficulty or Deaf no   Wear Glasses or Blind yes   Vision Management Glasses   Concentrating, Remembering or Making Decisions Difficulty no   Difficulty Communicating no   Difficulty Eating/Swallowing no   Walking or Climbing Stairs Difficulty no   Dressing/Bathing Difficulty no   Toileting issues no   Doing Errands Independently Difficulty (such as shopping) no   General Information   Onset of Illness/Injury or Date of Surgery 01/08/21   Referring Physician Dr TERA morgan MD   Patient/Family Therapy Goals Statement (PT) PLOF   Pertinent History of Current Problem (include personal factors and/or comorbidities that impact the POC) Pt is a 70 year old fem s/p left TKA surgery    Existing Precautions/Restrictions no known precautions/restrictions   Weight-Bearing Status - LLE weight-bearing as tolerated   Cognition   Orientation Status (Cognition) oriented x 4   Affect/Mental Status (Cognition) WNL   Follows Commands (Cognition) WNL   Pain Assessment   Patient Currently in Pain Yes, see Vital Sign flowsheet   Integumentary/Edema   Integumentary/Edema no deficits were identifed   Range of Motion (ROM)   ROM Comment 0-10-40   Strength   Strength Comments not formally assessed   Bed Mobility   Comment (Bed Mobility) SBA for bed mob   Transfers   Transfer Safety Comments SBA for sit<>stand with FWW   Gait/Stairs  (Locomotion)   [place] Level (Gait) supervision   Assistive Device (Gait) walker, front-wheeled   Distance in Feet (Required for LE Total Joints) 100'   Pattern (Gait) step-to   Comment (Gait/Stairs) stair not completed   Balance   Balance no deficits were identified   Sensory Examination   Sensory Perception Comments Pt reports numbness in foot during evaluation    Coordination   Coordination no deficits were identified   Muscle Tone   Muscle Tone Comments Pt was unable to dorsiflex during evaluation but this is attributed to block   Clinical Impression   Criteria for Skilled Therapeutic Intervention yes, treatment indicated   PT Diagnosis (PT) weakness   Influenced by the following impairments weakness    Functional limitations due to impairments weakness   Clinical Presentation Stable/Uncomplicated   Clinical Presentation Rationale Pt presents as medically diagnosed   Clinical Decision Making (Complexity) low complexity   Therapy Frequency (PT) 2x/day   Predicted Duration of Therapy Intervention (days/wks) 2   Planned Therapy Interventions (PT) bed mobility training;balance training;gait training;home exercise program;patient/family education;stair training;strengthening   Anticipated Equipment Needs at Discharge (PT)   (none)   Risk & Benefits of therapy have been explained care plan/treatment goals reviewed;risks/benefits reviewed   PT Discharge Planning    PT Discharge Recommendation (DC Rec) home with outpatient physical therapy   PT Rationale for DC Rec Pt has support and OP PT already scheduled   PT Brief overview of current status  SBA for mobility    Therapy Certification   Start of care date 01/08/21   Certification date from 01/08/21   Certification date to 01/10/21   Medical Diagnosis TKA   Total Evaluation Time   Total Evaluation Time (Minutes) 10

## 2021-01-08 NOTE — ANESTHESIA PROCEDURE NOTES
Airway   Date/Time: 1/8/2021 8:09 AM   Patient location during procedure: OR    Staff -   CRNA: Darling Ruano APRN CRNA  Performed By: CRNA    Consent for Airway   Urgency: elective    Indications and Patient Condition  Indications for airway management: gin-procedural  Induction type:intravenousMask difficulty assessment: 1 - vent by mask    Final Airway Details  Final airway type: supraglottic airway    Endotracheal Airway Details   Secured with: silk tape    Post intubation assessment   Placement verified by: capnometry, equal breath sounds and chest rise   Number of attempts at approach: 1  Secured with:silk tape  Ease of procedure: easy  Dentition: Intact and Unchanged

## 2021-01-08 NOTE — ANESTHESIA POSTPROCEDURE EVALUATION
Anesthesia POST Procedure Evaluation    Patient: Maria Luisa Garcia   MRN:     [id number] Gender:   female   Age:    70 year old :      1950        Preoperative Diagnosis: Tricompartment osteoarthritis of both knees [M17.0]   Procedure(s):  Left total knee arthroplasty   Postop Comments: No value filed.     Anesthesia Type: General          Postop Pain Control: Uneventful            Sign Out: Well controlled pain   PONV: No   Neuro/Psych: Uneventful            Sign Out: Acceptable/Baseline neuro status   Airway/Respiratory: Uneventful            Sign Out: Acceptable/Baseline resp. status   CV/Hemodynamics: Uneventful            Sign Out: Acceptable CV status   Other NRE: NONE   DID A NON-ROUTINE EVENT OCCUR? No         Last Anesthesia Record Vitals:  CRNA VITALS  2021 0938 - 2021 1033      2021             Resp Rate (observed):  (!) 1          Last PACU Vitals:  Vitals Value Taken Time   BP 98/49 21 1030   Temp 36.7  C (98.1  F) 21 1010   Pulse 72 21 1032   Resp 11 21 1032   SpO2 100 % 21 1032   Temp src     NIBP     Pulse     SpO2     Resp     Temp     Ht Rate     Temp 2     Vitals shown include unvalidated device data.      Electronically Signed By: Luc Marinelli DO, 2021, 10:33 AM

## 2021-01-08 NOTE — ANESTHESIA CARE TRANSFER NOTE
Patient: Maria Luisa Garcia    Procedure(s):  Left total knee arthroplasty    Diagnosis: Tricompartment osteoarthritis of both knees [M17.0]  Diagnosis Additional Information: No value filed.    Anesthesia Type:   General     Note:  Airway :Face Mask  Patient transferred to:PACU  Handoff Report: Identifed the Patient, Identified the Reponsible Provider, Reviewed the pertinent medical history, Discussed the surgical course, Reviewed Intra-OP anesthesia mangement and issues during anesthesia, Set expectations for post-procedure period and Allowed opportunity for questions and acknowledgement of understanding      Vitals: (Last set prior to Anesthesia Care Transfer)    CRNA VITALS  1/8/2021 0938 - 1/8/2021 1017      1/8/2021             Resp Rate (observed):  (!) 1                Electronically Signed By: RAMON Yost CRNA  January 8, 2021  10:17 AM

## 2021-01-08 NOTE — ANESTHESIA PROCEDURE NOTES
Peripheral Nerve Block Procedure Note      Staff -   Anesthesiologist:  Ramon Martin MD  Performed By: anesthesiologist  Location: Pre-op  Procedure Start/Stop TImes:      1/8/2021 7:15 AM    patient identified, IV checked, site marked, risks and benefits discussed, informed consent, monitors and equipment checked, pre-op evaluation, at physician/surgeon's request and post-op pain management      Correct Patient: Yes      Correct Position: Yes      Correct Site: Yes      Correct Procedure: Yes      Correct Laterality:  Yes    Site Marked:  Yes  Procedure details:     Procedure:  Adductor canal and Posterior capsule    ASA:  2    Diagnosis:  Left knee OA    Laterality:  Left    Position:  Supine    Sterile Prep: chloraprep      Local skin infiltration:  1% lidocaine    amount (mL):  5    Needle gauge:  21    Needle length (inches):  4    Ultrasound: Yes      Ultrasound used to identify targeted nerve, plexus, or vascular structure and placed a needle adjacent to it      Permanent Image entered into patiient's record      Abnormal pain on injection: No      Blood Aspirated: No      Paresthesias:  No    Bleeding at site: No      Bolus via:  Needle    Infusion Method:  Single Shot    Complications:  None  Assessment/Narrative:      Adductor: 13 ml  Periarterial popliteal artery injection 17 ml  No complications

## 2021-01-08 NOTE — BRIEF OP NOTE
[facility]     Brief Operative Note    Pre-operative diagnosis: End stage OA LEFT KNEE  Post-operative diagnosis End stage OA LEFT KNEE    Procedure: Procedure(s):  Left total knee arthroplasty  Surgeon: Surgeon(s) and Role:     * Walter Rodriguez MD - Primary     * Tanner Diaz MD - Fellow - Assisting  Anesthesia: General   Estimated blood loss: Less than 100 ml  Drains: Hemovac  Specimens: * No specimens in log *  Findings:   None.  Complications: None.  Implants:   Implant Name Type Inv. Item Serial No.  Lot No. LRB No. Used Action   BONE CEMENT SIMPLEX W/TOBRAMYCIN 6197-9-001 Cement, Bone BONE CEMENT SIMPLEX W/TOBRAMYCIN 6197-9-001  [employer] [id number] Left 1 Implanted   IMP COMP FEM STRK TRIATHLN CR LT 4 5510-F-401 Total Joint Component/Insert IMP COMP FEM STRK TRIATHLN CR LT 4 5510-F-401  [employer] [id number] Left 1 Implanted   IMP INSERT BASEPLATE TIBIAL HOWM TRI 5 5521-B-500 Total Joint Component/Insert IMP INSERT BASEPLATE TIBIAL HOWM TRI 5 5521-B-500  [employer] [id number] Left 1 Implanted   IMP COMP PATELLA HOWM ASYM TRI [id number] 5551-L-320 Total Joint Component/Insert IMP COMP PATELLA HOWM ASYM TRI [id number] 5551-L-320  [employer] [id number] Left 1 Implanted   [employer] Triathlon Tibial Bearing Insert CS Juan 5 Thkns 9mm Total Joint Component/Insert   [employer] [id number] Left 1 Implanted     Plan: Ortho Primary  Monitoring: Per unit protocol  Activity: Up with assist.  Weight bearing status: WBAT  Range of motion: As tolerated - NO CPM UNTIL POD7  Antibiotics: Ancef x24 hours perioperatively.  Diet: Begin with clear fluids and progress diet as tolerated.  DVT prophylaxis: Mechanical + ELIQUIS (POST OP DAY 2)  Bracing/Splinting: None  Elevation: Elevate operative extremity to keep at level of heart as much as possible.  Wound Care: Keep dressings in place until POD2  Drain: Hemavac  Pain management: PO pain  control  X-rays: PACU  Physical Therapy: ROM, ADL's.  Labs: Trend Hgb on POD #1.  Consults: PT + Med Mng  Follow-up: Clinic with Dr. Rodriguez (Angeline KENNEDY) in 2 weeks    Disposition: Pending progress with therapies, pain control on orals, and medical stability, anticipate discharge to home on POD #1-2

## 2021-01-08 NOTE — PLAN OF CARE
Patient vital signs are at baseline: Yes  Patient able to ambulate as they were prior to admission or with assist devices provided by therapies during their stay:  Yes  Patient MUST void prior to discharge:  Yes  Patient able to tolerate oral intake:  Yes  Pain has adequate pain control using Oral analgesics:  Yes    Patient  arrived at 1225 PM, patient A&O x4, lungs sound clear, Bowel sound active- denied passing gas and last BM was 1/7/21., Denied CP, lightheadedness, dizziness, numbness,  and SOB. Tingling in toes,  iv fluid infusing, drinking well and will scan later if patient is unable to  void. able to wiggle toes slightly, dressing on left knee clean,dry and intact. pain tolerable and taking 5 mg of oxycodone every 3 hours for pain, ice pack applied to knee, will remove ace wrap later to  inspect skin. Instructed patient on call system and  incentive spirometer, Capnography on with 2 liters of oxygen.  heels elevated off bed, demonstrates the ability to use call light appropriately, will continue to monitor patient.

## 2021-01-08 NOTE — OR NURSING
PACU to Inpatient Nursing Handoff    Patient Maria Luisa Garcia is a 70 year old female who speaks English.   Procedure Procedure(s):  Left total knee arthroplasty   Surgeon(s) Primary: Walter Rodriguez MD  Fellow - Assisting: Tanner Diaz MD     Allergies   Allergen Reactions     Cleocin Anaphylaxis     Codeine Nausea and Vomiting       Isolation  No active isolations     Past Medical History   has a past medical history of Platelet function defect (H).    Anesthesia General   Dermatome Level     Preop Meds acetaminophen (Tylenol) - time given: 0623  celecoxib (Celebrex) - time given: 0624  benadryl 50mg - time given: 0628 (before platelets)   Nerve block Adductor canal.  Location:left. Med:bupivacaine and decadron and precedex. Time given: 0728  Posterior capsule .  Location:left. Med:bupivacaine and decadron and precedex. Time given: 0728   Intraop Meds dexamethasone (Decadron)  fentanyl (Sublimaze): 100 mcg total  hydromorphone (Dilaudid): 1 mg total  ondansetron (Zofran): last given at 0758   Local Meds Yes - Local Cocktail (morphine, ropivacaine, epinephrine, Toradol)   Antibiotics cefazolin (Ancef) - last given at 0747     Pain Patient Currently in Pain: yes   PACU meds  fentanyl (Sublimaze): 25 mcg (total dose) last given at 1058   lactated ringers 250ml bolus for low BP   PCA / epidural No   Capnography Respiratory Monitoring (EtCO2): 41 mmHg IPI 8-9   Telemetry ECG Rhythm: Normal sinus rhythm   Inpatient Telemetry Monitor Ordered? No        Labs Glucose Lab Results   Component Value Date    GLC 96 12/18/2020       Hgb Lab Results   Component Value Date    HGB 14.9 12/18/2020       INR No results found for: INR   PACU Imaging Completed     Wound/Incision Incision/Surgical Site 12/10/14 Bilateral Foot (Active)   Number of days: 2221       Incision/Surgical Site 01/08/21 Left Knee (Active)   Incision Assessment UTV 01/08/21 1123   Lauren-Incision Assessment UTV 01/08/21 1123   Closure NAV 01/08/21 1123    Incision Drainage Amount None 01/08/21 1123   Dressing Intervention Clean, dry, intact 01/08/21 1123   Number of days: 0      CMS        Equipment ice pack   Other LDA       IV Access Peripheral IV 01/08/21 Right;Dorsal Hand (Active)   Site Assessment WDL 01/08/21 1112   Line Status Infusing 01/08/21 1112   Phlebitis Scale 0-->no symptoms 01/08/21 1112   Number of days: 0      Blood Products Platelets:  1 unit ordered, 1 unit given EBL 25 mL   Intake/Output Date 01/08/21 0700 - 01/09/21 0659   Shift 4658-9710 5166-6014 2364-3641 24 Hour Total   INTAKE   P.O. 100   100   I.V. 800   800   IV Piggyback 250   250   Blood Components 213   213   Shift Total(mL/kg) 1363(19.42)   1363(19.42)   OUTPUT   Drains 80   80   Blood 25   25   Shift Total(mL/kg) 105(1.5)   105(1.5)   Weight (kg) 70.2 70.2 70.2 70.2      Drains / [place] Closed/Suction Drain Left Knee Accordion 10 [place] (Active)   Site Description UTV 01/08/21 1112   Dressing Status Normal: Clean, Dry & Intact 01/08/21 1112   Status To bulb suction 01/08/21 1112   Output (ml) 80 ml 01/08/21 1112   Number of days: 0      Time of void PreOp Void Prior to Procedure: 0640 (01/08/21 0720)    PostOp      Diapered? No   Bladder Scan Bladder Scan Volume (mL): 291 ml (01/08/21 1112)    mL (01/08/21 1112)  water     Vitals    B/P: 94/58  T: 98.1  F (36.7  C)    Temp src: Axillary  P:  Pulse: 74 (01/08/21 1115)          R: 15  O2:  SpO2: 95 %    O2 Device: None (Room air) (01/08/21 1115)    Oxygen Delivery: 2 LPM (01/08/21 1045)         Family/support present son Bc updated   Patient belongings  2 bags, clothes, jacket, watch and ID, cane, cell phone   Patient transported on bed   DC meds/scripts (obs/outpt) Not applicable   Inpatient Pain Meds Released? Yes       Special needs/considerations None   Tasks needing completion None       Magy Osei, RN  ASCOM [id number]

## 2021-01-08 NOTE — PLAN OF CARE
[facility]      OUTPATIENT PHYSICAL THERAPY EVALUATION  PLAN OF TREATMENT FOR OUTPATIENT REHABILITATION  (COMPLETE FOR INITIAL CLAIMS ONLY)  Patient's Last Name, First Name, M.I.  YOB: 1950  PersonMaria Luisa                        Provider's Name  [facility] Medical Record No.  [id number]                               Onset Date:  01/08/21   Start of Care Date:  01/08/21      Type:     _X_PT   ___OT   ___SLP Medical Diagnosis:  TKA                        PT Diagnosis:  weakness   Visits from SOC:  1   _________________________________________________________________________________  Plan of Treatment/Functional Goals    Planned Interventions: bed mobility training, balance training, gait training, home exercise program, patient/family education, stair training, strengthening     Goals: See Physical Therapy Goals on Care Plan in [employer] electronic health record.    Therapy Frequency: 2x/day  Predicted Duration of Therapy Intervention: 2  _________________________________________________________________________________    I CERTIFY THE NEED FOR THESE SERVICES FURNISHED UNDER        THIS PLAN OF TREATMENT AND WHILE UNDER MY CARE     (Physician co-signature of this document indicates review and certification of the therapy plan).              Certification date from: 01/08/21, Certification date to: 01/10/21    Referring Physician: Dr TERA morgan MD            Initial Assessment        See Physical Therapy evaluation dated 01/08/21 in Epic electronic health record.

## 2021-01-09 ENCOUNTER — APPOINTMENT (OUTPATIENT)
Dept: PHYSICAL THERAPY | Facility: CLINIC | Age: 71
DRG: 983 | End: 2021-01-09
Attending: ORTHOPAEDIC SURGERY
Payer: COMMERCIAL

## 2021-01-09 LAB
ANION GAP SERPL CALCULATED.3IONS-SCNC: 3 MMOL/L (ref 3–14)
BUN SERPL-MCNC: 14 MG/DL (ref 7–30)
CALCIUM SERPL-MCNC: 8.6 MG/DL (ref 8.5–10.1)
CHLORIDE SERPL-SCNC: 105 MMOL/L (ref 94–109)
CO2 SERPL-SCNC: 28 MMOL/L (ref 20–32)
CREAT SERPL-MCNC: 0.72 MG/DL (ref 0.52–1.04)
GFR SERPL CREATININE-BSD FRML MDRD: 83 ML/MIN/{1.73_M2}
GLUCOSE SERPL-MCNC: 100 MG/DL (ref 70–99)
HGB BLD-MCNC: 13.4 G/DL (ref 11.7–15.7)
POTASSIUM SERPL-SCNC: 4.5 MMOL/L (ref 3.4–5.3)
SODIUM SERPL-SCNC: 136 MMOL/L (ref 133–144)

## 2021-01-09 PROCEDURE — 250N000013 HC RX MED GY IP 250 OP 250 PS 637: Performed by: STUDENT IN AN ORGANIZED HEALTH CARE EDUCATION/TRAINING PROGRAM

## 2021-01-09 PROCEDURE — 36415 COLL VENOUS BLD VENIPUNCTURE: CPT | Performed by: STUDENT IN AN ORGANIZED HEALTH CARE EDUCATION/TRAINING PROGRAM

## 2021-01-09 PROCEDURE — 80048 BASIC METABOLIC PNL TOTAL CA: CPT | Performed by: STUDENT IN AN ORGANIZED HEALTH CARE EDUCATION/TRAINING PROGRAM

## 2021-01-09 PROCEDURE — 97110 THERAPEUTIC EXERCISES: CPT | Mod: GP | Performed by: CLINIC/CENTER

## 2021-01-09 PROCEDURE — 97116 GAIT TRAINING THERAPY: CPT | Mod: GP | Performed by: CLINIC/CENTER

## 2021-01-09 PROCEDURE — 97165 OT EVAL LOW COMPLEX 30 MIN: CPT | Mod: GO | Performed by: OCCUPATIONAL THERAPIST

## 2021-01-09 PROCEDURE — 250N000013 HC RX MED GY IP 250 OP 250 PS 637: Performed by: INTERNAL MEDICINE

## 2021-01-09 PROCEDURE — 85018 HEMOGLOBIN: CPT | Performed by: STUDENT IN AN ORGANIZED HEALTH CARE EDUCATION/TRAINING PROGRAM

## 2021-01-09 PROCEDURE — 250N000011 HC RX IP 250 OP 636: Performed by: STUDENT IN AN ORGANIZED HEALTH CARE EDUCATION/TRAINING PROGRAM

## 2021-01-09 RX ADMIN — FAMOTIDINE 20 MG: 20 TABLET, FILM COATED ORAL at 20:51

## 2021-01-09 RX ADMIN — POLYETHYLENE GLYCOL 3350 17 G: 17 POWDER, FOR SOLUTION ORAL at 08:05

## 2021-01-09 RX ADMIN — ACETAMINOPHEN 975 MG: 325 TABLET, FILM COATED ORAL at 12:04

## 2021-01-09 RX ADMIN — DOCUSATE SODIUM 100 MG: 100 CAPSULE, LIQUID FILLED ORAL at 20:53

## 2021-01-09 RX ADMIN — CELECOXIB 100 MG: 100 CAPSULE ORAL at 20:53

## 2021-01-09 RX ADMIN — ACETAMINOPHEN 975 MG: 325 TABLET, FILM COATED ORAL at 05:57

## 2021-01-09 RX ADMIN — DOCUSATE SODIUM AND SENNOSIDES 1 TABLET: 8.6; 5 TABLET ORAL at 20:51

## 2021-01-09 RX ADMIN — HYDROXYZINE HYDROCHLORIDE 25 MG: 25 TABLET, FILM COATED ORAL at 22:33

## 2021-01-09 RX ADMIN — CELECOXIB 100 MG: 100 CAPSULE ORAL at 08:04

## 2021-01-09 RX ADMIN — DOCUSATE SODIUM AND SENNOSIDES 1 TABLET: 8.6; 5 TABLET ORAL at 08:04

## 2021-01-09 RX ADMIN — ACETAMINOPHEN 975 MG: 325 TABLET, FILM COATED ORAL at 20:52

## 2021-01-09 RX ADMIN — HYDROXYZINE HYDROCHLORIDE 25 MG: 25 TABLET, FILM COATED ORAL at 13:18

## 2021-01-09 RX ADMIN — FAMOTIDINE 20 MG: 20 TABLET, FILM COATED ORAL at 08:02

## 2021-01-09 RX ADMIN — OXYCODONE HYDROCHLORIDE 5 MG: 5 TABLET ORAL at 12:03

## 2021-01-09 RX ADMIN — CEFAZOLIN 1 G: 1 INJECTION, POWDER, FOR SOLUTION INTRAMUSCULAR; INTRAVENOUS at 00:40

## 2021-01-09 RX ADMIN — OXYCODONE HYDROCHLORIDE 5 MG: 5 TABLET ORAL at 17:53

## 2021-01-09 RX ADMIN — DOCUSATE SODIUM 100 MG: 100 CAPSULE, LIQUID FILLED ORAL at 08:04

## 2021-01-09 RX ADMIN — OXYCODONE HYDROCHLORIDE 5 MG: 5 TABLET ORAL at 22:33

## 2021-01-09 RX ADMIN — OXYCODONE HYDROCHLORIDE 5 MG: 5 TABLET ORAL at 03:18

## 2021-01-09 RX ADMIN — OXYCODONE HYDROCHLORIDE 5 MG: 5 TABLET ORAL at 07:59

## 2021-01-09 NOTE — PROGRESS NOTES
Pt feels well  Pain controlled  Sleepy from vistaril, but would like to continue at current dose    VSS  Alert, fully oriented  Lungs clear  CV rrr  Abd soft  L LE wrapped      Results for PERSONANTIOEN (MRN [id number]) as of 1/9/2021 09:37   Ref. Range 1/9/2021 06:30   Sodium Latest Ref Range: 133 - 144 mmol/L 136   Potassium Latest Ref Range: 3.4 - 5.3 mmol/L 4.5   Chloride Latest Ref Range: 94 - 109 mmol/L 105   Carbon Dioxide Latest Ref Range: 20 - 32 mmol/L 28   Urea Nitrogen Latest Ref Range: 7 - 30 mg/dL 14   Creatinine Latest Ref Range: 0.52 - 1.04 mg/dL 0.72   GFR Estimate Latest Ref Range: >60 mL/min/1.73_m2 83   GFR Estimate If Black Latest Ref Range: >60 mL/min/1.73_m2 >90   Calcium Latest Ref Range: 8.5 - 10.1 mg/dL 8.6   Anion Gap Latest Ref Range: 3 - 14 mmol/L 3   Glucose Latest Ref Range: 70 - 99 mg/dL 100 (H)   Hemoglobin Latest Ref Range: 11.7 - 15.7 g/dL 13.4       Assessment    Sp L TKA, Pt is doing well    History of platelet function defect with post op bleeding.  Appears stable.  Pt received 1 unit platelets pre op    Plan  Continue current tx  Possible discharge tomorrow  Start Eliquis for DVT proph tomorrow

## 2021-01-09 NOTE — PROGRESS NOTES
Patient voided twice and scanned for 84. Up and ambulating to bathroom. Currently on room air- O2 sat at 96-97%.

## 2021-01-09 NOTE — PLAN OF CARE
.Patient A&O x4, lungs sound clear, Bowel sound active- passing gas, Denied CP, lightheadedness, dizziness, numbness, and SOB. iv saline locked, drinking well and voiding spontaneously without difficulties, able to wiggle toes, dressing clean, dry and intact. Hemovac intact. CMS intact, pain tolerable and taking oxycodone for pain, ice pack applied to knee,  incentive spirometer encouraged and done several times. Up using walker to ambulate. heels elevated off bed, demonstrates the ability to use call light appropriately, will continue to monitor patient.

## 2021-01-09 NOTE — PLAN OF CARE
Pt A&O x's 4. VSS. Afebrile. 02 sats in the 90s on RA. Lungs clear. Denies SOB, CP and nausea. Tolerating regular diet. Bowel sound active in all quadrants. No BM but passing gas. Voiding adequately into the  toilet. Pain well managed with current pain regimen. CMS intact, denies N/T except  tingling of the left leg from the block. PIV patent and SL between. Pt slept between care and is able to make needs known, call light with in reach. Will continue to monitor.     Patient vital signs are at baseline, yes  ~ Patient able to ambulate as they were prior to admission or with assist devices provided by therapies during their stay. yes  ~ Patient MUST void prior to discharge, yes  ~ Patient able to tolerate oral intake to maintain hydration status, yes  ~ Patient has adequate pain control using Oral analgesics, yes  ~ Hypercapnia, hypoventilation or hypoxia resolved for at least 2 hours without supplemental oxygen, on RA  ~ Deficits in sensation, mobility or coordination have resolved if spinal or regional anesthesia was used, slight tingling from the nerve block   ~ Patient has returned to baseline mental status, yes

## 2021-01-09 NOTE — PLAN OF CARE
OT:  OT screen only completed.  Patient demonstrated independence with bed mobility, donning/doffing L sock, ambulation to/from bathroom with walker and toilet transfer.  Patient plans to discharge home Sunday with friend staying for one week.  Patient has all needed AE for home.  OT orders completed.

## 2021-01-09 NOTE — PLAN OF CARE
[place] note [id number]  VS: VSS.   O2: >90% on RA. Capnography stable.   Output: Voiding without difficulty in bathroom.   Last BM: LBM 1/7 and passing gas.   Activity: Up with SBA. WBAT. Uses walker and gait belt. Pt walked 150 feet this shift.    Skin: Intact.   Pain: Minimal pain and managed with prn oxycodone and schedule tylenol.    CMS: Intact except tingling sensation on LLE due to nerve block.   Dressing: Left knee surgical dressing CDI. Ice applied.   Diet: Tolerating regular diet.   LDA: PIV SL into right forearm.   Equipment: Walker, gait belt, bedside commode and patient personal belongings.   Plan: TBD. Will continue to monitor.   Additional Info:    Observation Goal     2000  ~ Patient vital signs are at baseline, yes   ~ Patient able to ambulate as they were prior to admission or with assist devices provided by therapies during their stay. yes   ~ Patient MUST void prior to discharge, yes   ~ Patient able to tolerate oral intake to maintain hydration status, yes   ~ Patient has adequate pain control using Oral analgesics, yes   ~ Hypercapnia, hypoventilation or hypoxia resolved for at least 2 hours without supplemental oxygen, yes   ~ Deficits in sensation, mobility or coordination have resolved if spinal or regional anesthesia was used. Pt has some tingling sensation on LLE due to nerve block.  ~ Patient has returned to baseline mental status, yes     2200  ~ Patient vital signs are at baseline, yes   ~ Patient able to ambulate as they were prior to admission or with assist devices provided by therapies during their stay. yes   ~ Patient MUST void prior to discharge, yes   ~ Patient able to tolerate oral intake to maintain hydration status, yes   ~ Patient has adequate pain control using Oral analgesics, yes   ~ Hypercapnia, hypoventilation or hypoxia resolved for at least 2 hours without supplemental oxygen, yes   ~ Deficits in sensation, mobility or coordination have resolved if spinal or regional  anesthesia was used. Pt has some tingling sensation on LLE due to nerve block.  ~ Patient has returned to baseline mental status, yes

## 2021-01-09 NOTE — PLAN OF CARE
VS: VSS.  /69, pulse 68, temp 97.1, RR 16.   O2: 98% RA, EtCO2 40, IPI 10.   Output: Voiding without difficulty in bathroom.    Last BM: 1/7. Flatus +. Bowel medications given. No BM this shift.   Activity: Pt up with SBA. WBAT. Pt uses walker. Pt had OT this morning and is scheduled to have PT at 15:00. Pt has been up walking the hallway 600 feet this shift.   Skin: Intact.   Pain: Managed with PRN oxycodone and scheduled Tylenol. Pt also requested and received PRN Atarax.   Neuro/CMS: A&O x4. CMS intact except tingling sensation in LLE d/t nerve block.   Dressing: CDI.   Diet: Regular.   LDA: PIV SL in R forearm. New bag attached this shift. Hemovac output: 40 mL.   Equipment: Walker, gait belt, IS, personal belongings.   Plan: Will continue to monitor. Possible discharge tomorrow.   Additional Info: Per Dr. Rodriguez's verbal order, implemented excessive drainage protocol. ACE wrap roll placed under L knee and ACE wrapped in place at 12:45. Left in place for 30 minutes.

## 2021-01-09 NOTE — PROGRESS NOTES
"Orthopedic Surgery Progress Note    Subjective / Interval Events:   Patient resting in bed; no acute distress; pain controlled.  Denies chest pain, palpitations, SOB.  Pain well controlled on current regimen.      Patient had questions involving her postoperative plan, which were all answered to her satisfaction.    Objective:   Vital Signs Temp (24hrs), Av.1  F (36.2  C), Min:95.9  F (35.5  C), Max:98.2  F (36.8  C)    /53 (BP Location: Left arm)   Pulse 73   Temp 98.2  F (36.8  C) (Oral)   Resp 12   Ht 1.6 m (5' 2.99\")   Wt 70.2 kg (154 lb 12.2 oz)   SpO2 96%   BMI 27.42 kg/m        Physical Examination   General: no acute distress  CV: palpable pulses  Resp: Nonlabored breathing  Left LE: dressing in tact; clean and dry      5/5 Iliopsoas, quadricep, tibialis anterior, extensor houses longus, gastrocsoleus.     Sensation to light touch intact in the L2-S1 dermatome distribution.    Dorsalis pedis pulse palpated to be 2/4.    Tolerates range of motion in all major joints     No significant pain or limitation of range of motion.     Output by Drain (mL) 21 0700 - 21 1459 21 1500 - 21 2259 21 2300 - 21 0659 21 0700 - 21 1459 21 1500 - 21 2259 21 2300 - 21 0659 21 0700 - 21 0801   Closed/Suction Drain Left Knee Accordion 10 [place] citizen    80  170        Labs (Last 24 hour):   Lab Results   Component Value Date    WBC 6.3 2020    HGB 13.4 2021    HCT 47.1 2020    MCV 94 2020     2020    RDW 13.0 2020     Lab Results   Component Value Date    BUN 14 2021     2021    CO2 28 2021    ANIONGAP 3 2021       All cultures:  No results for input(s): CULT in the last 168 hours.    Assessment / Plan:   70 year old female s/p left total knee on 2021 by Dr. Rodriguez    Plan: Ortho Primary  Monitoring: Per unit protocol  Activity: Up with assist.  Weight bearing " status: WBAT  Range of motion: As tolerated - NO CPM UNTIL POD7  Antibiotics: Ancef x24 hours perioperatively.  Diet: Begin with clear fluids and progress diet as tolerated.  DVT prophylaxis: Mechanical + ELIQUIS (POST OP DAY 2)  Bracing/Splinting: None  Elevation: Elevate operative extremity to keep at level of heart as much as possible.  Wound Care: Keep dressings in place until POD2  Drain: Hemavac, follow-up output tomorrow  Pain management: PO pain control  X-rays: PACU  Physical Therapy: ROM, ADL's.  Labs: Trend Hgb on POD #1.  Consults: PT + Med Mng  Follow-up: Clinic with Dr. Rodriguez (Angeline KENNEDY) in 2 weeks    Tanner Diaz DO  Adult Joint Reconstruction Fellow  Dept Orthopaedic Surgery, [facility]  [phone] Pager [phone] Office

## 2021-01-09 NOTE — OP NOTE
Procedure Date: 01/08/2021      PREOPERATIVE DIAGNOSIS:   Tricompartment osteoarthritis, left knee.      POSTOPERATIVE DIAGNOSIS:  Tricompartment osteoarthritis, left knee.      PROCEDURE PERFORMED:  Left total knee arthroplasty.      SURGEON:  Walter Rodriguez MD      FIRST ASSISTANT:  Roberto Carlos Nguyen DO      INDICATION FOR SURGERY:  Bambi has end-stage osteoarthritis with a previous ACL reconstruction.  I will fail.  She has severe deformity and symptoms refractory to conservative care.  Risks and options were discussed.  She understood and wished to proceed.      DESCRIPTION OF PROCEDURE:  Under general anesthesia in the supine position, the left knee was prepped and draped in the usual sterile fashion.  Pause for the cause occurred and all present were in agreement.  The limb was exsanguinated and the tourniquet inflated to 300 mmHg for 90 minutes.  We used a 15 cm longitudinal anteromedial incision incorporating.  The prior ACL incision anteromedially.  We exposed the [place] screw and removed it with an axe dried.  We changed gloves and proceeded with a medial parapatellar arthrotomy and found extensive degenerative changes throughout the joint.  We everted the patella, clamped and held it in place and measured its thickness and then resected the appropriate amount for an asymmetric 32 mm patella button.  Patella with the metal plate.  We exposed the joint really anterior horns of the medial and lateral menisci.  I then entered the femur with the drill and the femoral instrumentation guide.  We resected the distal femur with 3 degrees of flexion and 9 mm on each side.  We prepared the tibia with the external alignment guide, resecting 2 mm medially and 9 mm laterally followed by completion of the 4-in-1 cuts of the femur, sizing the femur for a 4 tibia for 5.  We resected the medial and lateral menisci and injected the posterior capsule both sides.  We removed osteophytes medially and posteriorly.  We  trialled a [employer] components, femur and tibia with a 9 mm insert and found it necessary to do a minimal superficial anterior MCL release.  We had full extension and full flexion at the completion of the case.  Good stability throughout and good patellar tracking was noted.  We irrigated copiously with pulse lavage and then dried the surfaces and used a single batch of tobramycin impregnated cement to cement the tibia, femur and patella in that sequence, holding all surfaces in compression until polymerization was complete.  The polyethylene insert was ideal position, alignment and stability.  We irrigated again and closed the retinaculum with #1 Vicryl, subcutaneous with #1 Vicryl and subcuticular with 3-0 Monocryl.  Compression bandage was applied and the patient returned to recovery in good condition.  A 25 mL blood loss was noted.  There were no operative complications noted.            SAMANTHA ORTEGA MD             D: 2021   T: 2021   MT: AMANDA      Name:     ANTIONE JULIAN   MRN:      [id number]        Account:        [id number]   :      1950           Procedure Date: 2021      Document: [id number]

## 2021-01-09 NOTE — PHARMACY
dmission Medication History Completed by Pharmacy    See [facility] Admission Navigator for allergy information, preferred outpatient pharmacy, prior to admission medications and immunization status.     Medication history sources:  the patient     Changes made to PTA medication list (reason)  Added: none  Deleted: none  Changed:   Vitamin D3: changed from 88100 units po daily to 5000 units po bid during winter months and daily the rest of the year.    Additional medication history information:   N/a.  - Patient denies taking any additional Rx/OTC medications other than the ones listed below.    Prior to Admission medications    Medication Sig Last Dose Taking? Auth Provider   acetaminophen (TYLENOL) 325 MG tablet Take 325-650 mg by mouth every 6 hours as needed for mild pain 1/7/2021 at 1200 Yes Reported, Patient   calcium carbonate 500 mg, elemental, (OSCAL 500) 1250 (500 Ca) MG TABS tablet Take by mouth 2 times daily 1/7/2021 at 1200 Yes Reported, Patient   Cholecalciferol (VITAMIN D3 PO) Take 5,000 Units by mouth 2 times daily Patient takes 5000 units po bid (with Calciu) during winter months. During the rest of the year, the patient takes 5000 mg po daily (also with Calcium) 1/7/2021 at 1200 Yes Reported, Patient   Coenzyme Q10 (COQ10) 400 MG CAPS Take by mouth every morning  Past Week at Unknown time Yes Reported, Patient   Glucosamine-Chondroit-Vit C-Mn (GLUCOSAMINE CHONDR 1500 COMPLX PO) Take by mouth every morning  Past Week at Unknown time Yes Reported, Patient   MAGNESIUM OXIDE PO Take 500 mg by mouth every morning  Past Week at 1200 Yes Reported, Patient   oxyCODONE (ROXICODONE) 5 MG tablet Take 1 tablet (5 mg) by mouth every 6 hours as needed for severe pain  Yes Sydney Whiteside PA-C          Date completed: 01/09/21    Iwona Good, ElvisD, BCPS January 9, 2021

## 2021-01-10 ENCOUNTER — APPOINTMENT (OUTPATIENT)
Dept: PHYSICAL THERAPY | Facility: CLINIC | Age: 71
DRG: 983 | End: 2021-01-10
Attending: ORTHOPAEDIC SURGERY
Payer: COMMERCIAL

## 2021-01-10 LAB
BLD PROD TYP BPU: NORMAL
BLD PROD TYP BPU: NORMAL
BLD UNIT ID BPU: 0
BLOOD PRODUCT CODE: NORMAL
BPU ID: NORMAL
HGB BLD-MCNC: 12.3 G/DL (ref 11.7–15.7)
NUM BPU REQUESTED: 1
TRANSFUSION STATUS PATIENT QL: NORMAL
TRANSFUSION STATUS PATIENT QL: NORMAL

## 2021-01-10 PROCEDURE — 97110 THERAPEUTIC EXERCISES: CPT | Mod: GP | Performed by: CLINIC/CENTER

## 2021-01-10 PROCEDURE — P9037 PLATE PHERES LEUKOREDU IRRAD: HCPCS | Performed by: INTERNAL MEDICINE

## 2021-01-10 PROCEDURE — 250N000013 HC RX MED GY IP 250 OP 250 PS 637: Performed by: INTERNAL MEDICINE

## 2021-01-10 PROCEDURE — 250N000011 HC RX IP 250 OP 636: Performed by: INTERNAL MEDICINE

## 2021-01-10 PROCEDURE — 97116 GAIT TRAINING THERAPY: CPT | Mod: GP | Performed by: CLINIC/CENTER

## 2021-01-10 PROCEDURE — 250N000013 HC RX MED GY IP 250 OP 250 PS 637: Performed by: STUDENT IN AN ORGANIZED HEALTH CARE EDUCATION/TRAINING PROGRAM

## 2021-01-10 PROCEDURE — 85018 HEMOGLOBIN: CPT | Performed by: STUDENT IN AN ORGANIZED HEALTH CARE EDUCATION/TRAINING PROGRAM

## 2021-01-10 PROCEDURE — 36415 COLL VENOUS BLD VENIPUNCTURE: CPT | Performed by: STUDENT IN AN ORGANIZED HEALTH CARE EDUCATION/TRAINING PROGRAM

## 2021-01-10 RX ORDER — SODIUM CHLORIDE 9 MG/ML
INJECTION, SOLUTION INTRAVENOUS
Status: DISPENSED
Start: 2021-01-10 | End: 2021-01-10

## 2021-01-10 RX ORDER — HYDROCODONE BITARTRATE AND ACETAMINOPHEN 5; 325 MG/1; MG/1
1 TABLET ORAL EVERY 6 HOURS PRN
Status: DISCONTINUED | OUTPATIENT
Start: 2021-01-10 | End: 2021-01-10

## 2021-01-10 RX ORDER — NALOXONE HYDROCHLORIDE 0.4 MG/ML
0.2 INJECTION, SOLUTION INTRAMUSCULAR; INTRAVENOUS; SUBCUTANEOUS
Status: DISCONTINUED | OUTPATIENT
Start: 2021-01-10 | End: 2021-01-11 | Stop reason: HOSPADM

## 2021-01-10 RX ORDER — HYDROCODONE BITARTRATE AND ACETAMINOPHEN 5; 325 MG/1; MG/1
1-2 TABLET ORAL EVERY 6 HOURS PRN
Status: DISCONTINUED | OUTPATIENT
Start: 2021-01-10 | End: 2021-01-11 | Stop reason: HOSPADM

## 2021-01-10 RX ORDER — NALOXONE HYDROCHLORIDE 0.4 MG/ML
0.4 INJECTION, SOLUTION INTRAMUSCULAR; INTRAVENOUS; SUBCUTANEOUS
Status: DISCONTINUED | OUTPATIENT
Start: 2021-01-10 | End: 2021-01-11 | Stop reason: HOSPADM

## 2021-01-10 RX ORDER — DIPHENHYDRAMINE HYDROCHLORIDE 50 MG/ML
25 INJECTION INTRAMUSCULAR; INTRAVENOUS ONCE
Status: COMPLETED | OUTPATIENT
Start: 2021-01-10 | End: 2021-01-10

## 2021-01-10 RX ADMIN — HYDROCODONE BITARTRATE AND ACETAMINOPHEN 1 TABLET: 5; 325 TABLET ORAL at 12:49

## 2021-01-10 RX ADMIN — CELECOXIB 100 MG: 100 CAPSULE ORAL at 08:28

## 2021-01-10 RX ADMIN — DOCUSATE SODIUM AND SENNOSIDES 1 TABLET: 8.6; 5 TABLET ORAL at 08:30

## 2021-01-10 RX ADMIN — HYDROCODONE BITARTRATE AND ACETAMINOPHEN 1 TABLET: 5; 325 TABLET ORAL at 18:05

## 2021-01-10 RX ADMIN — DIPHENHYDRAMINE HYDROCHLORIDE 25 MG: 50 INJECTION, SOLUTION INTRAMUSCULAR; INTRAVENOUS at 10:54

## 2021-01-10 RX ADMIN — HYDROCODONE BITARTRATE AND ACETAMINOPHEN 1 TABLET: 5; 325 TABLET ORAL at 10:32

## 2021-01-10 RX ADMIN — DOCUSATE SODIUM AND SENNOSIDES 1 TABLET: 8.6; 5 TABLET ORAL at 21:14

## 2021-01-10 RX ADMIN — DOCUSATE SODIUM 100 MG: 100 CAPSULE, LIQUID FILLED ORAL at 21:14

## 2021-01-10 RX ADMIN — FAMOTIDINE 20 MG: 20 TABLET, FILM COATED ORAL at 08:28

## 2021-01-10 RX ADMIN — POLYETHYLENE GLYCOL 3350 17 G: 17 POWDER, FOR SOLUTION ORAL at 08:30

## 2021-01-10 RX ADMIN — DOCUSATE SODIUM 100 MG: 100 CAPSULE, LIQUID FILLED ORAL at 08:30

## 2021-01-10 RX ADMIN — FAMOTIDINE 20 MG: 20 TABLET, FILM COATED ORAL at 21:14

## 2021-01-10 RX ADMIN — ACETAMINOPHEN 975 MG: 325 TABLET, FILM COATED ORAL at 21:14

## 2021-01-10 RX ADMIN — CELECOXIB 100 MG: 100 CAPSULE ORAL at 21:14

## 2021-01-10 RX ADMIN — ACETAMINOPHEN 975 MG: 325 TABLET, FILM COATED ORAL at 04:17

## 2021-01-10 RX ADMIN — HYDROXYZINE HYDROCHLORIDE 25 MG: 25 TABLET, FILM COATED ORAL at 08:41

## 2021-01-10 RX ADMIN — OXYCODONE HYDROCHLORIDE 5 MG: 5 TABLET ORAL at 04:18

## 2021-01-10 RX ADMIN — ACETAMINOPHEN 975 MG: 325 TABLET, FILM COATED ORAL at 13:24

## 2021-01-10 RX ADMIN — HYDROXYZINE HYDROCHLORIDE 25 MG: 25 TABLET, FILM COATED ORAL at 21:28

## 2021-01-10 NOTE — PLAN OF CARE
"  VS: Vital signs:  Temp: 96.5  F (35.8  C) Temp src: Oral BP: 127/68 Pulse: 68   Resp: 18 SpO2: 99 % O2 Device: None (Room air) Oxygen Delivery: 2 LPM Height: 160 cm (5' 2.99\") Weight: 70.2 kg (154 lb 12.2 oz)     O2: Above 90% on RA. Lung sounds clear.    Output: Adequate, no voiding concerns   Last BM: 1/10/21.    Activity: SBA with walker. Pt ambulated the halls a few times throughout the day. Feels good on her feer, no dizziness or nausea.    Skin: Intact except for incision   Pain: Per patient request, 1-2 tabs of Norco (Vicodin) Q6 was added for her because she reported that this pain medication has worked better for her pain control for prior surgeries. PRN oxycodone is still ordered if in case Vicodin doesn't work.     Atarax also given this morning.    CMS: Some numbness and tingling present on L lower extremity (surgical leg). Numbness is mainly on the inner portion of her leg and there is slight \"pins and needles) in her toes.    Dressing: CDI.    Diet: Regular   LDA: PIV is saline locked, hemovac drain in place. Drainage at drain insertion site, dressing reinforced and leg is now Ace-wrapped.    Equipment: IV pole, walker, personal belongings   Plan: Continue per patient care plan.    Additional Info: Platelet transfusion was done around 11 am this morning and Eliquis was held until further notice.      "

## 2021-01-10 NOTE — PLAN OF CARE
Alert and oriented X 4. Able to make needs known. Call light in reach. Left knee pain managed with Tylenol ,Oxycodone 5 mg  and ice packs. CMS/Neuros. WNLs, reports numbness  left inner calf to thigh, no changes. Ace/dressing CDI. Hemovac patent with minimal output.  Up to BR with SBA and walker. Voiding without difficulty. Passing flatus. No BM yet. PIV patent, saline locked. Tolerating PO. Denies nausea, headache, dizziness, lightheadedness, chest pain or SOB. Ambulated in pham way. Stable post op patient, appears to be sleeping during rounds. Continue with plan of care.

## 2021-01-10 NOTE — PROGRESS NOTES
Patient was seen, course reviewed with nursing staff and orthopedics.    Patient had a significant amount of bloody drain output over last 24 hours i.e. 250 cc.    She is scheduled to receive Eliquis today for DVT prophylaxis.    Vitals stable  Left leg is wrapped  Left calf is soft      Assessment    Status post left total knee arthroplasty, with significant bloody output from drain, likely secondary to platelet dysfunction.    Plan  In view of ongoing bleeding in the setting of known platelet dysfunction, would recommend repeat 1 unit of platelets today and the holding of Eliquis, probably until tomorrow assuming output has diminished.  Discussed with patient who agrees

## 2021-01-10 NOTE — PROGRESS NOTES
Pt is A&O x4, lung sounds are clear, Heart sounds are WDL, bowel sounds are active and pt is passing gas. Pt c/o numbness on left lateral calf near the knee and tingling in the shin near the foot. Pt is able to void spontaneously and pt ambulates well to the bathroom using the walker. Pt also ambulated in the pham at 1930. Pt taking oxycodone for pain but will call when she would like this to be given. PRN atarax also given. Using blue foam extremity elevator for LLE. Pt uses incentive spirometer independently.     Pt has platelet dysfunction. Hemovac in LLE above the knee. Insertion site has dried drainage. Hemovac marked throughout shift. Orthopedics was notified about pt concern for amount of output and a potential for a platelet infusion. Ortho resident instructed staff to continue to monitor and that the ortho team will come up with a plan in the morning for the pt during rounds.     Patient vital signs are at baseline: Yes  Patient able to ambulate as they were prior to admission or with assist devices provided by therapies during their stay:  Yes  Patient MUST void prior to discharge:  Yes  Patient able to tolerate oral intake:  Yes  Pain has adequate pain control using Oral analgesics:  Yes

## 2021-01-10 NOTE — PROGRESS NOTES
Pt requesting Vicadin order to be 1-2 tablets. Paged ortho fellow and he is OK with that. Verbal order taken.

## 2021-01-10 NOTE — PROGRESS NOTES
"Orthopedic Surgery Progress Note    Subjective / Interval Events:   Patient resting in bed; no acute distress; pain controlled.  Denies chest pain, palpitations, SOB.  Pain well controlled on current regimen.      Patient understands that she will likely stay another day, she will receive 1 unit of platelets, we will hold Eliquis, we will reevaluate drain output tomorrow and likely pull the drain.    Objective:   Vital Signs Temp (24hrs), Av.1  F (36.2  C), Min:95.9  F (35.5  C), Max:98.2  F (36.8  C)    /68 (BP Location: Left arm)   Pulse 72   Temp 97.1  F (36.2  C) (Oral)   Resp 18   Ht 1.6 m (5' 2.99\")   Wt 70.2 kg (154 lb 12.2 oz)   SpO2 99%   BMI 27.42 kg/m        Physical Examination   General: no acute distress  CV: palpable pulses  Resp: Nonlabored breathing  Left LE: dressing in tact; clean and dry      5/5 Iliopsoas, quadricep, tibialis anterior, extensor houses longus, gastrocsoleus.     Sensation to light touch intact in the L2-S1 dermatome distribution.    Dorsalis pedis pulse palpated to be 2/4.    Tolerates range of motion in all major joints     No significant pain or limitation of range of motion.     Output by Drain (mL) 21 07 - 21 1459 21 1500 - 21 2259 21 2300 - 21 0659 21 0700 - 21 1459 21 1500 - 21 2259 21 2300 - 01/10/21 0659 01/10/21 0700 - 01/10/21 0936   Closed/Suction Drain Left Knee Accordion 10 Portuguese 80  170  150         Labs (Last 24 hour):   Lab Results   Component Value Date    WBC 6.3 2020    HGB 13.4 2021    HCT 47.1 2020    MCV 94 2020     2020    RDW 13.0 2020     Lab Results   Component Value Date    BUN 14 2021     2021    CO2 28 2021    ANIONGAP 3 2021       All cultures:  No results for input(s): CULT in the last 168 hours.    Assessment / Plan:   70 year old female s/p left total knee on 2021 by Dr. Rodriguez    Plan: " Ortho Primary  Monitoring: Per unit protocol  Activity: Up with assist.  Weight bearing status: WBAT  Range of motion: As tolerated - NO CPM UNTIL POD7  Antibiotics: Ancef x24 hours perioperatively.  Diet: Begin with clear fluids and progress diet as tolerated.  DVT prophylaxis: Mechanical + ELIQUIS (POST OP DAY 3)  Bracing/Splinting: None  Elevation: Elevate operative extremity to keep at level of heart as much as possible.  Wound Care: Keep dressings in place until POD2  Drain: Hemavac, follow-up output tomorrow  Pain management: PO pain control  X-rays: PACU  Physical Therapy: ROM, ADL's.  Labs: Trend Hgb on POD #1.  Consults: PT + Med Mng  Follow-up: Clinic with Dr. Rodriguez (Angeline KENNEDY) in 2 weeks    Discussed plan with medicine colleagues today.  We are in agreement due to the high output from the drain another unit of platelets as advised, holding Eliquis till tomorrow, pulling drain tomorrow morning.    Tanner Diaz DO  Adult Joint Reconstruction Fellow  Dept Orthopaedic Surgery, [facility]  [phone] Pager [phone] Office

## 2021-01-10 NOTE — UTILIZATION REVIEW
[facility] Utilization Review  Admission Status; Secondary Review Determination     Admission Date: 1/8/2021  5:23 AM      Under the authority of the Utilization Management Committee, the utilization review process indicated a secondary review on the above patient.  The review outcome is based on review of the medical records, discussions with staff, and applying clinical experience noted on the date of the review.        (X)      Inpatient Status Appropriate - This patient's medical care is consistent with medical management for inpatient care and reasonable inpatient medical practice.          RATIONALE FOR DETERMINATION   Maria Luisa Garcia is a 70 year old female with past medical history of OA and ACL reconstruction, who is hospitalized as outpatient on 1/8 post elective left total knee arthroplasty for postoperative management, PT/OT, pain control and safe disposition.  However, the procedure was uncomplicated but she continued to have pain and bloody drain output suspected to be secondary to platelet dysfunction.  She received platelet transfusion but today continues to have significant drain output and plan is to repeat platelet transfusion and monitor for further bleeding.  In addition, she will need to be resumed on Eliquis if bleeding resolves.  In light of change in clinical status, ongoing bloody drain discharge requiring platelet transfusions and need for close monitoring and further management with anticipated length of stay more than 2 midnights, criteria for inpatient admission is met.  Recommendation is communicated to the primary team ().      The definitions of Inpatient Status and Observation Status used in making the determination above are those provided in the CMS Coverage Manual, Chapter 1 and Chapter 6, section 70.4.      Sincerely,       Jazmyn Yanez MD, MS  Physician Advisor  Utilization Review-[place]    Phone: [phone]

## 2021-01-10 NOTE — PLAN OF CARE
Physical Therapy Discharge Summary    Reason for therapy discharge:    All goals and outcomes met, no further needs identified.    Progress towards therapy goal(s). See goals on Care Plan in [facility] electronic health record for goal details.  Goals met    Therapy recommendation(s):    Continued therapy is recommended.  Rationale/Recommendations:  Pt has OP PT scheduled already, will benefit from OP PT for progression of strength, ROM and functional mobility.

## 2021-01-11 ENCOUNTER — TELEPHONE (OUTPATIENT)
Dept: ORTHOPEDICS | Facility: CLINIC | Age: 71
End: 2021-01-11

## 2021-01-11 VITALS
HEIGHT: 63 IN | DIASTOLIC BLOOD PRESSURE: 68 MMHG | HEART RATE: 79 BPM | RESPIRATION RATE: 16 BRPM | WEIGHT: 154.76 LBS | TEMPERATURE: 98.6 F | SYSTOLIC BLOOD PRESSURE: 153 MMHG | BODY MASS INDEX: 27.42 KG/M2 | OXYGEN SATURATION: 100 %

## 2021-01-11 PROBLEM — Z98.890 POST-OPERATIVE STATE: Status: ACTIVE | Noted: 2021-01-11

## 2021-01-11 LAB
BLD PROD TYP BPU: NORMAL
BLD UNIT ID BPU: 0
BLOOD PRODUCT CODE: NORMAL
BPU ID: NORMAL
TRANSFUSION STATUS PATIENT QL: NORMAL
TRANSFUSION STATUS PATIENT QL: NORMAL

## 2021-01-11 PROCEDURE — 250N000011 HC RX IP 250 OP 636: Performed by: INTERNAL MEDICINE

## 2021-01-11 PROCEDURE — 250N000013 HC RX MED GY IP 250 OP 250 PS 637: Performed by: STUDENT IN AN ORGANIZED HEALTH CARE EDUCATION/TRAINING PROGRAM

## 2021-01-11 PROCEDURE — 99207 PR NO BILLABLE SERVICE THIS VISIT: CPT | Performed by: INTERNAL MEDICINE

## 2021-01-11 PROCEDURE — 250N000013 HC RX MED GY IP 250 OP 250 PS 637: Performed by: INTERNAL MEDICINE

## 2021-01-11 PROCEDURE — 120N000002 HC R&B MED SURG/OB UMMC

## 2021-01-11 PROCEDURE — P9037 PLATE PHERES LEUKOREDU IRRAD: HCPCS | Performed by: INTERNAL MEDICINE

## 2021-01-11 RX ORDER — HYDROXYZINE HYDROCHLORIDE 25 MG/1
25 TABLET, FILM COATED ORAL EVERY 6 HOURS PRN
Qty: 40 TABLET | Refills: 0 | Status: ON HOLD | OUTPATIENT
Start: 2021-01-11 | End: 2021-02-12

## 2021-01-11 RX ORDER — SODIUM CHLORIDE 9 MG/ML
INJECTION, SOLUTION INTRAVENOUS
Status: DISCONTINUED
Start: 2021-01-11 | End: 2021-01-11 | Stop reason: HOSPADM

## 2021-01-11 RX ORDER — AMOXICILLIN 250 MG
1 CAPSULE ORAL 2 TIMES DAILY
Qty: 14 TABLET | Refills: 0 | Status: SHIPPED | OUTPATIENT
Start: 2021-01-11 | End: 2021-02-02

## 2021-01-11 RX ORDER — OXYCODONE HYDROCHLORIDE 5 MG/1
5-10 TABLET ORAL EVERY 6 HOURS PRN
Qty: 45 TABLET | Refills: 0 | Status: ON HOLD | OUTPATIENT
Start: 2021-01-11 | End: 2021-02-11

## 2021-01-11 RX ORDER — ACETAMINOPHEN 325 MG/1
650 TABLET ORAL EVERY 4 HOURS PRN
Qty: 1 BOTTLE | Refills: 0 | Status: ON HOLD | OUTPATIENT
Start: 2021-01-11 | End: 2021-02-11

## 2021-01-11 RX ORDER — DIPHENHYDRAMINE HYDROCHLORIDE 50 MG/ML
25 INJECTION INTRAMUSCULAR; INTRAVENOUS ONCE
Status: COMPLETED | OUTPATIENT
Start: 2021-01-11 | End: 2021-01-11

## 2021-01-11 RX ORDER — CELECOXIB 100 MG/1
100 CAPSULE ORAL 2 TIMES DAILY
Qty: 20 CAPSULE | Refills: 0 | Status: ON HOLD | OUTPATIENT
Start: 2021-01-11 | End: 2021-02-13

## 2021-01-11 RX ADMIN — HYDROCODONE BITARTRATE AND ACETAMINOPHEN 1 TABLET: 5; 325 TABLET ORAL at 11:24

## 2021-01-11 RX ADMIN — OXYCODONE HYDROCHLORIDE 5 MG: 5 TABLET ORAL at 00:16

## 2021-01-11 RX ADMIN — DOCUSATE SODIUM 100 MG: 100 CAPSULE, LIQUID FILLED ORAL at 08:25

## 2021-01-11 RX ADMIN — HYDROCODONE BITARTRATE AND ACETAMINOPHEN 1 TABLET: 5; 325 TABLET ORAL at 05:50

## 2021-01-11 RX ADMIN — DIPHENHYDRAMINE HYDROCHLORIDE 25 MG: 50 INJECTION, SOLUTION INTRAMUSCULAR; INTRAVENOUS at 09:22

## 2021-01-11 RX ADMIN — POLYETHYLENE GLYCOL 3350 17 G: 17 POWDER, FOR SOLUTION ORAL at 08:25

## 2021-01-11 RX ADMIN — HYDROCODONE BITARTRATE AND ACETAMINOPHEN 1 TABLET: 5; 325 TABLET ORAL at 16:34

## 2021-01-11 RX ADMIN — CELECOXIB 100 MG: 100 CAPSULE ORAL at 08:25

## 2021-01-11 RX ADMIN — FAMOTIDINE 20 MG: 20 TABLET, FILM COATED ORAL at 08:25

## 2021-01-11 RX ADMIN — DOCUSATE SODIUM AND SENNOSIDES 1 TABLET: 8.6; 5 TABLET ORAL at 08:25

## 2021-01-11 NOTE — PROGRESS NOTES
Bleeding noted at drain site. Gauge applied. Patient got her own Cryocuff from home and currently using on knee. Ace wrap removed and skin inspected. Will continue to monitor patient.

## 2021-01-11 NOTE — PROGRESS NOTES
"Orthopedic Surgery Progress Note    Subjective / Interval Events:   Patient resting in bed; no acute distress; pain controlled.  Denies chest pain, palpitations, SOB.  Pain well controlled on current regimen.      Patient aware of the plan RE consult to Heme/Onc and likely repeat platelet transfusion.     Objective:   Vital Signs Temp (24hrs), Av.1  F (36.2  C), Min:95.9  F (35.5  C), Max:98.2  F (36.8  C)    BP (!) 143/65 (BP Location: Left arm)   Pulse 67   Temp 96.5  F (35.8  C) (Oral)   Resp 16   Ht 1.6 m (5' 2.99\")   Wt 70.2 kg (154 lb 12.2 oz)   SpO2 99%   BMI 27.42 kg/m        Physical Examination   General: no acute distress  CV: palpable pulses  Resp: Nonlabored breathing  Left LE: dressing in tact; clean and dry      5/5 Iliopsoas, quadricep, tibialis anterior, extensor houses longus, gastrocsoleus.     Sensation to light touch intact in the L2-S1 dermatome distribution.    Dorsalis pedis pulse palpated to be 2/4.    Tolerates range of motion in all major joints     No significant pain or limitation of range of motion.     Output by Drain (mL) 21 0700 - 21 1459 21 1500 - 21 2259 21 2300 - 01/10/21 0659 01/10/21 0700 - 01/10/21 1459 01/10/21 1500 - 01/10/21 2259 01/10/21 2300 - 21 0616   Closed/Suction Drain Left Knee Accordion 10 Maldivian  150  30 20        Labs (Last 24 hour):   Lab Results   Component Value Date    WBC 6.3 2020    HGB 13.4 2021    HCT 47.1 2020    MCV 94 2020     2020    RDW 13.0 2020     Lab Results   Component Value Date    BUN 14 2021     2021    CO2 28 2021    ANIONGAP 3 2021       All cultures:  No results for input(s): CULT in the last 168 hours.    Assessment / Plan:   70 year old female s/p left total knee on 2021 by Dr. Rodriguez    Plan: Ortho Primary  Monitoring: Per unit protocol  Activity: Up with assist.  Weight bearing status: WBAT  Range of motion: As " tolerated - NO CPM UNTIL POD7  Antibiotics: Ancef x24 hours perioperatively.  Diet: Begin with clear fluids and progress diet as tolerated.  DVT prophylaxis: Mechanical + ELIQUIS (POST OP DAY 3)  Bracing/Splinting: None  Elevation: Elevate operative extremity to keep at level of heart as much as possible.  Wound Care: Keep dressings in place until POD2  Drain: Hemavac, follow-up output tomorrow  Pain management: PO pain control  X-rays: PACU  Physical Therapy: ROM, ADL's.  Labs: Trend Hgb on POD #1.  Consults: PT + Med Mng  Follow-up: Clinic with Dr. Rodriguez (Angeline RN) in 2 weeks    Maintain drain and consult heme/onc re possible repeat platelet transfusion    Tanner Diaz DO  Adult Joint Reconstruction Fellow  Dept Orthopaedic Surgery, [facility]  [phone] Pager [phone] Office

## 2021-01-11 NOTE — PLAN OF CARE
VS: VSS. Denies CP/SOB   O2: >90% on RA    Output: Voiding adequate amounts w/o pain or difficulty    Last BM: 1/11   Activity: Up independently, uses walker, steady gait. Ambulating in halls, tolerating well    Up for meals? Declined    Skin: Incision    Pain: Pain in L knee, managing with Norco q 6 and ice packs    CMS: Intact    Dressing: CDI   Diet: Regular, tolerating well    LDA: PIV saline locked. HV output of 30 mLs, removed by Dr. Rodriguez at 1500. No new drainage at site, pt educated on monitoring for signs of bleeding   Additional Info: Received 1 unit of platelets. No infusion reaction.      DISCHARGE SUMMARY    Pt discharging to: Home  Transportation: Son   AVS given and discussed: Yes, no further questions   Medications given: Yes   Belongings returned: Yes  Comments: PIV removed. Escorted safely to elevators.

## 2021-01-11 NOTE — TELEPHONE ENCOUNTER
Maria Luisa underwent Left TKA on 1/8/21 with planned RTKA on 2/11/21.  Maria Luisa phoned RN to ask for appt with Dr Rodriguez on 1/18/21 per Dr Rodriguez.  Appt has been scheduled.  She will keep her other scheduled appts with Angeline Whaley NP.  Roseline Spencer RN

## 2021-01-11 NOTE — PROGRESS NOTES
Pt was seen, course reviewed  Pt received one unit plts 1/10/21, continues to have moderate drainage from hemovac and knee swelling. To be seen by Dr Rodriguez later today  Planned proph Eliquis remains on hold    No cough, chest pain, SOB.  Pt is progressing in therapy    VSS  Afebrile  L knee mod swelling, warmth  No L calf tenderness or edema  Lungs clear  CV rrr      Assessment    Status post left total knee arthroplasty, with significant bloody output from drain, likely secondary to platelet dysfunction. Pt received 1 unit platelet pre op, one unit on 1/10/21.  Has not yet been started on eliquis for planned DVT proph.    Plan  Repeat 1 unit platelet today  Continue to hold eliquis  Dr Rodriguez to see today

## 2021-01-11 NOTE — PLAN OF CARE
Pt A&O x's 4. VSS. Afebrile. 02 sats in the 90s.  Lungs clear. Denies SOB, CP and nausea. Tolerating regular diet. Bowel sound active in all quadrants. No BM during the shift,  passing gas. Voiding adequate amount into the toilet.  Pain well managed with CMS intact, denies N/T. Left knee dressing clean, dry and intact with light drainage under the drain site dressing. Hemovac output 40 cc. PIV  patent and SL.Pt slept between care and is able to make needs known, call light with in reach. Will continue to monitor.

## 2021-01-12 ENCOUNTER — TELEPHONE (OUTPATIENT)
Dept: ORTHOPEDICS | Facility: CLINIC | Age: 71
End: 2021-01-12

## 2021-01-12 ENCOUNTER — PATIENT OUTREACH (OUTPATIENT)
Dept: CARE COORDINATION | Facility: CLINIC | Age: 71
End: 2021-01-12

## 2021-01-12 DIAGNOSIS — D69.1 PLATELET DYSFUNCTION (H): Primary | ICD-10-CM

## 2021-01-12 DIAGNOSIS — Z09 POSTOPERATIVE FOLLOW-UP: ICD-10-CM

## 2021-01-12 NOTE — TELEPHONE ENCOUNTER
Maria Luisa was called back by RN.  Per PAC and hematologist plan, pt is to have 14 days of Eliquis BID starting on 1/14/21.  Rx was sent to pt's pharmacy.  Pt has platelet disorder, and she underwent total knee replacement on 1/8/21.  Roseline Spencer RN

## 2021-01-12 NOTE — PROGRESS NOTES
The patient will be contacted by another RN for post-hospital follow up and medication discussion. Will close encounter at this time.    Bárbara Ryder CMA, [facility]  Post Hospital Discharge Team

## 2021-01-12 NOTE — TELEPHONE ENCOUNTER
[facility] Call Center    Phone Message    May a detailed message be left on voicemail: yes     Reason for Call: Medication Question or concern regarding medication   Prescription Clarification  Name of Medication: Eliquis  Prescribing Provider: Jennifer    Ph[facility]acy: [facility] Phone #[phone]   What on the order needs clarification? She was discharged and read on after summary visit that she was supposed to be prescribed Eliquis for her joints           Action Taken: Message routed to:  [facility] ortho    Travel Screening: Not Applicable    Please call pt back and confirm prescription

## 2021-01-13 DIAGNOSIS — Z11.59 ENCOUNTER FOR SCREENING FOR OTHER VIRAL DISEASES: ICD-10-CM

## 2021-01-13 DIAGNOSIS — Z96.652 HISTORY OF TOTAL KNEE ARTHROPLASTY, LEFT: Primary | ICD-10-CM

## 2021-01-14 ENCOUNTER — HEALTH MAINTENANCE LETTER (OUTPATIENT)
Age: 71
End: 2021-01-14

## 2021-01-15 NOTE — DISCHARGE SUMMARY
ORTHOPAEDIC DISCHARGE SUMMARY     Date of Admission: 1/8/2021  Date of Discharge: 1/11/2021  5:57 PM  Disposition: Home  Staff Physician: Dr. Walter Rodriguez  Primary Care Provider: No Ref-Primary, Physician    DISCHARGE DIAGNOSIS:  Tricompartment osteoarthritis of both knees [M17.0]    PROCEDURES: Procedure(s):  Left total knee arthroplasty on 1/8/2021    BRIEF HISTORY:  Maria Luisa Garcia 70 year old female with left knee osteoarthritis.  Maria Luisa Garcia has been followed as an outpatient with attempts at non-operative management.    HOSPITAL COURSE:    On 1/8/2021 Maria Luisa Garcia underwent left total knee.  Surgery was uncomplicated.  Maria Luisa Garcia did well post-operatively and worked with physical and occupational therapy. The Internal Medicine physicians were consulted post operatively to aid in management of medical comorbidities.  The patient was tolerating a regular diet without GI distress/nausea or vomiting and was voiding spontaneously. All PT/OT goals for safe discharge were met. Pain medications were weaned to the point where post-operative pain was controlled on oral medications. Stool softeners have been used while taking pain medications to help prevent constipation. Maria Luisa Garcia was deemed medically safe to discharge.     Antibiotics:  Given periop and 24 hours postop.  DVT prophylaxis:  Aspirin daily for 4 weeks  PT Progress:  Has met PT/OT goals for safe mobility.   Pain Meds:  Weaned off all IV pain meds by discharge.  Inpatient Events: No significant events or complications.    Discharge orders and instructions as below.    FOLLOWUP:    Angeline Whaley in clinic 2 weeks post-op  Dr. Rodriguez in clinic 6 weeks post-op    [facility] ([address] [place] [address]). Call [phone] to schedule a follow-up appointment at this location with your provider.     Future Appointments   Date Time Provider Department Center   1/18/2021  7:55 AM [id number] UCOXR [facility]   1/18/2021   8:15 AM Walter Rodriguez MD [facility]   1/21/2021 11:30 AM Angeline Whaley APRN CNP [facility]   3/4/2021 10:40 AM [email or web address] CXR [facility]   3/4/2021 11:10 AM UCSCORTHOXR1 OXR [facility]   3/4/2021 11:30 AM Angeline Whaley APRN [facility]       PLANNED DISCHARGE ORDERS:           Discharge Medication List as of 1/11/2021  4:20 PM      START taking these medications    Details   celecoxib (CELEBREX) 100 MG capsule Take 1 capsule (100 mg) by mouth 2 times daily, Disp-20 capsule, R-0, E-Prescribe      hydrOXYzine (ATARAX) 25 MG tablet Take 1 tablet (25 mg) by mouth every 6 hours as needed for other (adjuvant pain), Disp-40 tablet, R-0, E-Prescribe      senna-docusate (SENOKOT-S/PERICOLACE) 8.6-50 MG tablet Take 1 tablet by mouth 2 times daily, Disp-14 tablet, R-0, E-Prescribe         CONTINUE these medications which have CHANGED    Details   acetaminophen (TYLENOL) 325 MG tablet Take 2 tablets (650 mg) by mouth every 4 hours as needed for other, Disp-1 Bottle, R-0, E-Prescribe      oxyCODONE (ROXICODONE) 5 MG tablet Take 1-2 tablets (5-10 mg) by mouth every 6 hours as needed for severe pain, Disp-45 tablet, R-0, E-Prescribe         CONTINUE these medications which have NOT CHANGED    Details   calcium carbonate 500 mg, elemental, (OSCAL 500) 1250 (500 Ca) MG TABS tablet Take by mouth 2 times daily, Historical      Cholecalciferol (VITAMIN D3 PO) Take 5,000 Units by mouth 2 times daily Patient takes 5000 units po bid (with Calciu) during winter months. During the rest of the year, the patient takes 5000 units po daily (also with Calcium), Historical      Coenzyme Q10 (COQ10) 400 MG CAPS Take by mouth every morning , Historical      Glucosamine-Chondroit-Vit C-Mn (GLUCOSAMINE CHONDR 1500 COMPLX PO) Take by mouth every morning , Historical      MAGNESIUM OXIDE PO Take 500 mg by mouth every morning , Historical               Discharge Procedure Orders   Physical Therapy Referral   Standing Status: Future  "  Referral Priority: Routine Referral Type: Rehab Therapy Physical Therapy   Number of Visits Requested: 1     Reason for your hospital stay   Order Comments: You stayed in the hospital overnight following your knee surgery     Contact Surgeon Team   Order Comments: You may experience symptoms that require follow-up before your scheduled appointment. Refer to the \"[employer] Tool\" for instructions on when to contact Dr. Goldman's office if you are concerned about pain control, blood clots, constipation, or if you are unable to urinate.    Regular business hours (Monday - Friday, 8am - 5pm):  [facility]: [phone]  [facility]: [phone]    After hours and weekends:  [facility] on call Orthopedic resident: [phone]     Orthopedic Urgent Care   Order Comments: If you are not able to reach Dr. Goldman's office and you need immediate care, go to the Orthopedic Urgent Care at your Surgeon's office.  Do NOT go to the Emergency Room unless you have shortness of breath, chest pain, or other signs of a medical emergency.     Call 911   Order Comments: Call 911 immediately if you experience sudden-onset chest pain, arm weakness/numbness, slurred speech, or shortness of breath     Breathing exercises   Order Comments: Perform breathing exercises using your Incentive Spirometer 10 times per hour while awake for 2 weeks.     Fever Management   Order Comments: A low grade fever can be expected after surgery.  Use acetaminophen (TYLENOL) as needed for fever management.  Contact your Surgeon Team if you have a fever greater than 101.5 F, chills, and/or night sweats.     Constipation management   Order Comments: Constipation (hard, dry bowel movements) is expected after surgery due to the combination of being less active, the anesthetic, and the opioid pain medication.  You can do the following to help reduce constipation:  ~  FLUIDS:  Drink clear liquids " (water or Gatorade), or juice (apple/prune).  ~  DIET:  Eat a fiber rich diet.    ~  ACTIVITY:  Get up and move around several times a day.  Increase your activity as you are able.  MEDICATIONS:  Reduce the risk of constipation by starting medications before you are constipated.  You can take Miralax   (1 packet as directed) and/or a stool softener (Senokot 1-2 tablets 1-2 times a day).  If you already have constipation and these medications are not working, you can get magnesium citrate and use as directed.  If you continue to have constipation you can try an over the counter suppository or enema.  Call your Surgeon Team if it has been greater than 3 days since your last bowel movement.     Reduced Urine Output   Order Comments: Changes in the amount of fluids you drank before and after surgery may result in problems urinating.  It is important to stay well-hydrated after surgery and drink plenty of water. If it has been greater than 8 hours since you have urinated despite drinking plenty of water, call your Surgeon Team.     Exercises to prevent blood clots   Order Comments: Unless otherwise directed by your Surgeon team, perform the following exercises at least three times per day for the first four weeks after surgery to prevent blood clots in your legs: 1) Point and flex your feet (Ankle Pumps), 2) Move your ankle around in big circles, 3) Wiggle your toes, 4) Walk, even for short distances, several times a day, will help decrease the risk of blood clots.     Pain after Surgery   Order Comments: Pain after surgery is normal and expected.  You will   have some amount of pain for several weeks after surgery.  Your pain will improve with time.  There are several things you can do to help reduce your pain including: rest, ice, elevation, and using pain medications as needed. Contact your Surgeon Team if you have pain that persists or worsens after surgery despite rest, ice, elevation, and taking your medication(s)  "as prescribed. Contact your Surgeon Team if you have new numbness, tingling, or weakness in your operative extremity.     Swelling after Surgery   Order Comments: Swelling and/or bruising of the surgical extremity is common and may persist for several months after surgery. In addition to frequent icing and elevation, gentle compressive support with an ACE wrap or tubigrip may help with swelling. Apply compression regularly, removing at least twice daily to perform skin checks. Contact your Surgeon Team if your swelling increases and is NOT associated with an increase in your activity level, or if your swelling increases and is associated with redness and pain.     LOWER Extremity Elevation   Order Comments: Swelling is expected for several months after surgery. This type of swelling is usually associated with gravity and activity, and can be improved with elevation.   The best way to do this is to get your \"toes above your nose\" by laying down and placing several pillows lengthwise under your calf and heel. When elevating your leg keep your knee completely straight. Perform this elevation as often as possible especially for the first two weeks after surgery.     Cold therapy   Order Comments: Ice can be used to control swelling and discomfort after surgery. Place a thin towel over your operative site and apply the ice pack overtop. Leave ice pack in place for 20 minutes, then remove for 20 minutes. Repeat this 20 minutes on/20 minutes off routine as often as tolerated.     acetaminophen (TYLENOL) Instructions   Order Comments: You were discharged with acetaminophen (TYLENOL) for pain management after surgery. Acetaminophen most effectively manages pain symptoms when it is taken on a schedule without missing doses (every four, six, or eight hours). Your Provider will prescribe a safe daily dose between 3000 - 4000 mg.  Do NOT exceed this daily dose. Most patients use acetaminophen for pain control for the first four " weeks after surgery.  You can wean from this medication as your pain decreases.     NSAID Instructions   Order Comments: You were discharged with an anti-inflammatory medication for pain management to use in combination with acetaminophen (TYLENOL) and the narcotic pain medication.  Take this medication exactly as directed.  You should only take one anti-inflammatory at a time.  Some common anti-inflammatories include: ibuprofen (ADVIL, MOTRIN), naproxen (ALEVE, NAPROSYN), celecoxib (CELEBREX), meloxicam (MOBIC), ketorolac (TORADOL).  Take this medication with food and water.     Opioids - Tapering Instructions   Order Comments: In the first three days following surgery, your symptoms may warrant use of the narcotic pain medication every four to six hours as prescribed. This is normal. As your pain symptoms improve, focus your efforts on decreasing (tapering) use of narcotic medications. The most successful tapering strategy is to first, decrease the number of tablets you take every 4-6 hours to the minimum prescribed. Then, increase the amount of time between doses.  For example:  First, taper to   or 1 tablet every 4-6 hours.  Then, taper to   or 1 tablet every 6-8 hours.  Then, taper to   or 1 tablet every 8-10 hours.  Then, taper to   or 1 tablet every 10-12 hours.  Then, taper to   or 1 tablet at bedtime.  The bedtime dose can help with comfort during sleep and is typically the last dose to be discontinued after surgery.     Physical Therapy Instructions   Order Comments: Begin physical therapy within one week following surgery.     Activity - Total Knee Arthroplasty     Order Specific Question Answer Comments   Is discharge order? Yes      Return to Driving   Order Comments: Driving is NOT permitted until directed by your provider. Under no circumstance are you permitted to drive while using narcotic pain medications.     Return to athletic activities   Order Comments: Activities such as swimming, bicycling,  jogging, running, and stop-and-go sports should be avoided until permitted by your Provider.     Return to school/work   Order Comments: You may return to work/school when directed by your Provider.     Continuous Passive Motion Machine   Order Comments: [Do not initiate until post-op day 7] Settings (degrees): 0 degrees to flexion tolerance with a goal of 90 degrees  Advance CPM (degrees): increments of 5 degrees every 30 minutes.  Perform for 6-8 hours daily for four weeks     Weight bearing as tolerated   Order Comments: Weight bearing as tolerated on your operative extremity.     Wound care   Order Comments: A compressive device (ACE wrap or tubigrip) was placed on your surgical extremity. This was placed to prevent swelling after surgery and should be removed briefly twice daily to prevent skin breakdown. Remove the compressive device for showering (see showering instructions below). Continue use of the compressive device until your first office visit.    A sterile dressing was placed over your surgical incision. This dressing should be kept in place for the first seven days after surgery. After seven days, remove the dressing and leave the incision open to air, covering only for showering (see showering instructions below). Your surgical incision was closed with Exofin (a surgical adhesive that is directly on the incision areas). The Exofin should be left on until it falls off or is removed at your first office visit. Under no circumstance are you allowed to pick or scratch your incision. Please contact Dr. Goldman's office if you notice the followin) significant cloudy, bloody, or malodorous drainage from the incision, 2) excessive warmth and redness around the incision.     Shower with wound/dressing covered   Order Comments: You may shower the day after surgery, however, the surgical incision must remain dry until your first office visit. During the first seven days after surgery, your surgical dressing  will prevent the incision from becoming wet. Once the surgical dressing is removed, cover the incision with saran wrap (or any other non-permeable covering) to keep the incision dry while showering. Always remove the ACE wrap or tubigrip for showering. You are strictly prohibited from soaking or submerging the surgical wound underwater.     NO Tub bathing   Order Comments: Tub bathing, swimming, or any other activities that will cause your incision to be submerged in water should be avoided until allowed by your Surgeon.     Anticoagulation - other   Order Comments: Take the eliquis as prescribed for a total of 14 days (starting on post-op day two) after surgery.  This is given to help minimize your risk of blood clot.     Opioid Instructions (Greater than or equal to 65 years)   Order Comments: You were discharged with an opioid medication (hydromorphone, oxycodone, hydrocodone, or tramadol). This medication should only be taken for breakthrough pain that is not controlled with acetaminophen (TYLENOL). If you rate your pain less than 3 you do not need this medication.  Pain rating 0-3:  You do not need this medication  Pain rating 4-6:  Take 1/2 tablet every 4-6 hours as needed  Pain rating 7-10:  Take 1 tablet every 4-6 hours as needed  Do not exceed 4 tablets per day     Follow Up Care   Order Comments: You are scheduled for a post operative wound check with Dr. Rodriguez clinic approximately two weeks after surgery. At approximately eight weeks after surgery, you will see  in clinic. During this visit, repeat X rays of your operative knee will be performed.      Your follow up appointments will be at the location that you regularly see Dr. Rodriguez    [facility]  [address] [place] [address] [phone]    [facility]  [address] [place] [id number] [phone]     Crutches DME   Order Comments: DME Documentation: Describe the  reason for need to support medical necessity: Impaired gait status post knee surgery. I, the undersigned, certify that the above prescribed supplies are medically necessary for this patient and is both reasonable and necessary in reference to accepted standards of medical practice in the treatment of this patient's condition and is not prescribed as a convenience.     Order Specific Question Answer Comments   DME Provider: [facility]    Crutch Type: Standard    Crutches Add On: NA    Length of Need: Lifetime      Cane DME   Order Comments: DME Documentation: Describe the reason for need to support medical necessity: Impaired gait status post knee surgery. I, the undersigned, certify that the above prescribed supplies are medically necessary for this patient and is both reasonable and necessary in reference to accepted standards of medical practice in the treatment of this patient's condition and is not prescribed as a convenience.     Order Specific Question Answer Comments   DME Provider: [facility]    Cane Type: Single Tip    Reminder: Patient can typically get 1 every 5 years      Walker DME   Order Comments: DME Documentation: Describe the reason for need to support medical necessity: Impaired gait status post knee surgery. I, the undersigned, certify that the above prescribed supplies are medically necessary for this patient and is both reasonable and necessary in reference to accepted standards of medical practice in the treatment of this patient's condition and is not prescribed as a convenience.     Order Specific Question Answer Comments   DME Provider: [facility]    Walker Type: Standard (2 Wheel)    Accessories: N/A      Regular Diet Adult     Order Specific Question Answer Comments   Is discharge order? Yes      Assign Questionnaire Series to Patient     Tanner Diaz, DO  Adult Joint Reconstruction Fellow  Dept Orthopaedic Surgery, [facility]  [phone] Pager [phone]  Office

## 2021-01-17 ENCOUNTER — DOCUMENTATION ONLY (OUTPATIENT)
Dept: CARE COORDINATION | Facility: CLINIC | Age: 71
End: 2021-01-17

## 2021-01-18 ENCOUNTER — OFFICE VISIT (OUTPATIENT)
Dept: ORTHOPEDICS | Facility: CLINIC | Age: 71
End: 2021-01-18
Payer: COMMERCIAL

## 2021-01-18 ENCOUNTER — ANCILLARY PROCEDURE (OUTPATIENT)
Dept: GENERAL RADIOLOGY | Facility: CLINIC | Age: 71
End: 2021-01-18
Attending: ORTHOPAEDIC SURGERY
Payer: COMMERCIAL

## 2021-01-18 DIAGNOSIS — Z47.1 AFTERCARE FOLLOWING LEFT KNEE JOINT REPLACEMENT SURGERY: Primary | ICD-10-CM

## 2021-01-18 DIAGNOSIS — Z96.652 H/O TOTAL KNEE REPLACEMENT, LEFT: ICD-10-CM

## 2021-01-18 DIAGNOSIS — Z96.652 HISTORY OF TOTAL KNEE ARTHROPLASTY, LEFT: ICD-10-CM

## 2021-01-18 DIAGNOSIS — Z96.652 AFTERCARE FOLLOWING LEFT KNEE JOINT REPLACEMENT SURGERY: Primary | ICD-10-CM

## 2021-01-18 PROCEDURE — 99024 POSTOP FOLLOW-UP VISIT: CPT | Performed by: NURSE PRACTITIONER

## 2021-01-18 PROCEDURE — 73560 X-RAY EXAM OF KNEE 1 OR 2: CPT | Mod: LT | Performed by: RADIOLOGY

## 2021-01-18 NOTE — PROGRESS NOTES
"DOS: 1/8/2021 L TKA    Maria Luisa is seen today in follow up of her left total knee replacement. Her incision is healing uneventfully and without active drainage. There is mild swelling with eccymosis noted to anterior shin that is mildly tender to palpate but \"improving\". She denies fevers, chills, SOB or chest pain. She is using a walker for long distance walking. She is taking nothing for pain except for occasional tylenol. ROM:0-118. Quad strength 5/5. + CMS    Xrays were taken today show ideal position of the tibial and femoral components without lucency with neutral anatomic alignment.    Dx:  1. L TKA    Plan:  1. Continue to advance activities as tolerated.    "

## 2021-01-18 NOTE — LETTER
"    1/18/2021         RE: Maria Luisa Garcia  [address]  [place] [address]        Dear Colleague,    Thank you for referring your patient, Maria Luisa Garcia, to the [facility] [place]. Please see a copy of my visit note below.    DOS: 1/8/2021 L TKA    Maria Luisa is seen today in follow up of her left total knee replacement. Her incision is healing uneventfully and without active drainage. There is mild swelling with eccymosis noted to anterior shin that is mildly tender to palpate but \"improving\". She denies fevers, chills, SOB or chest pain. She is using a walker for long distance walking. She is taking nothing for pain except for occasional tylenol. ROM:0-118. Quad strength 5/5. + CMS    Xrays were taken today show ideal position of the tibial and femoral components without lucency with neutral anatomic alignment.    Dx:  1. L TKA    Plan:  1. Continue to advance activities as tolerated.      Sincerely,        Walter Rodriguez MD    "

## 2021-01-21 DIAGNOSIS — D69.1 PLATELET DYSFUNCTION (H): ICD-10-CM

## 2021-01-21 DIAGNOSIS — D69.1 PLATELET DYSFUNCTION (H): Primary | ICD-10-CM

## 2021-01-21 DIAGNOSIS — Z96.652 AFTERCARE FOLLOWING LEFT KNEE JOINT REPLACEMENT SURGERY: Primary | ICD-10-CM

## 2021-01-21 DIAGNOSIS — Z47.1 AFTERCARE FOLLOWING LEFT KNEE JOINT REPLACEMENT SURGERY: Primary | ICD-10-CM

## 2021-01-21 NOTE — PROGRESS NOTES
Orders for platelets were entered.  Maria Luisa is to have one unit platelet transfusion AND premedications 500mg tylenol and 50mg.    The above platelet transfusions and premedications are to be given:    Morning, before 2/11/21 surgery.  Evening 2/11/21 after surgery  Morning  2/12/21 day following surgery    Roseline Spencer RN

## 2021-01-25 DIAGNOSIS — Z09 POSTOPERATIVE FOLLOW-UP: Primary | ICD-10-CM

## 2021-01-25 NOTE — TELEPHONE ENCOUNTER
FUTURE VISIT INFORMATION      SURGERY INFORMATION:    Date: 2021    Location: [facility] OR     Surgeon:  Walter Rodriguez MD     Anesthesia Type:  General    Procedure: Right total knee arthroplasty     Consult: 2021    RECORDS REQUESTED FROM:       Primary Care Provider: No reference        Most recent EKG+ Tracin2010

## 2021-01-27 DIAGNOSIS — M17.0 BILATERAL PRIMARY OSTEOARTHRITIS OF KNEE: Primary | ICD-10-CM

## 2021-02-01 ENCOUNTER — ANCILLARY PROCEDURE (OUTPATIENT)
Dept: GENERAL RADIOLOGY | Facility: CLINIC | Age: 71
End: 2021-02-01
Attending: NURSE PRACTITIONER
Payer: COMMERCIAL

## 2021-02-01 DIAGNOSIS — M17.0 BILATERAL PRIMARY OSTEOARTHRITIS OF KNEE: ICD-10-CM

## 2021-02-01 PROCEDURE — 77073 BONE LENGTH STUDIES: CPT | Mod: GC | Performed by: RADIOLOGY

## 2021-02-02 ENCOUNTER — VIRTUAL VISIT (OUTPATIENT)
Dept: SURGERY | Facility: CLINIC | Age: 71
End: 2021-02-02
Payer: COMMERCIAL

## 2021-02-02 ENCOUNTER — PRE VISIT (OUTPATIENT)
Dept: SURGERY | Facility: CLINIC | Age: 71
End: 2021-02-02

## 2021-02-02 ENCOUNTER — ANESTHESIA EVENT (OUTPATIENT)
Dept: SURGERY | Facility: CLINIC | Age: 71
End: 2021-02-02
Payer: COMMERCIAL

## 2021-02-02 VITALS — WEIGHT: 154 LBS | HEIGHT: 63 IN | BODY MASS INDEX: 27.29 KG/M2

## 2021-02-02 DIAGNOSIS — M17.11 TRICOMPARTMENT OSTEOARTHRITIS OF RIGHT KNEE: ICD-10-CM

## 2021-02-02 DIAGNOSIS — Z01.818 PREOP EXAMINATION: Primary | ICD-10-CM

## 2021-02-02 DIAGNOSIS — D69.1 PLATELET DYSFUNCTION (H): Primary | ICD-10-CM

## 2021-02-02 PROCEDURE — 99215 OFFICE O/P EST HI 40 MIN: CPT | Mod: 95 | Performed by: NURSE PRACTITIONER

## 2021-02-02 RX ORDER — DIPHENHYDRAMINE HCL 50 MG
50 CAPSULE ORAL ONCE
Qty: 1 CAPSULE | Refills: 3 | Status: SHIPPED | OUTPATIENT
Start: 2021-02-02 | End: 2021-02-02

## 2021-02-02 RX ORDER — ACETAMINOPHEN 500 MG
500 TABLET ORAL ONCE
Qty: 1 TABLET | Refills: 3 | Status: SHIPPED | OUTPATIENT
Start: 2021-02-02 | End: 2021-02-02

## 2021-02-02 ASSESSMENT — MIFFLIN-ST. JEOR: SCORE: 1177.67

## 2021-02-02 ASSESSMENT — PAIN SCALES - GENERAL: PAINLEVEL: MILD PAIN (2)

## 2021-02-02 NOTE — PROGRESS NOTES
Maria Luisa is a 70 year old who is being evaluated via a billable video visit.      How would you like to obtain your AVS? [email or web address]  If the video visit is dropped, the invitation should be resent by: Other e-mail: [email or web address]  Will anyone else be joining your video visit? No        HPI     Review of Systems         Physical Exam

## 2021-02-02 NOTE — PATIENT INSTRUCTIONS
Preparing for Your Surgery      Name:  Maria Luisa Garcia   MRN:  [id number]   :  1950   Today's Date:  2021       Arriving for surgery:  Surgery date:  21  Arrival time:  5:30 am    Restrictions due to COVID 19:  One consistent visitor per patient is allowed  No ill visitors  All visitors must wear face mask     parking is available for anyone with mobility limitations or disabilities.  ([place]  24 hours/ 7 days a week; [facility]  7 am- 3:30 pm, Mon- Fri)    Please come to:     [facility] [address]  [address] [place]  [address]    - parking is available in front of [facility] from 5:15AM to 8:00PM. If you prefer, park your car in the Green Lot.    -Proceed to the 3rd floor, check in at the Adult Surgery Waiting Lounge. [phone]    If an escort is needed stop at the Information Desk in the lobby. Inform the information person that you are here for surgery. An escort to the Adult Surgery Waiting Lounge will be provided.        What can I eat or drink?  -  You may eat and drink normally for up to 8 hours before your surgery. (Until 11:30 pm)  -  You may have clear liquids until 2 hours before surgery. (Until 5:30 am)    Examples of clear liquids:  Water  Clear broth  Juices (apple, white grape, white cranberry  and cider) without pulp  Noncarbonated, powder based beverages  (lemonade and Rocky-Aid)  Sodas (Sprite, 7-Up, ginger ale and seltzer)  Coffee or tea (without milk or cream)  Gatorade    -  No Alcohol for at least 24 hours before surgery     Which medicines can I take?    Hold Aspirin for 7 days before surgery.   Hold Multivitamins for 7 days before surgery.  Hold Supplements for 7 days before surgery.  Hold Ibuprofen (Advil, Motrin) for 1 day before surgery.  Hold Naproxen (Aleve) for 4 days before surgery.    -  PLEASE TAKE these medications the day of surgery:  Acetaminophen (Tylenol)    How do I prepare myself?  - Please  take 2 showers before surgery using Scrubcare or Hibiclens soap.    Use this soap only from the neck to your toes.     Leave the soap on your skin for one minute--then rinse thoroughly.      You may use your own shampoo and conditioner; no other hair products.   - Please remove all jewelry and body piercings.  - No lotions, deodorants or fragrance.  - No makeup or fingernail polish.   - Bring your ID and insurance card.    - All patients are required to have a Covid-19 test within 4 days of surgery/procedure.      -Patients will be contacted by the [facility] scheduling team within 1 week of surgery to make an appointment.      - Patients may call the Scheduling team at [phone] if they have not been scheduled within 4 days of  surgery.      ALL PATIENTS GOING HOME THE SAME DAY OF SURGERY ARE REQUIRED TO HAVE A RESPONSIBLE ADULT TO DRIVE AND BE IN ATTENDANCE WITH THEM FOR 24 HOURS FOLLOWING SURGERY     Questions or Concerns:    - For any questions regarding the day of surgery or your hospital stay, please contact the Pre Admission Nursing Office at [phone].       - If you have health changes between today and your surgery please call your surgeon.     - For questions after surgery please call your surgeons office.       Enhanced Recovery After Surgery     This is a team effort, including you, to get you back on your feet, eating and drinking normally and out of the hospital as quickly as possible.  The goals are:    1) NO INFECTIONS and   2) RETURN TO NORMAL DIET    How can we achieve these goals?  1) STAY ACTIVE: Walk every day before your surgery; try to increase the amount every day.  Walk after surgery as much as you can-the nurses will help you.  Walking speeds healing and gets you home quicker, you heal better at home and have less risk of infection.     2) INCENTIVE SPIROMETER: Practice your incentive spirometer 4 times per day with 5 repetitions each time.  Using the incentive spirometer can  strengthen your muscles between your ribs and help you have a strong cough after surgery.  A more effective cough can help prevent problems with your lungs.    3) STAY HYDRATED: Drink clear liquids up until 2 hours before your surgery. We would like you to purchase a drink such as Gatorade or Ensure Clear (not the milkshake type).  Drink this before bedtime and on the way into the hospital, drink between 8-10 ounces or until you feel hydrated.  Keeping well hydrated leads to your veins being plump, you wake up faster, and you are less likely to be nauseated. Start drinking water as soon as you can after surgery and advance to clear liquids and food as tolerated.  IV fluids contain salt, drinking fluids will minimize the amount of IV fluids you need and decrease the amount of salt you get.    The most common reason for the patient to be readmitted is dehydration. Staying hydrated after you go home from the hospital is very important.  Ensure or Ensure Clear are good options to keep you hydrated.     4) PAIN MANAGEMENT: If we minimize the amount of opioids and narcotics, and use regional blocks (which numb the area where your surgery is) along with oral pain medications; you will have less side effects of nausea and constipation. Narcotics can slow down your bowels and cause you to stay in the hospital longer.     Our goal is to keep you comfortable; eating and drinking normally and back home safely.

## 2021-02-02 NOTE — ANESTHESIA PREPROCEDURE EVALUATION
"Anesthesia Pre-Procedure Evaluation    Patient: Maria Luisa Garcia   MRN:     [id number] Gender:   female   Age:    70 year old :      1950        Preoperative Diagnosis: Tricompartment osteoarthritis of both knees [M17.0]   Procedure(s):  Right total knee arthroplasty     LABS:  CBC:   Lab Results   Component Value Date    WBC 6.3 2020    WBC 6.2 2010    HGB 12.3 01/10/2021    HGB 13.4 2021    HCT 47.1 (H) 2020    HCT 43.9 2010     2020     2010     BMP:   Lab Results   Component Value Date     2021     2020    POTASSIUM 4.5 2021    POTASSIUM 3.9 2020    CHLORIDE 105 2021    CHLORIDE 104 2020    CO2 28 2021    CO2 28 2020    BUN 14 2021    BUN 17 2020    CR 0.72 2021    CR 0.77 2020     (H) 2021    GLC 96 2020     COAGS: No results found for: PTT, INR, FIBR  POC:   Lab Results   Component Value Date    BGM 97 2021     OTHER:   Lab Results   Component Value Date    MARI 8.6 2021        Preop Vitals    BP Readings from Last 3 Encounters:   21 (!) 153/68   20 (!) 172/96   19 (!) 178/101    Pulse Readings from Last 3 Encounters:   21 79   20 95   19 99      Resp Readings from Last 3 Encounters:   21 16   20 12   16 16    SpO2 Readings from Last 3 Encounters:   21 100%   20 98%   12/10/14 96%      Temp Readings from Last 1 Encounters:   21 98.6  F (37  C) (Oral)    Ht Readings from Last 1 Encounters:   21 1.6 m (5' 3\")      Wt Readings from Last 1 Encounters:   21 69.9 kg (154 lb)    Estimated body mass index is 27.28 kg/m  as calculated from the following:    Height as of this encounter: 1.6 m (5' 3\").    Weight as of this encounter: 69.9 kg (154 lb).     LDA:  Peripheral IV 21 Right;Dorsal Hand (Active)   Site Assessment WDL 21 0800   Line Status Saline " locked 01/11/21 0800   Phlebitis Scale 0-->no symptoms 01/11/21 0800   Infiltration Scale 0 01/11/21 0800   Infiltration Site Treatment Method  None 01/11/21 0800   If infiltrated, was a Vesicant infusing? No 01/10/21 1000   Number of days: 25       Closed/Suction Drain Left Knee Accordion 10 [place] (Active)   Site Description WDL 01/11/21 1500   Dressing Status Normal: Clean, Dry & Intact 01/11/21 1500   Drainage Appearance Serosanguenous 01/11/21 0800   Status Other (Comment) 01/10/21 0900   Output (ml) 30 ml 01/11/21 1500   Number of days: 25        Past Medical History:   Diagnosis Date     Platelet function defect (H)       Past Surgical History:   Procedure Laterality Date     ARTHROPLASTY KNEE Left 1/8/2021    Procedure: Left total knee arthroplasty;  Surgeon: Walter Rodriguez MD;  Location: [facility] OR     ARTHROSCOPIC RECONSTRUCTION ANTERIOR CRUCIATE LIGAMENT      left     ARTHROSCOPY KNEE      right     BACK SURGERY      lumbar     CARPAL TUNNEL RELEASE RT/LT      bilateral     COLONOSCOPY       REPAIR HAMMER TOE BILATERAL Bilateral 12/10/2014    Procedure: REPAIR HAMMER TOE BILATERAL;  Surgeon: Catrachito Estrella MD;  Location: [facility] OR     WRIST SURGERY      right, fracture repair      Allergies   Allergen Reactions     Cleocin Anaphylaxis     Codeine Nausea and Vomiting     Pt can tolerate one tablet. Has N/V when taking 2 tablets        Anesthesia Evaluation     . Pt has had prior anesthetic. Type: General and Regional.    History of anesthetic complications    respiratory and cardiac arrest secoondary to poor epidural placement.      ROS/MED HX    ENT/Pulmonary:    (-) LASHONDA risk factors and recent URI   Neurologic:  - neg neurologic ROS   (+) - mild neuropathy in the LLE s/p knee replacement.     Cardiovascular:  - neg cardiovascular ROS   (+) -----No previous cardiac testing       METS/Exercise Tolerance:  >4 METS   Hematologic:     (+) previous transfusion reaction, - hives, anaphylaxis (platelet  transfusion), Other Hematologic Disorder: platelet function defect.   (-) history of blood clots   Musculoskeletal:   (+) arthritis, other musculoskeletal- s/p left knee replacement surgical related pain..  RLE pain.,       GI/Hepatic:  - neg GI/hepatic ROS       Renal/Genitourinary:  - neg Renal ROS       Endo:  - neg endo ROS       Psychiatric:  - neg psychiatric ROS    (-) chronic opioid use history   Infectious Disease:  - neg infectious disease ROS    (-) Recent Fever   Malignancy:       Other:    (+) , H/O Chronic Pain,                       PHYSICAL EXAM:   Mental Status/Neuro:    Airway:   Mallampati: II  Mouth/Opening: Full  TM distance: > 6 cm  Neck ROM: Full   Respiratory: Auscultation: CTAB     Resp. Rate: Normal     Resp. Effort: Normal      CV: Rhythm: Regular  Rate: Age appropriate  Heart: Normal Sounds  Edema: None   Comments:                      Assessment:   ASA SCORE: 2            Plan:   Anes. Type:  General   Pre-Medication: None   Induction:  IV (Standard)   Airway: LMA   Access/Monitoring: PIV   Maintenance: Balanced     Postop Plan:   Postop Pain: Opioids  Postop Sedation/Airway: Not planned  Disposition: Inpatient/Admit     PONV Management:   Adult Risk Factors: Female, Postop Opioids   Prevention: Ondansetron             [unfilled]  PAC Discussion and Assessment    ASA Classification: 3  Case is suitable for: [facility] [place]  Anesthetic techniques and relevant risks discussed: GA                  PAC Resident/NP Anesthesia Assessment: Maria Luisa LUNA Person 70 year old female scheduled for a Right total knee arthroplasty on 2/11/21 by Dr. Rodriguez in treatment of tri-compartment OA of the right knee.  PAC referral for risk assessment and optimization for anesthesia with comorbid conditions of: platelet function defect.         Pre-operative considerations:  1.  Cardiac:  Functional status- METS >4.  Prior to her left TKA in January she had been walking 30-45 minutes regularly for exercise. She now is  going to physical therapy 2x weekly and riding the stationary bicycle there for 10-15 minutes at a time.  Intermediate risk surgery with 0.4% risk of major adverse cardiac event.   2.  Pulm:  Airway feasible.  LASHONDA risk: low.    3.  GI:  Risk of PONV score = 3.  If > 2, anti-emetic intervention recommended.   4. Anesthesia:  She reports history of respiratory and cardiac arrest in  during a  due to the epidural being placed to high.    5. Heme:  VTE risk: 0.5%  She has a history of an unspecified platelet function defect.  She also has a history of a significant reaction to a platelet transfusion.  She consulted with Dr. Dotson on 20 and recommendations were written for her right knee arthroplasty on 1/3/21, but the patient notes that she had quite a bit of oozing (she approximates 1000cc) post-op so she has another visit with SHEA Oleary PA-C on 21.  Please see his note for updated recommendations.  It appears that Dr. Rodriguez has already placed pre-op orders for platelets to have available.  Due to her history of platelet transfusion reaction, benadryl and tylenol are also recommended prior to platelet transfusion.  Discussed with SHEA Spencer RN in ortho and she notes that she has placed these orders.  Patient will get T&S done w/in 72 hours prior to surgery since it cannot be extended due to her history of transfusion reaction.   6. Anesthesia:  Due to her platelet function defect spinal anesthesia should be avoided.          **Addendum (20) - copied recommendations from SHEA Oleary PA-C hematology note:    Plan:  The hematological treatment plan for her upcoming right total knee arthroplasty will be the same as the plan that was constructed by Dr. Dotson back on 2020:  Transfuse 1 unit of platelets immediately pre-operatively  Give benadryl 50 mg IV and acetominophen 650 mg as premeds prior to platelet transfusion  If excessive bleeding during, or within 24 hours of procedure, give another 1 unit  of platelets with premeds.   Start apixiban (Eliquis) 2.5 mg po bid (prophylactic dose) 48 hours after surgery (assuming that she has no bleeding complications) and continue for 14 days.   Page Dr. Dotson [phone], or hematology consult service if Dr. Dotson not answering, if there is bleeding or other concerns regarding bleeding/clotting management.       I have verified today that Dr. Rodriguez's team has already ordered the pre-operative platelet transfusion.      Lisbeth Espana DNP, RN, APRN      Reviewed and Signed by PAC Mid-Level Provider/Resident  Mid-Level Provider/Resident: Lisbeth Espana DNP, RN, APRN  Date: 2/2/21  Time: 1430                                RAMON Wilkerson CNP

## 2021-02-02 NOTE — H&P
Pre-Operative H & P           Video-Visit Details    Type of service:  Video Visit    Patient verbally consented to video service today: YES      Video Start Time: 1359  Video End Time (time video stopped): 1416    Originating Location (pt. Location): Home    Distant Location (provider location):  provider's home    Mode of Communication:  Video Conference via Babelverse      CC:  Preoperative exam to assess for increased cardiopulmonary risk while undergoing surgery and anesthesia.    Date of Encounter: 2/2/2021  Primary Care Physician:  No Ref-Primary, Physician  Associated diagnosis: tri-compartment OA of the right knee    HPI  Maria Luisa Garcia is a 70 year old female who presents for pre-operative H & P in preparation for a Right total knee arthroplasty on 2/11/21 by Dr. Rodriguez at [facility].     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  She previously reported that she had been having symptoms for about the last year in both knees.  Her symptoms weren't specifically in the knees, but rather she had been having lower leg pain/aching after walking long distances.  She also notes difficulty with kneeling.  She sought evaluation with orthopedics and was found to have significant bilateral knee OA.  An injection was attempted in the left knee and was helpful in managing her pain, but per patient report, surgery was felt to be the best option due to the severity of the OA.  She has consulted with Dr. Rodriguez, completed the left TKA last month and now is scheduled for the above listed procedure as part of the continued management plan.      History is obtained from the patient and the medical record.     Past Medical History  Past Medical History:   Diagnosis Date     Platelet function defect (H)        Past Surgical History  Past  Surgical History:   Procedure Laterality Date     ARTHROPLASTY KNEE Left 1/8/2021    Procedure: Left total knee arthroplasty;  Surgeon: Walter Rodriguez MD;  Location: [facility] OR     ARTHROSCOPIC RECONSTRUCTION ANTERIOR CRUCIATE LIGAMENT      left     ARTHROSCOPY KNEE      right     BACK SURGERY      lumbar     CARPAL TUNNEL RELEASE RT/LT      bilateral     COLONOSCOPY       REPAIR HAMMER TOE BILATERAL Bilateral 12/10/2014    Procedure: REPAIR HAMMER TOE BILATERAL;  Surgeon: Catrachito Estrella MD;  Location: [facility]     WRIST SURGERY      right, fracture repair       Hx of Blood transfusions/reactions: yes, +reaction to platelet transfusion    Hx of abnormal bleeding or anti-platelet use: platelet function defect    Menstrual history: No LMP recorded. Patient is postmenopausal.:     Steroid use in the last year: none    Personal or FH with difficulty with Anesthesia:  Patient has a history of respiratory and cardiac arrest secondary to improper epidural placement in 1977, but has no family history of anesthesia complications.      Prior to Admission Medications  Current Outpatient Medications   Medication Sig Dispense Refill     acetaminophen (TYLENOL) 325 MG tablet Take 2 tablets (650 mg) by mouth every 4 hours as needed for other (Patient taking differently: Take 500 mg by mouth every 4 hours as needed for other ) 1 Bottle 0     Calcium-Magnesium-Vitamin D (CALCIUM 1200+D3 PO) Take 1 tablet by mouth 2 times daily       celecoxib (CELEBREX) 100 MG capsule Take 1 capsule (100 mg) by mouth 2 times daily (Patient taking differently: Take 100 mg by mouth as needed ) 20 capsule 0     Coenzyme Q10 (COQ10 PO) Take 1 capsule by mouth every morning       Glucosamine-Chondroit-Vit C-Mn (GLUCOSAMINE CHONDR 1500 COMPLX PO) Take by mouth every morning        hydrOXYzine (ATARAX) 25 MG tablet Take 1 tablet (25 mg) by mouth every 6 hours as needed for other (adjuvant pain) 40 tablet 0     MAGNESIUM OXIDE PO Take 500 mg by mouth  every morning        oxyCODONE (ROXICODONE) 5 MG tablet Take 1-2 tablets (5-10 mg) by mouth every 6 hours as needed for severe pain 45 tablet 0       Allergies  Allergies   Allergen Reactions     Cleocin Anaphylaxis     Codeine Nausea and Vomiting     Pt can tolerate one tablet. Has N/V when taking 2 tablets       Social History  Social History     Socioeconomic History     Marital status:      Spouse name: Not on file     Number of children: 2     Years of education: Not on file     Highest education level: Not on file   Occupational History     Occupation: RN,    Social Needs     Financial resource strain: Not on file     Food insecurity     Worry: Not on file     Inability: Not on file     Transportation needs     Medical: Not on file     Non-medical: Not on file   Tobacco Use     Smoking status: Never Smoker     Smokeless tobacco: Never Used   Substance and Sexual Activity     Alcohol use: Yes     Comment: socially     Drug use: No     Sexual activity: Never   Lifestyle     Physical activity     Days per week: Not on file     Minutes per session: Not on file     Stress: Not on file   Relationships     Social connections     Talks on phone: Not on file     Gets together: Not on file     Attends Jain service: Not on file     Active member of club or organization: Not on file     Attends meetings of clubs or organizations: Not on file     Relationship status: Not on file     Intimate partner violence     Fear of current or ex partner: Not on file     Emotionally abused: Not on file     Physically abused: Not on file     Forced sexual activity: Not on file   Other Topics Concern     Parent/sibling w/ CABG, MI or angioplasty before 65F 55M? Not Asked   Social History Narrative     Not on file       Family History  Family History   Problem Relation Age of Onset     Hypertension Mother      Melanoma Mother 84        malignant melanoma of ethmoid sinus     Diabetes Father      Hypertension  "Father      Parkinsonism Father      Hypertension Sister      No Known Problems Brother      Deep Vein Thrombosis No family hx of      Anesthesia Reaction No family hx of              ROS/MED HX  The complete review of systems is negative other than noted in the HPI or here.   ENT/Pulmonary:    (-) LASHONDA risk factors and recent URI   Neurologic:  - neg neurologic ROS   (+) - mild neuropathy in the LLE s/p knee replacement.     Cardiovascular:  - neg cardiovascular ROS   (+) -----No previous cardiac testing       METS/Exercise Tolerance:  >4 METS   Hematologic:     (+) previous transfusion reaction, - hives, anaphylaxis (platelet transfusion), Other Hematologic Disorder: platelet function defect.   (-) history of blood clots   Musculoskeletal:   (+) arthritis, other musculoskeletal- s/p left knee replacement surgical related pain..  RLE pain.,       GI/Hepatic:  - neg GI/hepatic ROS       Renal/Genitourinary:  - neg Renal ROS       Endo:  - neg endo ROS       Psychiatric:  - neg psychiatric ROS    (-) chronic opioid use history   Infectious Disease:  - neg infectious disease ROS    (-) Recent Fever   Malignancy:       Other:    (+) , H/O Chronic Pain,                                     154 lbs 0 oz  5' 3\"   Body mass index is 27.28 kg/m .       Physical Exam  Constitutional: Awake, alert, cooperative, no apparent distress, and appears stated age.  Neurologic: Awake, alert, oriented to name, place and time.   Neuropsychiatric: Calm, cooperative. Normal affect.     Labs: (personally reviewed) -   Component      Latest Ref Rng & Units 2/9/2021   Sodium      133 - 144 mmol/L 136   Potassium      3.4 - 5.3 mmol/L 4.2   Chloride      94 - 109 mmol/L 102   Carbon Dioxide      20 - 32 mmol/L 28   Anion Gap      3 - 14 mmol/L 6   Glucose      70 - 99 mg/dL 93   Urea Nitrogen      7 - 30 mg/dL 19   Creatinine      0.52 - 1.04 mg/dL 0.74   GFR Estimate      >60 mL/min/1.73:m2 80   GFR Estimate If Black      >60 mL/min/1.73:m2 " >90   Calcium      8.5 - 10.1 mg/dL 9.5   WBC      4.0 - 11.0 10e9/L 6.2   RBC Count      3.8 - 5.2 10e12/L 4.93   Hemoglobin      11.7 - 15.7 g/dL 14.4   Hematocrit      35.0 - 47.0 % 46.1   MCV      78 - 100 fl 94   MCH      26.5 - 33.0 pg 29.2   MCHC      31.5 - 36.5 g/dL 31.2 (L)   RDW      10.0 - 15.0 % 13.2   Platelet Count      150 - 450 10e9/L 332           ASSESSMENT and PLAN  Maria Luisa LUNA Person 70 year old female scheduled for a Right total knee arthroplasty on 21 by Dr. Rodriguez in treatment of tri-compartment OA of the right knee.  PAC referral for risk assessment and optimization for anesthesia with comorbid conditions of: platelet function defect.         Pre-operative considerations:  1.  Cardiac:  Functional status- METS >4.  Prior to her left TKA in January she had been walking 30-45 minutes regularly for exercise. She now is going to physical therapy 2x weekly and riding the stationary bicycle there for 10-15 minutes at a time.  Intermediate risk surgery with 0.4% risk of major adverse cardiac event.   2.  Pulm:  Airway feasible.  LASHONDA risk: low.    3.  GI:  Risk of PONV score = 3.  If > 2, anti-emetic intervention recommended.   4. Anesthesia:  She reports history of respiratory and cardiac arrest in  during a  due to the epidural being placed to high.    5. Heme:  VTE risk: 0.5%  She has a history of an unspecified platelet function defect.  She also has a history of a significant reaction to a platelet transfusion.  She consulted with Dr. Dotson on 20 and recommendations were written for her right knee arthroplasty on 1/3/21, but the patient notes that she had quite a bit of oozing (she approximates 1000cc) post-op so she has another visit with SHEA Oleary PA-C on 21.  Please see his note for updated recommendations.  It appears that Dr. Rodriguez has already placed pre-op orders for platelets to have available.  Due to her history of platelet transfusion reaction, benadryl and tylenol  are also recommended prior to platelet transfusion.  Discussed with SHEA Spencer RN in ortho and she notes that she has placed these orders.  Patient will get T&S done w/in 72 hours prior to surgery since it cannot be extended due to her history of transfusion reaction.   6. Anesthesia:  Due to her platelet function defect spinal anesthesia should be avoided.        **Addendum (2/5/20) - copied recommendations from SHEA Oleary PA-C hematology note:    Plan:  The hematological treatment plan for her upcoming right total knee arthroplasty will be the same as the plan that was constructed by Dr. Dotson back on 12/7/2020:  1. Transfuse 1 unit of platelets immediately pre-operatively  2. Give benadryl 50 mg IV and acetominophen 650 mg as premeds prior to platelet transfusion  3. If excessive bleeding during, or within 24 hours of procedure, give another 1 unit of platelets with premeds.   4. Start apixiban (Eliquis) 2.5 mg po bid (prophylactic dose) 48 hours after surgery (assuming that she has no bleeding complications) and continue for 14 days.   5. Page Dr. Dotson [phone], or hematology consult service if Dr. Dotson not answering, if there is bleeding or other concerns regarding bleeding/clotting management.       I have verified today that Dr. Rodriguez's team has already ordered the pre-operative platelet transfusion.         Virtual visit - Please refer to the physical examination documented by the anesthesiologist in the anesthesia record on the day of surgery     Patient is optimized and is acceptable candidate for the proposed procedure.  No further diagnostic evaluation is needed.       Prep time: 10 minutes  Visit time: 17 minutes  Documentation time: 15 minutes  -----------------------------------------------  42 minutes total spend on day of service            Lisbeth Espana DNP, RN, APRN  Preoperative Assessment Center  [facility]  Clinic and Surgery Center  Phone: [phone]  Fax:  [phone]

## 2021-02-04 ENCOUNTER — VIRTUAL VISIT (OUTPATIENT)
Dept: HEMATOLOGY | Facility: CLINIC | Age: 71
End: 2021-02-04
Attending: PHYSICIAN ASSISTANT
Payer: COMMERCIAL

## 2021-02-04 DIAGNOSIS — M17.0 TRICOMPARTMENT OSTEOARTHRITIS OF BOTH KNEES: ICD-10-CM

## 2021-02-04 DIAGNOSIS — D69.1 PLATELET FUNCTION DEFECT (H): Primary | ICD-10-CM

## 2021-02-04 PROCEDURE — 99207 PR CDG-CODE CATEGORY CHANGED: CPT | Performed by: PHYSICIAN ASSISTANT

## 2021-02-04 PROCEDURE — 99203 OFFICE O/P NEW LOW 30 MIN: CPT | Mod: 95 | Performed by: PHYSICIAN ASSISTANT

## 2021-02-04 NOTE — LETTER
[facility]  [address] [place] [address], [place] [address]  Main: [phone], Fax: [phone]    [facility] at: [facility] at [address] [place] [address]    Telephone (Return) Visit Note:    Due to the ongoing COVID-19 outbreak, this visit was conducted by telephone, with the patient's approval.    Patient: Maria Luisa LUNA Person  MRN: [id number]  : 1950  KEVIN: 2021    Reason:  Platelet function defect. Pre-op evaluation (Right total knee arthroplasty).     Clinical History Summary:  Maria Luisa is a 70 year old female with a history of platelet function defect, participates in today's scheduled billable telephone visit for pre-operative hematological evaluation for her upcoming right total knee arthroplasty scheduled on 2021.     Maria Luisa was last seen by Dr. Dotson, staff hematologist here at this clinic back on 2020 for hematological pre-operative evaluation prior to her left total knee arthroplasty on 2021.     Maria Luisa was initially diagnosed with platelet function defect after she had bleeding with minimally invasive spine surgery in around . At the time, she recalls that her surgeon told her that when he tried cauterizing sites, she continued to ooze. She also had a arthroscopic knee surgery and had ongoing oozing from the small incision sites enough that apparently dropped her hemoglobin. She had been seen by Dr. STEPHANY Quinn at [facility] for the bleeding issue. She has had several surgical procedures in which she was given 1 unit of platelet preoperatively because of this defect, including open reduction internal fixation of R distal radial fracture 3/7/10, arthroscopic knee surgery 6/5/10 and bilateral hammertoe correction and R navicular partial excision on 2/10/14. There were no bleeding complications with these procedures. She had a serious reaction (rspiratory distress, after the second time she  "received platelets. Since than she has received diphenhydramine and acetaminophen premeds, and gven leukoreduced platelets.     Labs at [facility] (Scanned in to EPIC) show normal VWD antigen and ristocetin cofactor, normal FVIII, normal FXIII. Platelet aggs show decreased response to low dose ADP, arachadonic acid and epinephrine,  Suggestive of \"aspirin-like effect.\"  She had documented normal INR, PTT, fibrinogen and bleeding time in 3/4/1996.      She had  with her first pregnancy, no bleeding. With her second pregnancy, , she had an issue with anesthesia and had a cardiac arrest, with some bleeding. She does not have any significant bleeding without surgical stress- rare mild epistaxis,  No gum bleeding, melena, hematochezia, hematuria or easy bruising. She did not have menorrhagia when se was still menstruating.      Interim History:  During her visit with Dr. Dotson back in Dec 2020, she had a very explicit plan of managing her platelet defect for her left total knee replacement surgery on 2021. She received one \"dose\" of platelet transfusion with premedications of Acetaminophen and Benadryl prior to her surgery. She also received standard tranexamic acid prior to the surgery. The surgery itself was uncomplicated. Review of the operative note indicates that she had <100 mL of blood loss intraoperatively. A drain was placed and according to the patient that POD #1 and POD #2 had significant drainage of >500 mL each day. Maria Luisa was concern enough that she had made numerous request to the surgical team to give her additional units of platelets. She eventually received a repeat platelet transfusion on 1/10/2021. On 2021, the drain malfunction and dislodged and thus it was removed. On the same day, 2021, she was discharged home. For the following few days, she noted bruising and swelling of the left knee. She recalls that the bruising extended into the upper calf. She " then also was started on apixaban for DVT prophylaxis on 1/14/2021 and continued that for 14 days without further bleeding issues.     Today, she reports that her left knee is doing well. Bruising has completely resolved.     She is currently scheduled to undergo right total knee arthroplasty by Dr. Rodriguez on 2/11/2021.     ROS:  Denies any other bleeding issues. Denies any epistaxis. No oral mucosal bleeding. Denies any hematuria or blood in stools. No shortness of breath. Denies any cough. No fever.     Medication, Allergies and PmHx:  All have been reviewed by this writer in the electronic medical records.    Social History and Family History:  Deferred.    Labs:  Component      Latest Ref Rng & Units 1/9/2021 1/10/2021   Hemoglobin      11.7 - 15.7 g/dL 13.4 12.3       Assessment:  In summary, Maria Luisa is a 70 year old female with a history of aspirin-like platelet function defect, participates in today's scheduled billable telephone visit to discuss hematological treatment for her upcoming right total knee arthroplasty surgery.     She did have some bleeding after her left total knee arthroplasty surgery on 1/8/2021 through the drain that was placed intra-operatively. She did receive an extra dose of platelet transfusion on POD#2 at the time which seems to controlled her bleeding. But the drain dislodged on POD #3 which likely explain why she had some bruising at the surgical site. Because of the bleeding, she was not placed on apixaban until POD #6 and continued this for 14 days without bleeding.     Diagnosis:  1. Platelet function defect.   2. Bilateral knees osteoarthritis. S/P Left TKA back on 1/8/2021. Scheduled to undergo right TKA on 2/11/2021.     Plan:  The hematological treatment plan for her upcoming right total knee arthroplasty will be the same as the plan that was constructed by Dr. Dotson back on 12/7/2020:  1. Transfuse 1 unit of platelets immediately pre-operatively  2. Give benadryl 50 mg IV and  acetominophen 650 mg as premeds prior to platelet transfusion  3. If excessive bleeding during, or within 24 hours of procedure, give another 1 unit of platelets with premeds.   4. Start apixiban (Eliquis) 2.5 mg po bid (prophylactic dose) 48 hours after surgery (assuming that she has no bleeding complications) and continue for 14 days.   5. Page Dr. Dotson [phone], or hematology consult service if Dr. Dotson not answering, if there is bleeding or other concerns regarding bleeding/clotting management.      I have verified today that Dr. Rodriguez's team has already ordered the pre-operative platelet transfusion.     At this time, there is no plans to see the patient back on a routine basis. However, we will see her back if she should need any future surgical or invasive procedures or if she should experience any unusual bleeding issues.     30 min spent on the date of the encounter in chart review, patient visit, review of tests, documentation and/or discussion with other providers about the issues documented above.     Call started: 14:10  Call ended: 14:25      Laurent Oleary PA-C, MPAS  Physician Assistant  [facility].

## 2021-02-04 NOTE — PROGRESS NOTES
[facility]  [address] [place] [address], [place] [address]  Main: [phone], Fax: [phone]    [facility] seen at: [facility] at [address] [place] [address]    Telephone (Return) Visit Note:    Due to the ongoing COVID-19 outbreak, this visit was conducted by telephone, with the patient's approval.    Patient: Maria Luisa LUNA Person  MRN: [id number]  : 1950  KEVIN: 2021    Reason:  Platelet function defect. Pre-op evaluation (Right total knee arthroplasty).     Clinical History Summary:  Maria Luisa is a 70 year old female with a history of platelet function defect, participates in today's scheduled billable telephone visit for pre-operative hematological evaluation for her upcoming right total knee arthroplasty scheduled on 2021.     Maria Luisa was last seen by Dr. Dotson, staff hematologist here at this clinic back on 2020 for hematological pre-operative evaluation prior to her left total knee arthroplasty on 2021.     Maria Luisa was initially diagnosed with platelet function defect after she had bleeding with minimally invasive spine surgery in around . At the time, she recalls that her surgeon told her that when he tried cauterizing sites, she continued to ooze. She also had a arthroscopic knee surgery and had ongoing oozing from the small incision sites enough that apparently dropped her hemoglobin. She had been seen by Dr. STEPHANY Quinn at [facility] for the bleeding issue. She has had several surgical procedures in which she was given 1 unit of platelet preoperatively because of this defect, including open reduction internal fixation of R distal radial fracture 3/7/10, arthroscopic knee surgery 6/5/10 and bilateral hammertoe correction and R navicular partial excision on 2/10/14. There were no bleeding complications with these procedures. She had a serious reaction (rspiratory distress, after the second time she received  "platelets. Since than she has received diphenhydramine and acetaminophen premeds, and gven leukoreduced platelets.     Labs at [facility] (Scanned in to EPIC) show normal VWD antigen and ristocetin cofactor, normal FVIII, normal FXIII. Platelet aggs show decreased response to low dose ADP, arachadonic acid and epinephrine,  Suggestive of \"aspirin-like effect.\"  She had documented normal INR, PTT, fibrinogen and bleeding time in 3/4/1996.      She had  with her first pregnancy, no bleeding. With her second pregnancy, , she had an issue with anesthesia and had a cardiac arrest, with some bleeding. She does not have any significant bleeding without surgical stress- rare mild epistaxis,  No gum bleeding, melena, hematochezia, hematuria or easy bruising. She did not have menorrhagia when [facility] was still menstruating.      Interim History:  During her visit with Dr. Dotson back in Dec 2020, she had a very explicit plan of managing her platelet defect for her left total knee replacement surgery on 2021. She received one \"dose\" of platelet transfusion with premedications of Acetaminophen and Benadryl prior to her surgery. She also received standard tranexamic acid prior to the surgery. The surgery itself was uncomplicated. Review of the operative note indicates that she had <100 mL of blood loss intraoperatively. A drain was placed and according to the patient that POD #1 and POD #2 had significant drainage of >500 mL each day. Maria Luisa was concern enough that she had made numerous request to the surgical team to give her additional units of platelets. She eventually received a repeat platelet transfusion on 1/10/2021. On 2021, the drain malfunction and dislodged and thus it was removed. On the same day, 2021, she was discharged home. For the following few days, she noted bruising and swelling of the left knee. She recalls that the bruising extended into the upper calf. She then " also was started on apixaban for DVT prophylaxis on 1/14/2021 and continued that for 14 days without further bleeding issues.     Today, she reports that her left knee is doing well. Bruising has completely resolved.     She is currently scheduled to undergo right total knee arthroplasty by Dr. Rodriguez on 2/11/2021.     ROS:  Denies any other bleeding issues. Denies any epistaxis. No oral mucosal bleeding. Denies any hematuria or blood in stools. No shortness of breath. Denies any cough. No fever.     Medication, Allergies and PmHx:  All have been reviewed by this writer in the electronic medical records.    Social History and Family History:  Deferred.    Labs:  Component      Latest Ref Rng & Units 1/9/2021 1/10/2021   Hemoglobin      11.7 - 15.7 g/dL 13.4 12.3       Assessment:  In summary, Maria Luisa is a 70 year old female with a history of aspirin-like platelet function defect, participates in today's scheduled billable telephone visit to discuss hematological treatment for her upcoming right total knee arthroplasty surgery.     She did have some bleeding after her left total knee arthroplasty surgery on 1/8/2021 through the drain that was placed intra-operatively. She did receive an extra dose of platelet transfusion on POD#2 at the time which seems to controlled her bleeding. But the drain dislodged on POD #3 which likely explain why she had some bruising at the surgical site. Because of the bleeding, she was not placed on apixaban until POD #6 and continued this for 14 days without bleeding.     Diagnosis:  1. Platelet function defect.   2. Bilateral knees osteoarthritis. S/P Left TKA back on 1/8/2021. Scheduled to undergo right TKA on 2/11/2021.     Plan:  The hematological treatment plan for her upcoming right total knee arthroplasty will be the same as the plan that was constructed by Dr. Dotson back on 12/7/2020:  1. Transfuse 1 unit of platelets immediately pre-operatively  2. Give benadryl 50 mg IV and  acetominophen 650 mg as premeds prior to platelet transfusion  3. If excessive bleeding during, or within 24 hours of procedure, give another 1 unit of platelets with premeds.   4. Start apixiban (Eliquis) 2.5 mg po bid (prophylactic dose) 48 hours after surgery (assuming that she has no bleeding complications) and continue for 14 days.   5. Page Dr. Dotson [phone], or hematology consult service if Dr. Dotson not answering, if there is bleeding or other concerns regarding bleeding/clotting management.      I have verified today that Dr. Rodriguez's team has already ordered the pre-operative platelet transfusion.     At this time, there is no plans to see the patient back on a routine basis. However, we will see her back if she should need any future surgical or invasive procedures or if she should experience any unusual bleeding issues.     30 min spent on the date of the encounter in chart review, patient visit, review of tests, documentation and/or discussion with other providers about the issues documented above.     Call started: 14:10  Call ended: 14:25      Laurent Oleary PA-C, MPAS  Physician Assistant  [facility].

## 2021-02-04 NOTE — PATIENT INSTRUCTIONS
Maria Luisa,    It was nice to talk to you via phone during our telephone visit today.    Below is what we have discussed:  1. Will have you proceed with the upcoming right total knee arthroplasty surgery with the same plan as what Dr. Dotson has outlined back in Dec 2020.   2. Please call us at [phone] and ask to speak to a nursing staff if you should have any further questions or concerns or if you need any future surgical or invasive procedures.  3. We will plan on seeing you back on an as needed basis.     Thank you once again in choosing our clinic as part of your healthcare plan.      Laurent Oleary PA-C, MPAS  Physician Assistant  [facility].

## 2021-02-09 ENCOUNTER — APPOINTMENT (OUTPATIENT)
Dept: LAB | Facility: CLINIC | Age: 71
End: 2021-02-09
Payer: COMMERCIAL

## 2021-02-09 DIAGNOSIS — Z11.59 ENCOUNTER FOR SCREENING FOR OTHER VIRAL DISEASES: ICD-10-CM

## 2021-02-09 DIAGNOSIS — Z01.818 PREOP EXAMINATION: ICD-10-CM

## 2021-02-09 LAB
ABO + RH BLD: NORMAL
ABO + RH BLD: NORMAL
ANION GAP SERPL CALCULATED.3IONS-SCNC: 6 MMOL/L (ref 3–14)
BLD GP AB SCN SERPL QL: NORMAL
BLOOD BANK CMNT PATIENT-IMP: NORMAL
BUN SERPL-MCNC: 19 MG/DL (ref 7–30)
CALCIUM SERPL-MCNC: 9.5 MG/DL (ref 8.5–10.1)
CHLORIDE SERPL-SCNC: 102 MMOL/L (ref 94–109)
CO2 SERPL-SCNC: 28 MMOL/L (ref 20–32)
CREAT SERPL-MCNC: 0.74 MG/DL (ref 0.52–1.04)
ERYTHROCYTE [DISTWIDTH] IN BLOOD BY AUTOMATED COUNT: 13.2 % (ref 10–15)
GFR SERPL CREATININE-BSD FRML MDRD: 80 ML/MIN/{1.73_M2}
GLUCOSE SERPL-MCNC: 93 MG/DL (ref 70–99)
HCT VFR BLD AUTO: 46.1 % (ref 35–47)
HGB BLD-MCNC: 14.4 G/DL (ref 11.7–15.7)
LABORATORY COMMENT REPORT: NORMAL
MCH RBC QN AUTO: 29.2 PG (ref 26.5–33)
MCHC RBC AUTO-ENTMCNC: 31.2 G/DL (ref 31.5–36.5)
MCV RBC AUTO: 94 FL (ref 78–100)
PLATELET # BLD AUTO: 332 10E9/L (ref 150–450)
POTASSIUM SERPL-SCNC: 4.2 MMOL/L (ref 3.4–5.3)
RBC # BLD AUTO: 4.93 10E12/L (ref 3.8–5.2)
SARS-COV-2 RNA RESP QL NAA+PROBE: NEGATIVE
SARS-COV-2 RNA RESP QL NAA+PROBE: NORMAL
SODIUM SERPL-SCNC: 136 MMOL/L (ref 133–144)
SPECIMEN EXP DATE BLD: NORMAL
SPECIMEN SOURCE: NORMAL
SPECIMEN SOURCE: NORMAL
WBC # BLD AUTO: 6.2 10E9/L (ref 4–11)

## 2021-02-09 PROCEDURE — 86900 BLOOD TYPING SEROLOGIC ABO: CPT | Performed by: PATHOLOGY

## 2021-02-09 PROCEDURE — 86850 RBC ANTIBODY SCREEN: CPT | Performed by: PATHOLOGY

## 2021-02-09 PROCEDURE — 85027 COMPLETE CBC AUTOMATED: CPT | Performed by: PATHOLOGY

## 2021-02-09 PROCEDURE — 80048 BASIC METABOLIC PNL TOTAL CA: CPT | Performed by: PATHOLOGY

## 2021-02-09 PROCEDURE — U0003 INFECTIOUS AGENT DETECTION BY NUCLEIC ACID (DNA OR RNA); SEVERE ACUTE RESPIRATORY SYNDROME CORONAVIRUS 2 (SARS-COV-2) (CORONAVIRUS DISEASE [COVID-19]), AMPLIFIED PROBE TECHNIQUE, MAKING USE OF HIGH THROUGHPUT TECHNOLOGIES AS DESCRIBED BY CMS-2020-01-R: HCPCS | Mod: 90 | Performed by: PATHOLOGY

## 2021-02-09 PROCEDURE — 36415 COLL VENOUS BLD VENIPUNCTURE: CPT | Performed by: PATHOLOGY

## 2021-02-09 PROCEDURE — 86901 BLOOD TYPING SEROLOGIC RH(D): CPT | Performed by: PATHOLOGY

## 2021-02-09 PROCEDURE — U0005 INFEC AGEN DETEC AMPLI PROBE: HCPCS | Mod: 90 | Performed by: PATHOLOGY

## 2021-02-09 NOTE — RESULT ENCOUNTER NOTE
Chacorta Glass,    Your test results are attached.  Your labs all look good for surgery.        Lisbeth Espana DNP, RN, ANP-C

## 2021-02-11 ENCOUNTER — ANESTHESIA (OUTPATIENT)
Dept: SURGERY | Facility: CLINIC | Age: 71
End: 2021-02-11
Payer: COMMERCIAL

## 2021-02-11 ENCOUNTER — HOSPITAL ENCOUNTER (OUTPATIENT)
Facility: CLINIC | Age: 71
Discharge: HOME | End: 2021-02-13
Attending: ORTHOPAEDIC SURGERY | Admitting: ORTHOPAEDIC SURGERY
Payer: COMMERCIAL

## 2021-02-11 ENCOUNTER — APPOINTMENT (OUTPATIENT)
Dept: PHYSICAL THERAPY | Facility: CLINIC | Age: 71
End: 2021-02-11
Attending: ORTHOPAEDIC SURGERY
Payer: COMMERCIAL

## 2021-02-11 ENCOUNTER — APPOINTMENT (OUTPATIENT)
Dept: GENERAL RADIOLOGY | Facility: CLINIC | Age: 71
End: 2021-02-11
Attending: PHYSICIAN ASSISTANT
Payer: COMMERCIAL

## 2021-02-11 DIAGNOSIS — M17.0 TRICOMPARTMENT OSTEOARTHRITIS OF BOTH KNEES: ICD-10-CM

## 2021-02-11 LAB
BLD PROD TYP BPU: NORMAL
BLD PROD TYP BPU: NORMAL
BLD UNIT ID BPU: 0
BLD UNIT ID BPU: 0
BLOOD PRODUCT CODE: NORMAL
BLOOD PRODUCT CODE: NORMAL
BPU ID: NORMAL
BPU ID: NORMAL
GLUCOSE BLDC GLUCOMTR-MCNC: 109 MG/DL (ref 70–99)
TRANSFUSION STATUS PATIENT QL: NORMAL

## 2021-02-11 PROCEDURE — 250N000011 HC RX IP 250 OP 636: Performed by: ANESTHESIOLOGY

## 2021-02-11 PROCEDURE — 97110 THERAPEUTIC EXERCISES: CPT | Mod: GP

## 2021-02-11 PROCEDURE — 250N000009 HC RX 250: Performed by: ORTHOPAEDIC SURGERY

## 2021-02-11 PROCEDURE — 999N000065 XR KNEE PORT RT 1/2 VW: Mod: RT

## 2021-02-11 PROCEDURE — 999N001017 HC STATISTIC GLUCOSE BY METER IP

## 2021-02-11 PROCEDURE — 250N000013 HC RX MED GY IP 250 OP 250 PS 637: Performed by: ORTHOPAEDIC SURGERY

## 2021-02-11 PROCEDURE — P9037 PLATE PHERES LEUKOREDU IRRAD: HCPCS | Performed by: INTERNAL MEDICINE

## 2021-02-11 PROCEDURE — 258N000003 HC RX IP 258 OP 636: Performed by: NURSE ANESTHETIST, CERTIFIED REGISTERED

## 2021-02-11 PROCEDURE — 258N000001 HC RX 258: Performed by: ORTHOPAEDIC SURGERY

## 2021-02-11 PROCEDURE — 73560 X-RAY EXAM OF KNEE 1 OR 2: CPT | Mod: 26 | Performed by: RADIOLOGY

## 2021-02-11 PROCEDURE — P9037 PLATE PHERES LEUKOREDU IRRAD: HCPCS | Performed by: NURSE PRACTITIONER

## 2021-02-11 PROCEDURE — C1713 ANCHOR/SCREW BN/BN,TIS/BN: HCPCS | Performed by: ORTHOPAEDIC SURGERY

## 2021-02-11 PROCEDURE — 250N000025 HC SEVOFLURANE, PER MIN: Performed by: ORTHOPAEDIC SURGERY

## 2021-02-11 PROCEDURE — C1776 JOINT DEVICE (IMPLANTABLE): HCPCS | Performed by: ORTHOPAEDIC SURGERY

## 2021-02-11 PROCEDURE — 250N000013 HC RX MED GY IP 250 OP 250 PS 637: Performed by: PHYSICIAN ASSISTANT

## 2021-02-11 PROCEDURE — 710N000010 HC RECOVERY PHASE 1, LEVEL 2, PER MIN: Performed by: ORTHOPAEDIC SURGERY

## 2021-02-11 PROCEDURE — 271N000001 HC OR GENERAL SUPPLY NON-STERILE: Performed by: ORTHOPAEDIC SURGERY

## 2021-02-11 PROCEDURE — 36430 TRANSFUSION BLD/BLD COMPNT: CPT

## 2021-02-11 PROCEDURE — 250N000009 HC RX 250: Performed by: ANESTHESIOLOGY

## 2021-02-11 PROCEDURE — 258N000003 HC RX IP 258 OP 636: Performed by: ANESTHESIOLOGY

## 2021-02-11 PROCEDURE — 360N000077 HC SURGERY LEVEL 4, PER MIN: Performed by: ORTHOPAEDIC SURGERY

## 2021-02-11 PROCEDURE — 97161 PT EVAL LOW COMPLEX 20 MIN: CPT | Mod: GP

## 2021-02-11 PROCEDURE — 250N000011 HC RX IP 250 OP 636: Performed by: ORTHOPAEDIC SURGERY

## 2021-02-11 PROCEDURE — 258N000003 HC RX IP 258 OP 636: Performed by: PHYSICIAN ASSISTANT

## 2021-02-11 PROCEDURE — 258N000003 HC RX IP 258 OP 636: Performed by: ORTHOPAEDIC SURGERY

## 2021-02-11 PROCEDURE — 97530 THERAPEUTIC ACTIVITIES: CPT | Mod: GP

## 2021-02-11 PROCEDURE — 99213 OFFICE O/P EST LOW 20 MIN: CPT | Performed by: INTERNAL MEDICINE

## 2021-02-11 PROCEDURE — 250N000009 HC RX 250: Performed by: NURSE ANESTHETIST, CERTIFIED REGISTERED

## 2021-02-11 PROCEDURE — 250N000011 HC RX IP 250 OP 636: Performed by: NURSE ANESTHETIST, CERTIFIED REGISTERED

## 2021-02-11 PROCEDURE — 272N000001 HC OR GENERAL SUPPLY STERILE: Performed by: ORTHOPAEDIC SURGERY

## 2021-02-11 PROCEDURE — 250N000011 HC RX IP 250 OP 636: Performed by: PHYSICIAN ASSISTANT

## 2021-02-11 PROCEDURE — 258N000003 HC RX IP 258 OP 636

## 2021-02-11 PROCEDURE — 278N000051 HC OR IMPLANT GENERAL: Performed by: ORTHOPAEDIC SURGERY

## 2021-02-11 PROCEDURE — 999N000141 HC STATISTIC PRE-PROCEDURE NURSING ASSESSMENT: Performed by: ORTHOPAEDIC SURGERY

## 2021-02-11 PROCEDURE — 250N000013 HC RX MED GY IP 250 OP 250 PS 637: Performed by: INTERNAL MEDICINE

## 2021-02-11 PROCEDURE — 370N000017 HC ANESTHESIA TECHNICAL FEE, PER MIN: Performed by: ORTHOPAEDIC SURGERY

## 2021-02-11 DEVICE — PATELLA
Type: IMPLANTABLE DEVICE | Site: KNEE | Status: FUNCTIONAL
Brand: TRIATHLON

## 2021-02-11 DEVICE — TIBIAL BEARING INSERT - CS
Type: IMPLANTABLE DEVICE | Site: KNEE | Status: FUNCTIONAL
Brand: TRIATHLON

## 2021-02-11 DEVICE — CRUCIATE RETAINING FEMORAL
Type: IMPLANTABLE DEVICE | Site: KNEE | Status: FUNCTIONAL
Brand: TRIATHLON

## 2021-02-11 DEVICE — UNIVERSAL TIBIAL BASEPLATE
Type: IMPLANTABLE DEVICE | Site: KNEE | Status: FUNCTIONAL
Brand: TRIATHLON

## 2021-02-11 DEVICE — BONE CEMENT SIMPLEX W/TOBRAMYCIN 6197-9-001: Type: IMPLANTABLE DEVICE | Site: KNEE | Status: FUNCTIONAL

## 2021-02-11 RX ORDER — ACETAMINOPHEN 325 MG/1
650 TABLET ORAL EVERY 4 HOURS PRN
Status: DISCONTINUED | OUTPATIENT
Start: 2021-02-14 | End: 2021-02-11

## 2021-02-11 RX ORDER — SODIUM CHLORIDE 9 MG/ML
INJECTION, SOLUTION INTRAVENOUS
Status: COMPLETED
Start: 2021-02-11 | End: 2021-02-11

## 2021-02-11 RX ORDER — FENTANYL CITRATE-0.9 % NACL/PF 10 MCG/ML
PLASTIC BAG, INJECTION (ML) INTRAVENOUS CONTINUOUS PRN
Status: DISCONTINUED | OUTPATIENT
Start: 2021-02-11 | End: 2021-02-11

## 2021-02-11 RX ORDER — SODIUM CHLORIDE, SODIUM LACTATE, POTASSIUM CHLORIDE, CALCIUM CHLORIDE 600; 310; 30; 20 MG/100ML; MG/100ML; MG/100ML; MG/100ML
INJECTION, SOLUTION INTRAVENOUS CONTINUOUS
Status: DISCONTINUED | OUTPATIENT
Start: 2021-02-11 | End: 2021-02-11 | Stop reason: HOSPADM

## 2021-02-11 RX ORDER — ACETAMINOPHEN 500 MG
500 TABLET ORAL EVERY 8 HOURS PRN
Status: ON HOLD | COMMUNITY
End: 2021-02-12

## 2021-02-11 RX ORDER — BUPIVACAINE HYDROCHLORIDE AND EPINEPHRINE 5; 5 MG/ML; UG/ML
INJECTION, SOLUTION PERINEURAL PRN
Status: DISCONTINUED | OUTPATIENT
Start: 2021-02-11 | End: 2021-02-11 | Stop reason: HOSPADM

## 2021-02-11 RX ORDER — NALOXONE HYDROCHLORIDE 0.4 MG/ML
0.4 INJECTION, SOLUTION INTRAMUSCULAR; INTRAVENOUS; SUBCUTANEOUS
Status: DISCONTINUED | OUTPATIENT
Start: 2021-02-11 | End: 2021-02-13 | Stop reason: HOSPADM

## 2021-02-11 RX ORDER — ERGOCALCIFEROL 1.25 MG/1
50000 CAPSULE ORAL
Status: ON HOLD | COMMUNITY
End: 2021-02-11

## 2021-02-11 RX ORDER — POLYETHYLENE GLYCOL 3350 17 G/17G
17 POWDER, FOR SOLUTION ORAL DAILY
Status: DISCONTINUED | OUTPATIENT
Start: 2021-02-12 | End: 2021-02-13 | Stop reason: HOSPADM

## 2021-02-11 RX ORDER — CEFAZOLIN SODIUM 2 G/100ML
2 INJECTION, SOLUTION INTRAVENOUS
Status: COMPLETED | OUTPATIENT
Start: 2021-02-11 | End: 2021-02-11

## 2021-02-11 RX ORDER — ONDANSETRON 4 MG/1
4 TABLET, ORALLY DISINTEGRATING ORAL EVERY 6 HOURS PRN
Status: DISCONTINUED | OUTPATIENT
Start: 2021-02-11 | End: 2021-02-13 | Stop reason: HOSPADM

## 2021-02-11 RX ORDER — NALOXONE HYDROCHLORIDE 0.4 MG/ML
0.2 INJECTION, SOLUTION INTRAMUSCULAR; INTRAVENOUS; SUBCUTANEOUS
Status: DISCONTINUED | OUTPATIENT
Start: 2021-02-11 | End: 2021-02-11 | Stop reason: HOSPADM

## 2021-02-11 RX ORDER — PROCHLORPERAZINE MALEATE 5 MG
5 TABLET ORAL EVERY 6 HOURS PRN
Status: DISCONTINUED | OUTPATIENT
Start: 2021-02-11 | End: 2021-02-13 | Stop reason: HOSPADM

## 2021-02-11 RX ORDER — PROPOFOL 10 MG/ML
INJECTION, EMULSION INTRAVENOUS PRN
Status: DISCONTINUED | OUTPATIENT
Start: 2021-02-11 | End: 2021-02-11

## 2021-02-11 RX ORDER — DOCUSATE SODIUM 100 MG/1
100 CAPSULE, LIQUID FILLED ORAL 2 TIMES DAILY
Status: DISCONTINUED | OUTPATIENT
Start: 2021-02-11 | End: 2021-02-13 | Stop reason: HOSPADM

## 2021-02-11 RX ORDER — HYDROMORPHONE HYDROCHLORIDE 1 MG/ML
0.4 INJECTION, SOLUTION INTRAMUSCULAR; INTRAVENOUS; SUBCUTANEOUS
Status: DISCONTINUED | OUTPATIENT
Start: 2021-02-11 | End: 2021-02-13 | Stop reason: HOSPADM

## 2021-02-11 RX ORDER — NALOXONE HYDROCHLORIDE 0.4 MG/ML
0.2 INJECTION, SOLUTION INTRAMUSCULAR; INTRAVENOUS; SUBCUTANEOUS
Status: DISCONTINUED | OUTPATIENT
Start: 2021-02-11 | End: 2021-02-13 | Stop reason: HOSPADM

## 2021-02-11 RX ORDER — NALOXONE HYDROCHLORIDE 0.4 MG/ML
0.4 INJECTION, SOLUTION INTRAMUSCULAR; INTRAVENOUS; SUBCUTANEOUS
Status: DISCONTINUED | OUTPATIENT
Start: 2021-02-11 | End: 2021-02-11 | Stop reason: HOSPADM

## 2021-02-11 RX ORDER — FENTANYL CITRATE 50 UG/ML
25-50 INJECTION, SOLUTION INTRAMUSCULAR; INTRAVENOUS
Status: DISCONTINUED | OUTPATIENT
Start: 2021-02-11 | End: 2021-02-11 | Stop reason: HOSPADM

## 2021-02-11 RX ORDER — ACETAMINOPHEN 325 MG/1
975 TABLET ORAL ONCE
Status: COMPLETED | OUTPATIENT
Start: 2021-02-11 | End: 2021-02-11

## 2021-02-11 RX ORDER — CEFAZOLIN SODIUM 1 G/3ML
1 INJECTION, POWDER, FOR SOLUTION INTRAMUSCULAR; INTRAVENOUS SEE ADMIN INSTRUCTIONS
Status: DISCONTINUED | OUTPATIENT
Start: 2021-02-11 | End: 2021-02-11 | Stop reason: HOSPADM

## 2021-02-11 RX ORDER — DEXAMETHASONE SODIUM PHOSPHATE 10 MG/ML
INJECTION, SOLUTION INTRAMUSCULAR; INTRAVENOUS PRN
Status: DISCONTINUED | OUTPATIENT
Start: 2021-02-11 | End: 2021-02-11

## 2021-02-11 RX ORDER — FENTANYL CITRATE 50 UG/ML
INJECTION, SOLUTION INTRAMUSCULAR; INTRAVENOUS PRN
Status: DISCONTINUED | OUTPATIENT
Start: 2021-02-11 | End: 2021-02-11

## 2021-02-11 RX ORDER — ACETAMINOPHEN 325 MG/1
975 TABLET ORAL ONCE
Status: DISCONTINUED | OUTPATIENT
Start: 2021-02-11 | End: 2021-02-11 | Stop reason: HOSPADM

## 2021-02-11 RX ORDER — LABETALOL HYDROCHLORIDE 5 MG/ML
5 INJECTION, SOLUTION INTRAVENOUS
Status: DISCONTINUED | OUTPATIENT
Start: 2021-02-11 | End: 2021-02-11 | Stop reason: HOSPADM

## 2021-02-11 RX ORDER — ONDANSETRON 2 MG/ML
4 INJECTION INTRAMUSCULAR; INTRAVENOUS EVERY 30 MIN PRN
Status: DISCONTINUED | OUTPATIENT
Start: 2021-02-11 | End: 2021-02-11 | Stop reason: HOSPADM

## 2021-02-11 RX ORDER — ACETAMINOPHEN 325 MG/1
650 TABLET ORAL CONTINUOUS PRN
Status: DISCONTINUED | OUTPATIENT
Start: 2021-02-11 | End: 2021-02-13 | Stop reason: HOSPADM

## 2021-02-11 RX ORDER — AMOXICILLIN 250 MG
1 CAPSULE ORAL 2 TIMES DAILY
Status: DISCONTINUED | OUTPATIENT
Start: 2021-02-11 | End: 2021-02-13 | Stop reason: HOSPADM

## 2021-02-11 RX ORDER — LIDOCAINE 40 MG/G
CREAM TOPICAL
Status: DISCONTINUED | OUTPATIENT
Start: 2021-02-11 | End: 2021-02-13 | Stop reason: HOSPADM

## 2021-02-11 RX ORDER — LIDOCAINE HYDROCHLORIDE 20 MG/ML
INJECTION, SOLUTION INFILTRATION; PERINEURAL PRN
Status: DISCONTINUED | OUTPATIENT
Start: 2021-02-11 | End: 2021-02-11

## 2021-02-11 RX ORDER — TRANEXAMIC ACID 650 MG/1
1950 TABLET ORAL ONCE
Status: COMPLETED | OUTPATIENT
Start: 2021-02-11 | End: 2021-02-11

## 2021-02-11 RX ORDER — DIPHENHYDRAMINE HYDROCHLORIDE 50 MG/ML
50 INJECTION INTRAMUSCULAR; INTRAVENOUS ONCE
Status: COMPLETED | OUTPATIENT
Start: 2021-02-11 | End: 2021-02-11

## 2021-02-11 RX ORDER — ONDANSETRON 2 MG/ML
INJECTION INTRAMUSCULAR; INTRAVENOUS PRN
Status: DISCONTINUED | OUTPATIENT
Start: 2021-02-11 | End: 2021-02-11

## 2021-02-11 RX ORDER — BISACODYL 10 MG
10 SUPPOSITORY, RECTAL RECTAL DAILY PRN
Status: DISCONTINUED | OUTPATIENT
Start: 2021-02-11 | End: 2021-02-13 | Stop reason: HOSPADM

## 2021-02-11 RX ORDER — FLUMAZENIL 0.1 MG/ML
0.2 INJECTION, SOLUTION INTRAVENOUS
Status: DISCONTINUED | OUTPATIENT
Start: 2021-02-11 | End: 2021-02-11 | Stop reason: HOSPADM

## 2021-02-11 RX ORDER — DIPHENHYDRAMINE HCL 25 MG
50 CAPSULE ORAL ONCE
Status: DISCONTINUED | OUTPATIENT
Start: 2021-02-11 | End: 2021-02-11 | Stop reason: CLARIF

## 2021-02-11 RX ORDER — DIPHENHYDRAMINE HYDROCHLORIDE 50 MG/ML
50 INJECTION INTRAMUSCULAR; INTRAVENOUS CONTINUOUS PRN
Status: DISCONTINUED | OUTPATIENT
Start: 2021-02-11 | End: 2021-02-13 | Stop reason: HOSPADM

## 2021-02-11 RX ORDER — BUPIVACAINE HYDROCHLORIDE 2.5 MG/ML
INJECTION, SOLUTION EPIDURAL; INFILTRATION; INTRACAUDAL PRN
Status: DISCONTINUED | OUTPATIENT
Start: 2021-02-11 | End: 2021-02-11

## 2021-02-11 RX ORDER — ONDANSETRON 2 MG/ML
4 INJECTION INTRAMUSCULAR; INTRAVENOUS EVERY 6 HOURS PRN
Status: DISCONTINUED | OUTPATIENT
Start: 2021-02-11 | End: 2021-02-13 | Stop reason: HOSPADM

## 2021-02-11 RX ORDER — OXYCODONE HYDROCHLORIDE 5 MG/1
5 TABLET ORAL
Status: DISCONTINUED | OUTPATIENT
Start: 2021-02-11 | End: 2021-02-12

## 2021-02-11 RX ORDER — SODIUM CHLORIDE, SODIUM LACTATE, POTASSIUM CHLORIDE, CALCIUM CHLORIDE 600; 310; 30; 20 MG/100ML; MG/100ML; MG/100ML; MG/100ML
INJECTION, SOLUTION INTRAVENOUS CONTINUOUS PRN
Status: DISCONTINUED | OUTPATIENT
Start: 2021-02-11 | End: 2021-02-11

## 2021-02-11 RX ORDER — FENTANYL CITRATE-0.9 % NACL/PF 10 MCG/ML
PLASTIC BAG, INJECTION (ML) INTRAVENOUS PRN
Status: DISCONTINUED | OUTPATIENT
Start: 2021-02-11 | End: 2021-02-11

## 2021-02-11 RX ORDER — CEFAZOLIN SODIUM 1 G/3ML
1 INJECTION, POWDER, FOR SOLUTION INTRAMUSCULAR; INTRAVENOUS EVERY 8 HOURS
Status: COMPLETED | OUTPATIENT
Start: 2021-02-11 | End: 2021-02-12

## 2021-02-11 RX ORDER — ONDANSETRON 4 MG/1
4 TABLET, ORALLY DISINTEGRATING ORAL EVERY 30 MIN PRN
Status: DISCONTINUED | OUTPATIENT
Start: 2021-02-11 | End: 2021-02-11 | Stop reason: HOSPADM

## 2021-02-11 RX ORDER — ACETAMINOPHEN 325 MG/1
975 TABLET ORAL EVERY 8 HOURS
Status: DISCONTINUED | OUTPATIENT
Start: 2021-02-11 | End: 2021-02-11

## 2021-02-11 RX ORDER — HYDROMORPHONE HYDROCHLORIDE 1 MG/ML
0.2 INJECTION, SOLUTION INTRAMUSCULAR; INTRAVENOUS; SUBCUTANEOUS
Status: DISCONTINUED | OUTPATIENT
Start: 2021-02-11 | End: 2021-02-13 | Stop reason: HOSPADM

## 2021-02-11 RX ORDER — PHENOL 1.4 %
600 AEROSOL, SPRAY (ML) MUCOUS MEMBRANE 2 TIMES DAILY WITH MEALS
COMMUNITY

## 2021-02-11 RX ORDER — MAGNESIUM HYDROXIDE 1200 MG/15ML
LIQUID ORAL PRN
Status: DISCONTINUED | OUTPATIENT
Start: 2021-02-11 | End: 2021-02-11 | Stop reason: HOSPADM

## 2021-02-11 RX ORDER — LANOLIN ALCOHOL/MO/W.PET/CERES
400 CREAM (GRAM) TOPICAL DAILY
COMMUNITY

## 2021-02-11 RX ORDER — CELECOXIB 200 MG/1
400 CAPSULE ORAL ONCE
Status: COMPLETED | OUTPATIENT
Start: 2021-02-11 | End: 2021-02-11

## 2021-02-11 RX ORDER — SODIUM CHLORIDE, SODIUM LACTATE, POTASSIUM CHLORIDE, CALCIUM CHLORIDE 600; 310; 30; 20 MG/100ML; MG/100ML; MG/100ML; MG/100ML
INJECTION, SOLUTION INTRAVENOUS CONTINUOUS
Status: DISCONTINUED | OUTPATIENT
Start: 2021-02-11 | End: 2021-02-13

## 2021-02-11 RX ORDER — DEXAMETHASONE SODIUM PHOSPHATE 4 MG/ML
INJECTION, SOLUTION INTRA-ARTICULAR; INTRALESIONAL; INTRAMUSCULAR; INTRAVENOUS; SOFT TISSUE PRN
Status: DISCONTINUED | OUTPATIENT
Start: 2021-02-11 | End: 2021-02-11

## 2021-02-11 RX ORDER — OXYCODONE HYDROCHLORIDE 5 MG/1
10 TABLET ORAL EVERY 4 HOURS PRN
Status: DISCONTINUED | OUTPATIENT
Start: 2021-02-11 | End: 2021-02-11

## 2021-02-11 RX ORDER — HYDROMORPHONE HYDROCHLORIDE 1 MG/ML
0.2 INJECTION, SOLUTION INTRAMUSCULAR; INTRAVENOUS; SUBCUTANEOUS EVERY 5 MIN PRN
Status: DISCONTINUED | OUTPATIENT
Start: 2021-02-11 | End: 2021-02-11 | Stop reason: HOSPADM

## 2021-02-11 RX ORDER — ACETAMINOPHEN 325 MG/1
975 TABLET ORAL 2 TIMES DAILY
Status: DISCONTINUED | OUTPATIENT
Start: 2021-02-11 | End: 2021-02-12

## 2021-02-11 RX ORDER — EPHEDRINE SULFATE 50 MG/ML
INJECTION, SOLUTION INTRAMUSCULAR; INTRAVENOUS; SUBCUTANEOUS PRN
Status: DISCONTINUED | OUTPATIENT
Start: 2021-02-11 | End: 2021-02-11

## 2021-02-11 RX ADMIN — ONDANSETRON 4 MG: 2 INJECTION INTRAMUSCULAR; INTRAVENOUS at 09:19

## 2021-02-11 RX ADMIN — FENTANYL CITRATE 25 MCG: 50 INJECTION, SOLUTION INTRAMUSCULAR; INTRAVENOUS at 08:25

## 2021-02-11 RX ADMIN — LIDOCAINE HYDROCHLORIDE 100 MG: 20 INJECTION, SOLUTION INFILTRATION; PERINEURAL at 07:48

## 2021-02-11 RX ADMIN — HYDROMORPHONE HYDROCHLORIDE 0.5 MG: 1 INJECTION, SOLUTION INTRAMUSCULAR; INTRAVENOUS; SUBCUTANEOUS at 07:48

## 2021-02-11 RX ADMIN — CEFAZOLIN 1 G: 1 INJECTION, POWDER, FOR SOLUTION INTRAMUSCULAR; INTRAVENOUS at 16:43

## 2021-02-11 RX ADMIN — HYDROMORPHONE HYDROCHLORIDE 0.2 MG: 1 INJECTION, SOLUTION INTRAMUSCULAR; INTRAVENOUS; SUBCUTANEOUS at 11:19

## 2021-02-11 RX ADMIN — PROPOFOL 70 MG: 10 INJECTION, EMULSION INTRAVENOUS at 07:48

## 2021-02-11 RX ADMIN — MIDAZOLAM 1 MG: 1 INJECTION INTRAMUSCULAR; INTRAVENOUS at 07:48

## 2021-02-11 RX ADMIN — ACETAMINOPHEN 975 MG: 325 TABLET, FILM COATED ORAL at 19:54

## 2021-02-11 RX ADMIN — CEFAZOLIN 2 G: 10 INJECTION, POWDER, FOR SOLUTION INTRAVENOUS at 08:00

## 2021-02-11 RX ADMIN — Medication 5 MG: at 08:09

## 2021-02-11 RX ADMIN — ACETAMINOPHEN 975 MG: 325 TABLET, FILM COATED ORAL at 06:05

## 2021-02-11 RX ADMIN — DOCUSATE SODIUM AND SENNOSIDES 1 TABLET: 8.6; 5 TABLET ORAL at 19:54

## 2021-02-11 RX ADMIN — SODIUM CHLORIDE, POTASSIUM CHLORIDE, SODIUM LACTATE AND CALCIUM CHLORIDE: 600; 310; 30; 20 INJECTION, SOLUTION INTRAVENOUS at 12:12

## 2021-02-11 RX ADMIN — CELECOXIB 400 MG: 200 CAPSULE ORAL at 06:04

## 2021-02-11 RX ADMIN — DEXMEDETOMIDINE HYDROCHLORIDE 40 MCG: 100 INJECTION, SOLUTION INTRAVENOUS at 07:17

## 2021-02-11 RX ADMIN — BUPIVACAINE HYDROCHLORIDE 30 ML: 2.5 INJECTION, SOLUTION EPIDURAL; INFILTRATION; INTRACAUDAL at 07:17

## 2021-02-11 RX ADMIN — DEXAMETHASONE SODIUM PHOSPHATE 2 MG: 10 INJECTION, SOLUTION INTRAMUSCULAR; INTRAVENOUS at 07:17

## 2021-02-11 RX ADMIN — DEXAMETHASONE SODIUM PHOSPHATE 4 MG: 4 INJECTION, SOLUTION INTRAMUSCULAR; INTRAVENOUS at 08:04

## 2021-02-11 RX ADMIN — ACETAMINOPHEN 975 MG: 325 TABLET, FILM COATED ORAL at 13:19

## 2021-02-11 RX ADMIN — FENTANYL CITRATE 25 MCG: 50 INJECTION, SOLUTION INTRAMUSCULAR; INTRAVENOUS at 08:34

## 2021-02-11 RX ADMIN — FENTANYL CITRATE 75 MCG: 50 INJECTION, SOLUTION INTRAMUSCULAR; INTRAVENOUS at 07:48

## 2021-02-11 RX ADMIN — HYDROMORPHONE HYDROCHLORIDE 0.5 MG: 1 INJECTION, SOLUTION INTRAMUSCULAR; INTRAVENOUS; SUBCUTANEOUS at 08:28

## 2021-02-11 RX ADMIN — OXYCODONE HYDROCHLORIDE 5 MG: 5 TABLET ORAL at 13:25

## 2021-02-11 RX ADMIN — OXYCODONE HYDROCHLORIDE 5 MG: 5 TABLET ORAL at 18:45

## 2021-02-11 RX ADMIN — SODIUM CHLORIDE, POTASSIUM CHLORIDE, SODIUM LACTATE AND CALCIUM CHLORIDE: 600; 310; 30; 20 INJECTION, SOLUTION INTRAVENOUS at 07:38

## 2021-02-11 RX ADMIN — Medication 0.3 MCG/MIN: at 08:11

## 2021-02-11 RX ADMIN — SODIUM CHLORIDE 500 ML: 9 INJECTION, SOLUTION INTRAVENOUS at 17:40

## 2021-02-11 RX ADMIN — Medication 50 MCG: at 08:09

## 2021-02-11 RX ADMIN — DIPHENHYDRAMINE HYDROCHLORIDE 50 MG: 50 INJECTION, SOLUTION INTRAMUSCULAR; INTRAVENOUS at 06:38

## 2021-02-11 RX ADMIN — MIDAZOLAM 1 MG: 1 INJECTION INTRAMUSCULAR; INTRAVENOUS at 07:38

## 2021-02-11 RX ADMIN — Medication 50 MCG: at 08:02

## 2021-02-11 RX ADMIN — FENTANYL CITRATE 25 MCG: 50 INJECTION INTRAMUSCULAR; INTRAVENOUS at 07:12

## 2021-02-11 RX ADMIN — DIPHENHYDRAMINE HYDROCHLORIDE 50 MG: 50 INJECTION, SOLUTION INTRAMUSCULAR; INTRAVENOUS at 17:38

## 2021-02-11 RX ADMIN — Medication 10 MG: at 07:58

## 2021-02-11 RX ADMIN — TRANEXAMIC ACID 1950 MG: 650 TABLET ORAL at 06:05

## 2021-02-11 RX ADMIN — OXYCODONE HYDROCHLORIDE 5 MG: 5 TABLET ORAL at 23:00

## 2021-02-11 ASSESSMENT — MIFFLIN-ST. JEOR: SCORE: 1179

## 2021-02-11 NOTE — ANESTHESIA PROCEDURE NOTES
Pre-Procedure   Staff -   Anesthesiologist:  Ramon Martin MD  Performed By: anesthesiologist  Location: pre-op  Procedure Start/Stop Times: 2/11/2021 7:00 AM  Pre-Anesthestic Checklist: patient identified, IV checked, site marked, risks and benefits discussed, informed consent, monitors and equipment checked, pre-op evaluation, at physician/surgeon's request and post-op pain management  Timeout:  Correct Patient: Yes   Correct Procedure: Yes   Correct Site: Yes   Correct Position: Yes   Correct Laterality: Yes   Site Marked: Yes    Procedure Documentation  Procedure: Adductor canal  Diagnosis: R KNEE OA  Laterality: right  Patient Position:supine  Patient Prep/Sterile Barriers: Chloraprep  Local skin infiltrated with 5 mL of 1% lidocaine.   Needle type: short bevel  Needle Gauge: 21.   Ultrasound guided, Ultrasound used to identify targeted nerve, plexus, vascular marker, or fascial plane and place a needle adjacent to it in real-time, Ultrasound was used to visualize the spread of anesthetic in close proximity to the above referenced structure  A permanent image is entered into the patient's record.  There were no apparent abnormal pathologic findings    Assessment/Narrative      The placement was negative for: blood aspirated, painful injection and site bleeding  Paresthesias: No.  Bolus given via needle..   Secured via.   Insertion/Infusion Method: Single Shot  Complications: none  Comments:  Adductor canal : 12 ml  Periarterial popliteal artery infiltration 18 ml

## 2021-02-11 NOTE — PROVIDER NOTIFICATION
Skin very dry after chlorhexidine shower.  Healing incision left knee.  Steri strips applied to bottom of incision to prevent rubbing from PCD.  Bruise on forehead.

## 2021-02-11 NOTE — OR NURSING
PACU to Inpatient Nursing Handoff    Patient Maria Luisa Garcia is a 70 year old female who speaks English.   Procedure Procedure(s):  Right Total Knee Arthroplasty   Surgeon(s) Primary: Walter Rodriguez MD  Assisting: Todd Fox PA-C     Allergies   Allergen Reactions     Cleocin Anaphylaxis     Codeine Nausea and Vomiting     Pt can tolerate one tablet. Has N/V when taking 2 tablets       Isolation  No active isolations     Past Medical History   has a past medical history of Platelet function defect (H).    Anesthesia Combined General with Block   Dermatome Level     Preop Meds acetaminophen (Tylenol) - time given: 0605  celecoxib (Celebrex) - time given: 0605  Benadryl 50mg at 0638 and TXA - time given: 0605   Nerve block Adductor canal.  Location:right. Med:bupivacaine, epinephrine and precedex and decadron. Time given: 0717   Intraop Meds dexamethasone (Decadron)  fentanyl (Sublimaze): 125 mcg total  hydromorphone (Dilaudid): 1 mg total  ondansetron (Zofran): last given at 0919   Local Meds Yes - Local Cocktail (morphine, ropivacaine, epinephrine, Toradol)   Antibiotics cefazolin (Ancef) - last given at 0800     Pain Patient Currently in Pain: yes   PACU meds  hydromorphone (Dilaudid): 0.2 mg (total dose) last given at 1119    PCA / epidural No   Capnography Respiratory Monitoring (EtCO2): 43 mmHg  Integrated Pulmonary Index (IPI): 10   Telemetry ECG Rhythm: Normal sinus rhythm   Inpatient Telemetry Monitor Ordered? No        Labs Glucose Lab Results   Component Value Date    GLC 93 02/09/2021       Hgb Lab Results   Component Value Date    HGB 14.4 02/09/2021       INR No results found for: INR   PACU Imaging Completed     Wound/Incision Incision/Surgical Site 02/11/21 Right Knee (Active)   Incision Assessment UTV 02/11/21 1000   Lauren-Incision Assessment UTV 02/11/21 1000   Closure NAV 02/11/21 1000   Incision Drainage Amount None 02/11/21 1000   Dressing Intervention Clean, dry, intact 02/11/21 1000    Number of days: 0      CMS        Equipment ice pack and limb elevator   Other LDA       IV Access Peripheral IV 02/11/21 Right Hand (Active)   Site Assessment WDL 02/11/21 1000   Line Status Infusing 02/11/21 1000   Phlebitis Scale 0-->no symptoms 02/11/21 1000   Number of days: 0      Blood Products Platelets:  1 unit ordered, 1 unit given  mL   Intake/Output Date 02/11/21 0700 - 02/12/21 0659   Shift 0726-3694 8152-2788 2391-4671 24 Hour Total   INTAKE   P.O. 120   120   I.V. 800   800   Blood Components 295   295   Shift Total(mL/kg) 1215(17.36)   1215(17.36)   OUTPUT   Shift Total(mL/kg)       Weight (kg) 70 70 70 70      Drains / [place] Closed/Suction Drain Right Knee Accordion 10 Chinese (Active)   Site Description UTV 02/11/21 1133   Dressing Status Normal: Clean, Dry & Intact 02/11/21 1133   Status To bulb suction 02/11/21 1133   Output (ml) 75 ml 02/11/21 1133   Number of days: 0      Time of void PreOp Void Prior to Procedure: 0610 (02/11/21 0610)    PostOp Urine Occurrence: 1(missed the hat but says it was a lot) (02/11/21 1110)    Diapered? No   Bladder Scan Bladder Scan Volume (mL): 702 ml (02/11/21 1045)    mL (02/11/21 1045)  water     Vitals    B/P: 122/71  T: 97.7  F (36.5  C)    Temp src: Oral  P:  Pulse: 83 (02/11/21 1118)          R: 16  O2:  SpO2: 98 %    O2 Device: None (Room air) (02/11/21 1118)    Oxygen Delivery: 6 LPM (02/11/21 0956)         Family/support present Updated son Alvin    Patient belongings     Patient transported on bed   DC meds/scripts (obs/outpt) Not applicable   Inpatient Pain Meds Released? 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

## 2021-02-11 NOTE — ANESTHESIA CARE TRANSFER NOTE
Patient: Maria Luisa Garcia    Procedure(s):  Right Total Knee Arthroplasty    Diagnosis: Tricompartment osteoarthritis of both knees [M17.0]  Diagnosis Additional Information: No value filed.    Anesthesia Type:   General     Note:      Level of Consciousness: awake  Oxygen Supplementation: face mask  Level of Supplemental Oxygen (L/min / FiO2): 6  Independent Airway: airway patency satisfactory and stable  Dentition: dentition unchanged  Vital Signs Stable: post-procedure vital signs reviewed and stable  Report to RN Given: handoff report given  Patient transferred to: PACU    Handoff Report: Identifed the Patient, Identified the Reponsible Provider, Reviewed the pertinent medical history, Discussed the surgical course, Reviewed Intra-OP anesthesia mangement and issues during anesthesia, Set expectations for post-procedure period and Allowed opportunity for questions and acknowledgement of understanding      Vitals: (Last set prior to Anesthesia Care Transfer)  CRNA VITALS  2/11/2021 0923 - 2/11/2021 1000      2/11/2021             Resp Rate (observed):  (!) 1        Electronically Signed By: RAMON Yost CRNA  February 11, 2021  10:00 AM

## 2021-02-11 NOTE — ANESTHESIA POSTPROCEDURE EVALUATION
Patient: Maria Luisa Garcia    Procedure(s):  Right Total Knee Arthroplasty    Diagnosis:Tricompartment osteoarthritis of both knees [M17.0]  Diagnosis Additional Information: No value filed.    Anesthesia Type:  General    Note:     Postop Pain Control: Uneventful            Sign Out: Well controlled pain   PONV: No   Neuro/Psych: Uneventful            Sign Out: Acceptable/Baseline neuro status   Airway/Respiratory: Uneventful            Sign Out: Acceptable/Baseline resp. status   CV/Hemodynamics: Uneventful            Sign Out: Acceptable CV status   Other NRE: NONE   DID A NON-ROUTINE EVENT OCCUR? No         Last vitals:  Vitals:    02/11/21 1100 02/11/21 1118 02/11/21 1130   BP: 118/61 122/71 115/76   Pulse: 76 83 80   Resp: 16 16 16   Temp:   36.4  C (97.5  F)   SpO2: 99% 98% 99%       Last vitals prior to Anesthesia Care Transfer:  CRNA VITALS  2/11/2021 0923 - 2/11/2021 1023      2/11/2021             Resp Rate (observed):  (!) 1          Electronically Signed By: Luc Marinelli DO  February 11, 2021  11:45 AM

## 2021-02-11 NOTE — BRIEF OP NOTE
[facility]    Brief Operative Note    Pre-operative diagnosis: Tricompartment osteoarthritis of both knees [M17.0]  Post-operative diagnosis Same as pre-operative diagnosis    Procedure: Procedure(s):  Right Total Knee Arthroplasty  Surgeon: Surgeon(s) and Role:     * Walter Rodriguez MD - Primary     * Todd Fox PA-C - Assisting  Anesthesia: Combined General with Block   Estimated blood loss: Less than 100 ml  Drains: Hemovac  Specimens: * No specimens in log *  Findings:   See Op Note.  Complications: None.  Implants:   Implant Name Type Inv. Item Serial No.  Lot No. LRB No. Used Action   BONE CEMENT SIMPLEX W/TOBRAMYCIN 6197-9-001 Cement, Bone BONE CEMENT SIMPLEX W/TOBRAMYCIN 6197-9-001  [employer] [id number] Right 1 Implanted   IMP COMP FEM STRK TRIATHLN CR RT 4 5510-F-402 Total Joint Component/Insert IMP COMP FEM STRK TRIATHLN CR RT 4 5510-F-402  [employer] [id number] Right 1 Implanted   IMP INSERT BASEPLATE TIBIAL HOWM TRI 5 5521-B-500 Total Joint Component/Insert IMP INSERT BASEPLATE TIBIAL HOWM TRI 5 5521-B-500  [employer] [id number] Right 1 Implanted   IMP COMP PATELLA HOWM ASYM TRI [id number] 5551-L-320 Total Joint Component/Insert IMP COMP PATELLA HOWM ASYM TRI [id number] 5551-L-320  [employer] [id number] Right 1 Implanted   [employer] Triathlon X3 Tibial Bearing Insert - CS Sz 5 Thkns 9mm Total Joint Component/Insert   [employer] [id number] Right 1 Implanted     See Hematology from Laurent Oleary PA-C on 2/4/2021 for Hematology recommendations.    Plan:  Ortho Primary    -Due to history of platelet function defect, transfuse 1 unit platelets at 1700 POD0. If 40cc or greater drain output AM of POD1, leave drain in place and give additional unit of platelets. Give dose of IV Benadryl 50mg and PO Tylenol 650mg prior to every platelet transfusion.    -If there is postop bleeding or any other bleeding/clotting concerns, page Dr. Dotson at  [phone] or consult Hematology service.    Activity: Up with assist.  Weight bearing status: WBAT.  Antibiotics: cefazolin x 24 hours.  Diet: Begin with clear fluids and progress diet as tolerated.  DVT prophylaxis: PO apixiban (Eliquis) 2.5 mg BID x 14 days starting with first dose 48 hours postop, SCDs in the hospital.  Bracing/Splinting: ACE compressive wrap over dressing, no brace or splint  Wound Care: Prior to discharge, change the dressing if it is > 30% saturated. Otherwise keep the dressing in place. POD7 remove the dressing. Change dressing if at any time the dressing >30% saturated or blood is pooling underneath the Tegaderm.   Drains: Hemovac, remove AM POD1 if <40cc output overnight, otherwise leave drain in place until <40cc output over four hour span after AM POD1 or directed by provider.  Pain management: transition from IV to orals as tolerated.   X-rays: AP knee XR in PACU.  Physical Therapy: ROM, ADL's.  Occupational Therapy: ADL's.  Labs: Trend Hgb on POD #1.  Consults: Hospitalist, PT, OT - appreciate assistance in caring for this patient.  Follow-up: Clinic with Angeline Whaley NP for wound check in 2 weeks, then Dr. Rodriguez for XR in 4-6 weeks.   Disposition: Pending progress with therapies, pain control on orals, and medical stability, anticipate discharge to home vs TCU on POD #1-2.      I positioned the patient. I placed and held retractors to provide adequate exposure that allowed the case to proceed in a safe, efficient manner. I assisted in identifying and protecting important structures. I suctioned fluids when needed. I assisted in positioning and holding the operative extremity in proper positioning during surgery to allow the surgeon to perform various steps of the case. I assisted with the proper selection and positioning of any implants required for the case. I performed wound closure of the operative incisions.    An experienced physician assistant was medically necessary to  ensure a safe and efficient procedure. The assistance that I provided decreased operative time and thereby, reduced the risk of infection and complications from prolonged time of anesthesia. My assistance was vital in achieving best practices.    No qualified residents or fellows were available to assist with this case.        KALLIE Richards, PAOilveC  Physician Assistant, Orthopedic Surgery  Pager: [phone]

## 2021-02-11 NOTE — PHARMACY-ADMISSION MEDICATION HISTORY
Admission medication history interview status for the 2/11/2021 admission is complete. See Epic admission navigator for allergy information, pharmacy, prior to admission medications and immunization status.     Medication history interview sources:  Patient via phone and Surescripts    Changes made to PTA medication list (reason)  Added: none  Deleted: oxycodone (has not been taking it since 1/15/2021)  Changed: Acetaminophen 650 mg >> 500 mg po q8h prn  Calcium-magnesium-vitamin D and magnesium 500 mg po daily >> calcium carbonate 600 mg po bid, Magnesium oxide 400 mg po daily and vitamin D 5000 units (125 mcg) po bid  Coenzyme Q10 sig >> 300 mg po every morning    Additional medication history information (including reliability of information, actions taken by pharmacist): Maria Luisa knows her home medications very well.      Prior to Admission medications    Medication Sig Last Dose Taking? Auth Provider   acetaminophen (TYLENOL) 500 MG tablet Take 500 mg by mouth every 8 hours as needed for mild pain Past Week at Unknown time Yes Unknown, Entered By History   calcium carbonate (OS-MARI) 600 MG tablet Take 600 mg by mouth 2 times daily (with meals) Past Week at Unknown time Yes Unknown, Entered By History   celecoxib (CELEBREX) 100 MG capsule Take 1 capsule (100 mg) by mouth 2 times daily  Patient taking differently: Take 100 mg by mouth as needed  Past Month at Unknown time Yes Michael Rodas MD   cholecalciferol (VITAMIN D3) 125 mcg (5000 units) capsule Take 125 mcg by mouth 2 times daily Winter time dose Past Week at Unknown time Yes Unknown, Entered By History   Coenzyme Q10 300 MG CAPS Take 300 mg by mouth every morning Past Week at Unknown time Yes Unknown, Entered By History   Glucosamine-Chondroit-Vit C-Mn (GLUCOSAMINE CHONDR 1500 COMPLX PO) Take 2 tablets by mouth every morning Brand name: Bi Flex Past Week at Unknown time Yes Reported, Patient   hydrOXYzine (ATARAX) 25 MG tablet Take 1 tablet (25 mg)  by mouth every 6 hours as needed for other (adjuvant pain) Past Month at Unknown time Yes Coco Mora APRN CNS   magnesium oxide (MAG-OX) 400 (240 Mg) MG tablet Take 400 mg by mouth daily Past Week at Unknown time Yes Unknown, Entered By History         Medication history completed by: Ofe Calhoun PharmD, [facility] February 11, 2021

## 2021-02-11 NOTE — OP NOTE
Procedure Date: 02/11/2021      PREOPERATIVE DIAGNOSIS:  Tricompartment osteoarthritis, right knee.      POSTOPERATIVE DIAGNOSIS:  Tricompartment osteoarthritis, right knee.      PROCEDURE PERFORMED:  Right total knee arthroplasty.      SURGEON:  Walter Rodriguez MD      FIRST ASSISTANT:  ASHLEY Richards.  It was medically necessary for a physician assistant to assist in surgical care of this patient from start to finish of the case.  He was required for limb positioning, assistance with exposure, retraction and instrumentation throughout the case.  There was no resident physician available for assistance.      DESCRIPTION OF PROCEDURE:  Under general anesthesia in the supine position, the right knee was prepped and draped in the usual sterile fashion.  A pause for the cause occurred and all present were in agreement.  The limb was exsanguinated and tourniquet inflated to 300 mmHg for 90 minutes.        A 14 cm longitudinal anteromedial incision was made with a median parapatellar arthrotomy.  We noted tricompartmental osteoarthritis, grade 4.  We used the Genero instrumentation system.  We resected 10 mm of the patella for a 9 x 32 mm patellar asymmetric component.  We exposed the distal femur and entered the canal.  We used the alignment guide, flexing the device 3 degrees for slight flexion in the femoral component.  We resected 9 mm of distal femur.  We used the external alignment guide for the tibia and resected 2 mm on the low side and 9 mm on the high side.  We preserved the posterior cruciate ligament, ACL was absent.  We removed the medial and lateral menisci.  We injected the posterior capsule.  We did the 4-in-1 cut block on the femur for a size 4 femur and a size 5 tibia was measured.  We trialed the components and found range of motion, stability and patellar tracking all ideal.  We marked the tibia for rotation and prepared the femur with 2 lug holes and the tibia with the universal baseplate and keel  punch.  Large cysts were present in the tibial baseplate, which were curetted and pulse lavaged.  We pulse lavaged all surfaces and used a single batch of tobramycin impregnated cement, cementing tibia, femur and patella in that sequence, holding all surfaces until polymerization was complete.  We irrigated copiously, used a 9 mm polyethylene CR spacer and found excellent range of motion, stability and alignment.  We closed in layers with #1 Vicryl, #2 Vicryl and with 3-0 Monocryl with Steri-Strips and a compression bandage with a Hemovac in place.      POSTOPERATIVE PLAN:  We will use ice, elevation, ankle pumps and DVT cuffs.  As she has a platelet disorder, we will administer an additional unit of platelets this evening after intravenous pre-infusion of Benadryl is administered.  If persistent drainage occurs in the morning, we will consider another dose of platelets.  She will be up as tolerated, ambulating with compression wrap in place today as tolerated.         SAMANTHA ORTEGA MD             D: 2021   T: 2021   MT: DEMI      Name:     ANTIONE JULIAN   MRN:      [id number]        Account:        [id number]   :      1950           Procedure Date: 2021      Document: [id number]

## 2021-02-11 NOTE — PLAN OF CARE
[unfilled]                                                                              [facility]      OUTPATIENT PHYSICAL THERAPY EVALUATION  PLAN OF TREATMENT FOR OUTPATIENT REHABILITATION  (COMPLETE FOR INITIAL CLAIMS ONLY)  Patient's Last Name, First Name, M.I.  YOB: 1950  Maria Luisa Garcia                        Provider's Name  [facility] Medical Record No.  [id number]                               Onset Date:  02/11/21   Start of Care Date:         Type:     _X_PT   ___OT   ___SLP Medical Diagnosis:                           PT Diagnosis:  impaired functional mobility   Visits from SOC:  1   _________________________________________________________________________________  Plan of Treatment/Functional Goals    Planned Interventions: balance training, bed mobility training, gait training, ROM (range of motion), stair training, strengthening, transfer training     Goals: See Physical Therapy Goals on Care Plan in [facility] electronic health record.    Therapy Frequency: 2x/day  Predicted Duration of Therapy Intervention: 3 days  _________________________________________________________________________________    I CERTIFY THE NEED FOR THESE SERVICES FURNISHED UNDER        THIS PLAN OF TREATMENT AND WHILE UNDER MY CARE     (Physician co-signature of this document indicates review and certification of the therapy plan).               ,      Referring Physician: Todd Fox PA-C            Initial Assessment        See Physical Therapy evaluation dated   in Epic electronic health record.

## 2021-02-11 NOTE — PROGRESS NOTES
VS: Stable/Capnography continues to monitor   O2: Room air saturations 99%.    Output: Has been up to bathroom x 2. Voiding in large amounts.    Last BM: 2/10/2021 reports patient   Activity: Up with walker and SBA.    Skin: Intact. Unable to look under right knee ace wrap Just arrived  From OR. Hemovac continues to be to suction.    Pain: Wanted to start with x 1 Oxycodone right after arriving to unit. Patient continues with scheduled Tylenol.    CMS: Pt has positive pedal pulses bilaterally. Continues to have some numbness in right lower leg. Pt did receive a block during surgery.    Dressing: Right knee  Dressing ace  Wrap intact. Hemovac  To suction.    Diet: Regular. Pt denies nausea.    LDA: IV is infusing.    Equipment: PCD'S/Walker/IS/Capnography/Frequent VS postop   Plan: Patient to reviewed Platelets tonight at 5 pm. Per Ortho teams orders. On coming nurse is aware of orders.    Additional Info: Patient is very helpful and knowledgeable with her cares. Patient resting quite comfortably between cares. Will continue to monitor patient status.

## 2021-02-11 NOTE — CONSULTS
Consult Date:  02/11/2021      INTERNAL MEDICINE CONSULTATION      REQUESTING PHYSICIAN:  Walter Rodriguez MD      ASSESSMENT:   1.  Status post right total knee arthroplasty.  The patient is doing well postoperatively.  Pain is well controlled.   2.  History of platelet defect with a history of excessive postoperative bleeding.  The patient did have moderate bleeding following her recent left total knee arthroplasty 1 month ago, likely secondary to delayed postoperative platelet transfusion and dislodged left knee drain.  The patient did receive a unit of platelets preoperatively and will receive 1 more unit at 17:00 today with premedication with diphenhydramine.  A decision will be made on postop day #1 in the morning whether to give another unit of platelets at that time.  This will be based on the appearance of the knee and the amount of drainage overnight.  This was discussed with the patient, who was in agreement.  Apixaban will be started for prophylaxis approximately 48 hours after surgery, assuming no bleeding complications.  Celebrex will be utilized postoperatively given as it does not affect platelet function.      Thank you for having me see this patient.  I will follow her with you for these medical issues.      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 Rodriguez today.  I have been asked to see her by Dr. Rodriguez to assist in managing platelet dysfunction postoperatively.  The patient is known to me from her most recent surgery in early January, at which time she underwent a left total knee arthroplasty.  The patient at that time did have moderate swelling and bruising around the knee.  Platelet transfusion was delayed and her drain malfunctioned and was dislodged on the third day postoperatively.  Ultimately, the patient has done very well postoperatively and is happy with the results of her left knee.      The events  of this surgery are reviewed.  She had general anesthesia.  Estimated blood loss was less than 100 mL.  Vital signs were stable throughout.      PAST MEDICAL HISTORY:  Remarkable for:   1.  Recent left total knee arthroplasty.   2.  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  There is no history of cardiac or pulmonary disease.      PAST SURGICAL HISTORY:  Multiple orthopedic procedures as noted in the H and P.      FAMILY HISTORY:  Reviewed by me and is noncontributory.      PREOPERATIVE MEDICATIONS:  Tylenol on a p.r.n. basis alone.  She did utilize Celebrex following her last hospitalization for several days.  She did not use oxycodone following discharge.      PHYSICAL EXAMINATION:   GENERAL:  She is a pleasant, in good spirits and appears very comfortable.   VITAL SIGNS:  She is afebrile and normotensive.   HEENT:  Unremarkable.   LUNGS:  Clear.   CARDIAC:  Regular rate and rhythm without murmurs.   ABDOMEN:  Soft, nontender, nondistended.   EXTREMITIES:  Left leg exam reveals a healed incision without erythema or bruising.  Right knee is wrapped.  There is no right calf edema.      LABORATORY DATA:  Most recent labs from  include a normal BMP and CBC.         FREDI GORDON MD             D: 2021   T: 2021   MT: AMANDA      Name:     ANTIONE JULIAN   MRN:      [id number]        Account:       [id number]   :      1950           Consult Date:  2021      Document: [id number]       cc: Walter Rodriguez MD

## 2021-02-11 NOTE — ANESTHESIA PROCEDURE NOTES
Airway   Date/Time: 2/11/2021 8:14 AM   Patient location during procedure: OR  Staff -   CRNA: Darling Ruano APRN CRNA  Performed By: CRNA    Consent for Airway   Urgency: elective    Indications and Patient Condition  Indications for airway management: gin-procedural  Induction type:intravenousMask difficulty assessment: 1 - vent by mask    Final Airway Details  Final airway type: supraglottic airway    Endotracheal Airway Details   Secured with: silk tape    Post intubation assessment   Placement verified by: capnometry, equal breath sounds and chest rise   Number of attempts at approach: 1  Secured with:silk tape  Ease of procedure: easy  Dentition: Intact and Unchanged

## 2021-02-11 NOTE — PROGRESS NOTES
02/11/21 1605   Quick Adds   Type of Visit Initial PT Evaluation       Present no   Language English   Living Environment   People in home friend(s)   Current Living Arrangements house   Home Accessibility stairs within home   Number of Stairs, Within Home, Primary 2   Stair Railings, Within Home, Primary none   Transportation Anticipated family or friend will provide   Self-Care   Usual Activity Tolerance good   Current Activity Tolerance moderate   Regular Exercise No   Equipment Currently Used at Home none   Disability/Function   Hearing Difficulty or Deaf no   Wear Glasses or Blind yes   Concentrating, Remembering or Making Decisions Difficulty no   Difficulty Communicating no   Difficulty Eating/Swallowing no   Walking or Climbing Stairs Difficulty no   Dressing/Bathing Difficulty no   Toileting issues no   Doing Errands Independently Difficulty (such as shopping) no   Fall history within last six months no   Change in Functional Status Since Onset of Current Illness/Injury no   General Information   Onset of Illness/Injury or Date of Surgery 02/11/21   Referring Physician Todd Fox PA-C   Patient/Family Therapy Goals Statement (PT) Did not endorse   Pertinent History of Current Problem (include personal factors and/or comorbidities that impact the POC) s/p R TKA   Existing Precautions/Restrictions fall   Weight-Bearing Status - LUE full weight-bearing   Weight-Bearing Status - RUE full weight-bearing   Weight-Bearing Status - LLE full weight-bearing   Weight-Bearing Status - RLE weight-bearing as tolerated   General Observations Supine in bed upon arrival, agreeable to PT   Cognition   Orientation Status (Cognition) oriented x 4   Affect/Mental Status (Cognition) WNL   Follows Commands (Cognition) WNL   Pain Assessment   Patient Currently in Pain Yes, see Vital Sign flowsheet   Integumentary/Edema   Integumentary/Edema Comments Patient with increased R LE swelling present    Posture    Posture Forward head position;Protracted shoulders;Kyphosis   Posture Comments Mild sitting EOB and standing   Range of Motion (ROM)   ROM Comment Did not formally assess, demonstrates functional ROM with mobility   Strength   Strength Comments Did not formally assess, demonstrates functional strength with mobility   Bed Mobility   Comment (Bed Mobility) PT: Completes supine<>sit transfer with supervision   Transfers   Transfer Safety Comments PT: Completes sit<>stand transfer with CGA and use of walker, utilized L LE only as R LE still with effects of block in place   Gait/Stairs (Locomotion)   Comment (Gait/Stairs) PT; Ambulates to/from bathroom with CGA and use of walker, utilized L LE only and NWB on R d/t effects of block   Balance   Balance Comments Independent sitting balance, CGA for standing balance with UE support from walker   Sensory Examination   Sensory Perception Comments Patient still with effects of block in place   Clinical Impression   Criteria for Skilled Therapeutic Intervention yes, treatment indicated   PT Diagnosis (PT) impaired functional mobility   Influenced by the following impairments increased post-op pain, decreased R LE strength and ROM   Functional limitations due to impairments impaired bed mobility, transfers and ambulation   Clinical Presentation Stable/Uncomplicated   Clinical Presentation Rationale s/p R TKA, mobilizing well   Clinical Decision Making (Complexity) low complexity   Therapy Frequency (PT) 2x/day   Predicted Duration of Therapy Intervention (days/wks) 3 days   Planned Therapy Interventions (PT) balance training;bed mobility training;gait training;ROM (range of motion);stair training;strengthening;transfer training   Anticipated Equipment Needs at Discharge (PT)   (has equipment)   Risk & Benefits of therapy have been explained evaluation/treatment results reviewed;risks/benefits reviewed;care plan/treatment goals reviewed;current/potential barriers  reviewed   PT Discharge Planning    PT Discharge Recommendation (DC Rec) home with assist;home with outpatient physical therapy   PT Rationale for DC Rec PT; For progression per TKA protocol and return to PLOF   PT Brief overview of current status  PT: Patient supervision for bed mobility and CGA for transfers and short distance ambulation with use of walker    Total Evaluation Time   Total Evaluation Time (Minutes) 8

## 2021-02-11 NOTE — OR NURSING
Dr. Rodriguez said that patient was to receive platelets the evening of surgery and that the order was in. I did not see the order so I paged ASHLEY Richards to put the order in. I relayed that info to the floor nurse as well to follow up.

## 2021-02-12 ENCOUNTER — APPOINTMENT (OUTPATIENT)
Dept: PHYSICAL THERAPY | Facility: CLINIC | Age: 71
End: 2021-02-12
Attending: ORTHOPAEDIC SURGERY
Payer: COMMERCIAL

## 2021-02-12 LAB
ANION GAP SERPL CALCULATED.3IONS-SCNC: 5 MMOL/L (ref 3–14)
BLD PROD TYP BPU: NORMAL
BLD PROD TYP BPU: NORMAL
BLD UNIT ID BPU: 0
BLOOD PRODUCT CODE: NORMAL
BPU ID: NORMAL
BUN SERPL-MCNC: 16 MG/DL (ref 7–30)
CALCIUM SERPL-MCNC: 8.7 MG/DL (ref 8.5–10.1)
CHLORIDE SERPL-SCNC: 108 MMOL/L (ref 94–109)
CO2 SERPL-SCNC: 28 MMOL/L (ref 20–32)
CREAT SERPL-MCNC: 0.71 MG/DL (ref 0.52–1.04)
ERYTHROCYTE [DISTWIDTH] IN BLOOD BY AUTOMATED COUNT: 13.2 % (ref 10–15)
GFR SERPL CREATININE-BSD FRML MDRD: 85 ML/MIN/{1.73_M2}
GLUCOSE SERPL-MCNC: 94 MG/DL (ref 70–99)
HCT VFR BLD AUTO: 37.8 % (ref 35–47)
HGB BLD-MCNC: 12.1 G/DL (ref 11.7–15.7)
MCH RBC QN AUTO: 30 PG (ref 26.5–33)
MCHC RBC AUTO-ENTMCNC: 32 G/DL (ref 31.5–36.5)
MCV RBC AUTO: 94 FL (ref 78–100)
NUM BPU REQUESTED: 1
PLATELET # BLD AUTO: 424 10E9/L (ref 150–450)
POTASSIUM SERPL-SCNC: 4 MMOL/L (ref 3.4–5.3)
RBC # BLD AUTO: 4.04 10E12/L (ref 3.8–5.2)
SODIUM SERPL-SCNC: 141 MMOL/L (ref 133–144)
TRANSFUSION STATUS PATIENT QL: NORMAL
TRANSFUSION STATUS PATIENT QL: NORMAL
WBC # BLD AUTO: 12.5 10E9/L (ref 4–11)

## 2021-02-12 PROCEDURE — 99207 PR CDG-MDM COMPONENT: MEETS MODERATE - UP CODED: CPT | Performed by: INTERNAL MEDICINE

## 2021-02-12 PROCEDURE — P9037 PLATE PHERES LEUKOREDU IRRAD: HCPCS | Performed by: INTERNAL MEDICINE

## 2021-02-12 PROCEDURE — 250N000013 HC RX MED GY IP 250 OP 250 PS 637: Performed by: CLINICAL NURSE SPECIALIST

## 2021-02-12 PROCEDURE — 36430 TRANSFUSION BLD/BLD COMPNT: CPT

## 2021-02-12 PROCEDURE — 85027 COMPLETE CBC AUTOMATED: CPT | Performed by: INTERNAL MEDICINE

## 2021-02-12 PROCEDURE — 36415 COLL VENOUS BLD VENIPUNCTURE: CPT | Performed by: INTERNAL MEDICINE

## 2021-02-12 PROCEDURE — 97110 THERAPEUTIC EXERCISES: CPT | Mod: GP

## 2021-02-12 PROCEDURE — 250N000011 HC RX IP 250 OP 636: Performed by: PHYSICIAN ASSISTANT

## 2021-02-12 PROCEDURE — 999N000111 HC STATISTIC OT IP EVAL DEFER

## 2021-02-12 PROCEDURE — 97116 GAIT TRAINING THERAPY: CPT | Mod: GP

## 2021-02-12 PROCEDURE — 250N000013 HC RX MED GY IP 250 OP 250 PS 637: Performed by: PHYSICIAN ASSISTANT

## 2021-02-12 PROCEDURE — 80048 BASIC METABOLIC PNL TOTAL CA: CPT | Performed by: INTERNAL MEDICINE

## 2021-02-12 PROCEDURE — 97530 THERAPEUTIC ACTIVITIES: CPT | Mod: GP

## 2021-02-12 PROCEDURE — 99214 OFFICE O/P EST MOD 30 MIN: CPT | Performed by: INTERNAL MEDICINE

## 2021-02-12 RX ORDER — HYDROCODONE BITARTRATE AND ACETAMINOPHEN 5; 325 MG/1; MG/1
1-2 TABLET ORAL EVERY 4 HOURS PRN
Status: DISCONTINUED | OUTPATIENT
Start: 2021-02-12 | End: 2021-02-13 | Stop reason: HOSPADM

## 2021-02-12 RX ORDER — ACETAMINOPHEN 325 MG/1
650 TABLET ORAL CONTINUOUS PRN
Qty: 60 TABLET | Refills: 0 | Status: SHIPPED | OUTPATIENT
Start: 2021-02-12

## 2021-02-12 RX ORDER — HYDROCODONE BITARTRATE AND ACETAMINOPHEN 5; 325 MG/1; MG/1
1-2 TABLET ORAL EVERY 4 HOURS PRN
Qty: 40 TABLET | Refills: 0 | Status: SHIPPED | OUTPATIENT
Start: 2021-02-12 | End: 2021-04-12

## 2021-02-12 RX ORDER — HYDROXYZINE HYDROCHLORIDE 50 MG/1
50 TABLET, FILM COATED ORAL EVERY 6 HOURS PRN
Status: DISCONTINUED | OUTPATIENT
Start: 2021-02-12 | End: 2021-02-13 | Stop reason: HOSPADM

## 2021-02-12 RX ORDER — HYDROXYZINE HYDROCHLORIDE 25 MG/1
25 TABLET, FILM COATED ORAL EVERY 6 HOURS PRN
Status: DISCONTINUED | OUTPATIENT
Start: 2021-02-12 | End: 2021-02-13 | Stop reason: HOSPADM

## 2021-02-12 RX ORDER — CELECOXIB 100 MG/1
100 CAPSULE ORAL 2 TIMES DAILY
Status: DISCONTINUED | OUTPATIENT
Start: 2021-02-12 | End: 2021-02-13 | Stop reason: HOSPADM

## 2021-02-12 RX ORDER — HYDROXYZINE HYDROCHLORIDE 25 MG/1
25 TABLET, FILM COATED ORAL EVERY 6 HOURS PRN
Qty: 40 TABLET | Refills: 0 | Status: SHIPPED | OUTPATIENT
Start: 2021-02-12 | End: 2021-03-01

## 2021-02-12 RX ORDER — AMOXICILLIN 250 MG
1 CAPSULE ORAL 2 TIMES DAILY
Qty: 30 TABLET | Refills: 0 | Status: SHIPPED | OUTPATIENT
Start: 2021-02-12

## 2021-02-12 RX ORDER — ACETAMINOPHEN 325 MG/1
325-650 TABLET ORAL EVERY 6 HOURS PRN
Status: DISCONTINUED | OUTPATIENT
Start: 2021-02-12 | End: 2021-02-13 | Stop reason: HOSPADM

## 2021-02-12 RX ORDER — POLYETHYLENE GLYCOL 3350 17 G/17G
17 POWDER, FOR SOLUTION ORAL DAILY
Qty: 7 PACKET | Refills: 0 | Status: SHIPPED | OUTPATIENT
Start: 2021-02-12

## 2021-02-12 RX ORDER — ACETAMINOPHEN 325 MG/1
650 TABLET ORAL CONTINUOUS PRN
Qty: 60 TABLET | Refills: 0 | Status: SHIPPED | OUTPATIENT
Start: 2021-02-12 | End: 2021-02-12

## 2021-02-12 RX ORDER — OXYCODONE HYDROCHLORIDE 5 MG/1
5 TABLET ORAL EVERY 4 HOURS PRN
Qty: 40 TABLET | Refills: 0 | Status: SHIPPED | OUTPATIENT
Start: 2021-02-12 | End: 2021-02-12

## 2021-02-12 RX ADMIN — DOCUSATE SODIUM 100 MG: 100 CAPSULE, LIQUID FILLED ORAL at 08:44

## 2021-02-12 RX ADMIN — OXYCODONE HYDROCHLORIDE 5 MG: 5 TABLET ORAL at 06:16

## 2021-02-12 RX ADMIN — HYDROCODONE BITARTRATE AND ACETAMINOPHEN 1 TABLET: 5; 325 TABLET ORAL at 08:48

## 2021-02-12 RX ADMIN — DOCUSATE SODIUM AND SENNOSIDES 1 TABLET: 8.6; 5 TABLET ORAL at 08:43

## 2021-02-12 RX ADMIN — HYDROCODONE BITARTRATE AND ACETAMINOPHEN 2 TABLET: 5; 325 TABLET ORAL at 18:43

## 2021-02-12 RX ADMIN — CELECOXIB 100 MG: 100 CAPSULE ORAL at 10:51

## 2021-02-12 RX ADMIN — HYDROCODONE BITARTRATE AND ACETAMINOPHEN 2 TABLET: 5; 325 TABLET ORAL at 13:44

## 2021-02-12 RX ADMIN — OXYCODONE HYDROCHLORIDE 5 MG: 5 TABLET ORAL at 02:49

## 2021-02-12 RX ADMIN — POLYETHYLENE GLYCOL 3350 17 G: 17 POWDER, FOR SOLUTION ORAL at 08:43

## 2021-02-12 RX ADMIN — DOCUSATE SODIUM 100 MG: 100 CAPSULE, LIQUID FILLED ORAL at 21:09

## 2021-02-12 RX ADMIN — DIPHENHYDRAMINE HYDROCHLORIDE 50 MG: 50 INJECTION, SOLUTION INTRAMUSCULAR; INTRAVENOUS at 10:51

## 2021-02-12 RX ADMIN — CEFAZOLIN 1 G: 1 INJECTION, POWDER, FOR SOLUTION INTRAMUSCULAR; INTRAVENOUS at 00:34

## 2021-02-12 RX ADMIN — CELECOXIB 100 MG: 100 CAPSULE ORAL at 21:09

## 2021-02-12 RX ADMIN — HYDROXYZINE HYDROCHLORIDE 25 MG: 25 TABLET, FILM COATED ORAL at 11:57

## 2021-02-12 RX ADMIN — HYDROXYZINE HYDROCHLORIDE 50 MG: 50 TABLET, FILM COATED ORAL at 21:09

## 2021-02-12 RX ADMIN — DOCUSATE SODIUM AND SENNOSIDES 1 TABLET: 8.6; 5 TABLET ORAL at 21:09

## 2021-02-12 NOTE — PROGRESS NOTES
Acupuncture Clinical Internship Intake and Treatment Documentation   [facility]    Date:  2/12/2021  Patient Name:  Maria Luisa Garcia   YOB: 1950     Repeat Patient:  no  Has patient had acupoint/acupressure treatment before:  yes    Signed consent placed in the medical record:  yes  Patient/Family verbalizes understanding of risks and benefits:  yes  Required information provided to patient:  yes    Diagnosis:  Tricompartment osteoarthritis of both knees [M17.0]    Patient condition and treatment:  Relaxation and stress  Reason for Intervention Today/Chief Complaint:  Relaxation and stress     Isolation:  No  Type:  None    PRE-SCORE:  moderate    Other Western medical information:  n/a    Medications   Current Facility-Administered Medications:      acetaminophen (TYLENOL) tablet 325-650 mg, 325-650 mg, Oral, Q6H PRN, Coco Mora APRN CNS     acetaminophen (TYLENOL) tablet 650 mg, 650 mg, Oral, Continuous PRN, Todd Fox PA-C     [START ON 2/13/2021] apixaban ANTICOAGULANT (ELIQUIS) tablet 2.5 mg, 2.5 mg, Oral, BID, Todd Fox PA-C     benzocaine-menthol (CEPACOL) 15-3.6 MG lozenge 1 lozenge, 1 lozenge, Buccal, Q1H PRN, Todd Fox PA-C     bisacodyl (DULCOLAX) Suppository 10 mg, 10 mg, Rectal, Daily PRN, Todd Fox PA-C     celecoxib (celeBREX) capsule 100 mg, 100 mg, Oral, BID, Coco Mora APRN CNS, 100 mg at 02/12/21 1051     diphenhydrAMINE (BENADRYL) injection 50 mg, 50 mg, Intravenous, Continuous PRN, Todd Fox PA-C, 50 mg at 02/12/21 1051     docusate sodium (COLACE) capsule 100 mg, 100 mg, Oral, BID, Todd Fox PA-C, 100 mg at 02/12/21 0844     HYDROcodone-acetaminophen (NORCO) 5-325 MG per tablet 1-2 tablet, 1-2 tablet, Oral, Q4H PRN, Coco Mora APRN CNS, 2 tablet at 02/12/21 1344     HYDROmorphone (PF) (DILAUDID) injection 0.2 mg, 0.2 mg, Intravenous, Q2H PRN **OR** HYDROmorphone (PF) (DILAUDID)  injection 0.4 mg, 0.4 mg, Intravenous, Q2H PRN, Todd Fox PA-C     hydrOXYzine (ATARAX) tablet 25 mg, 25 mg, Oral, Q6H PRN, 25 mg at 02/12/21 1157 **OR** hydrOXYzine (ATARAX) tablet 50 mg, 50 mg, Oral, Q6H PRN, Coco Mora APRN CNS     lactated ringers infusion, , Intravenous, Continuous, Todd Fox PA-C, Last Rate: 75 mL/hr at 02/11/21 1212, New Bag at 02/11/21 1212     lidocaine (LMX4) cream, , Topical, Q1H PRN, Todd Fox PA-C     lidocaine 1 % 0.1-1 mL, 0.1-1 mL, Other, Q1H PRN, Todd Fox PA-C     magnesium hydroxide (MILK OF MAGNESIA) suspension 30 mL, 30 mL, Oral, Daily PRN, Todd Fox PA-C     naloxone (NARCAN) injection 0.2 mg, 0.2 mg, Intravenous, Q2 Min PRN **OR** naloxone (NARCAN) injection 0.4 mg, 0.4 mg, Intravenous, Q2 Min PRN **OR** naloxone (NARCAN) injection 0.2 mg, 0.2 mg, Intramuscular, Q2 Min PRN **OR** naloxone (NARCAN) injection 0.4 mg, 0.4 mg, Intramuscular, Q2 Min PRN, [facility], [facility]     ondansetron (ZOFRAN-ODT) ODT tab 4 mg, 4 mg, Oral, Q6H PRN **OR** ondansetron (ZOFRAN) injection 4 mg, 4 mg, Intravenous, Q6H PRN, Todd Fox PA-C     polyethylene glycol (MIRALAX) Packet 17 g, 17 g, Oral, Daily, Todd Fox PA-C, 17 g at 02/12/21 0843     prochlorperazine (COMPAZINE) injection 5 mg, 5 mg, Intravenous, Q6H PRN **OR** prochlorperazine (COMPAZINE) tablet 5 mg, 5 mg, Oral, Q6H PRN, Todd Fox PA-C     senna-docusate (SENOKOT-S/PERICOLACE) 8.6-50 MG per tablet 1 tablet, 1 tablet, Oral, BID, Todd Fox PA-C, 1 tablet at 02/12/21 0843     sodium chloride (PF) 0.9% PF flush 3 mL, 3 mL, Intracatheter, Q8H PRN, Todd Fox PA-C     sodium chloride (PF) 0.9% PF flush 3 mL, 3 mL, Intracatheter, q1 min prn, Todd Fox PA-C     sodium phosphate (FLEET ENEMA) 1 enema, 1 enema, Rectal, Once PRN, Todd Fox PA-C      Pre-Treatment Assessment  Chief Complaint/ Reason for Intervention Today:  Relaxation and  stress  Chief Complaint Pre-Score:  moderate   Describe:  Stress: has 'white coat syndrome.' Also has not slept very well last night due to thoughts of her knee surgery and inability to bend right foot. Was up every half hour and did not feel she completely fell back asleep. Rated fatigue today at 6-7/10.  Pain Location:  knee  Pre Session Pain:  None  Pre Session Anxiety:  Mild  Pre Session Nausea:  None    10 Traditional Chinese Medicine Assessment Questions  - Cold/ Heat:  n/a  - Sweat:  n/a  - Headaches/Body aches:  Has hx of HA, above right eye and occiput. Used to have migraines but has since stopped due to menopause.   - Chest/Abdomen:  n/a  - Digestion:  n/a  - Bowel Movement/Urination:  Bms 1-2x/day, urination 4-5x/day. Input=output  - Hearing/Vision:  n/a  - Sleep (prior to hospital):  Usually sleeps 8 hours, with no sleep disturbed sleep  - Energy:  Fatigue 6-7/10  - Emotions:  Feeling calmness with the whole situation but still some anxiety.  - Ob Gyn:  menopause  - Miscellaneous:  n/a    Traditional Chinese Medicine Assessment  - TONGUE:  N/a COVID  - PULSE:  Stronger in cun position and weaker in michelle and chi. wiry  - OBSERVATIONS:  n/a     Traditional Chinese Medicine Diagnosis  - BRANCH:  Spleen Qi Deficiency  - ROOT:  Liver Qi Stagnation     Traditional Chinese Medicine Treatment  - ACUPUNCTURE:  (L) Sp6, St36, LI4, Liv3. (R) auricular knee point. Bilateral auricular reyes men and point zero     Magnet informed consent signed and given:  no    Post Treatment Assessment  Chief complaint post score: better  Post Session Observation:  Seems more relaxed  Patient/Family Education:  n/a  Verbal information provided:  yes  Written information provided:  no  All questions answered at time of treatment:  yes    Treatment/Procedure(s) performed by:  Yusra Raya    Date: 2/12/2021     I attest that this acupuncture treatment was done under my supervision and this note is complete and true.     Supervising  acupuncturist:  Nelson Goodson LAc [facility] Acupuncture license #: [id number]  P: [phone]

## 2021-02-12 NOTE — PLAN OF CARE
VS: VS, capno on    O2: <90% on room air    Output: Voiding spontaneously - fluids encouraged   Last BM: 2/11/21    Activity: Up with assist of 1 walker and gait belt    Up for meals? yes   Skin: CDI ex RLE incision   Pain: Well controlled with PRN oxycodone and scheduled tylenol    CMS: Numbness still in surgical extremity from knee down, baseline numbness in LLE    Dressing: CDI    Diet: Regular    LDA: PIV infusing continuous, platelets given per order. Site was a little red upon start so stopped infusion, notified provider, and got new IV in. Verified with provider that it is not transfusion reaction and pts vitals were normal and free of signs and symptoms of reaction. Restarted infusion in new IV and no further redness in vein. Pt reported no other signs and symptoms throughout infusion.    Equipment: IV pump and pole, walker    Plan: Continue POC   Additional Info:

## 2021-02-12 NOTE — PROGRESS NOTES
Pt feels well, states pain controlled    Drain output 70 ml    VSS  Alert, fully oriented, appears comfortable  Lungs clear  CV rrr  Abd soft  R knee wrapped, no R calf edema      Results for PERSONANTIONE (MRN [id number]) as of 2/12/2021 11:40   Ref. Range 2/12/2021 06:02   Sodium Latest Ref Range: 133 - 144 mmol/L 141   Potassium Latest Ref Range: 3.4 - 5.3 mmol/L 4.0   Chloride Latest Ref Range: 94 - 109 mmol/L 108   Carbon Dioxide Latest Ref Range: 20 - 32 mmol/L 28   Urea Nitrogen Latest Ref Range: 7 - 30 mg/dL 16   Creatinine Latest Ref Range: 0.52 - 1.04 mg/dL 0.71   GFR Estimate Latest Ref Range: >60 mL/min/1.73_m2 85   GFR Estimate If Black Latest Ref Range: >60 mL/min/1.73_m2 >90   Calcium Latest Ref Range: 8.5 - 10.1 mg/dL 8.7   Anion Gap Latest Ref Range: 3 - 14 mmol/L 5   Glucose Latest Ref Range: 70 - 99 mg/dL 94   WBC Latest Ref Range: 4.0 - 11.0 10e9/L 12.5 (H)   Hemoglobin Latest Ref Range: 11.7 - 15.7 g/dL 12.1   Hematocrit Latest Ref Range: 35.0 - 47.0 % 37.8   Platelet Count Latest Ref Range: 150 - 450 10e9/L 424   RBC Count Latest Ref Range: 3.8 - 5.2 10e12/L 4.04   MCV Latest Ref Range: 78 - 100 fl 94   MCH Latest Ref Range: 26.5 - 33.0 pg 30.0   MCHC Latest Ref Range: 31.5 - 36.5 g/dL 32.0   RDW Latest Ref Range: 10.0 - 15.0 % 13.2       Assessment    S/p R KTA    History of platelet dysfunction with post op bleeding. Pt received 1 unit of platelets preop and at 1700    Plan  Repeat platelet transfusion this am, reassess for further transfusion this pm  Anticipate starting proph apixaban tomorrow or the next day

## 2021-02-12 NOTE — PROVIDER NOTIFICATION
Pt expressed concerns regarding the lack of movement with her surgical (R leg) foot. Ortho paged and assess. No concerns at this time.

## 2021-02-12 NOTE — PROGRESS NOTES
Orthopaedic Surgery Progress Note 02/12/2021    E: No acute events overnight.    S: Pain well controlled. States block is still working.  Denies new numbness or tingling, chest pain, SOB. Tolerating PO intake. Plan of care reviewed and questions answered    O:  Temp: 97.6  F (36.4  C) Temp src: Oral BP: 116/53 Pulse: 72   Resp: 16 SpO2: 98 % O2 Device: None (Room air) Oxygen Delivery: 6 LPM    Exam:  Gen: No acute distress, resting comfortably in bed.  Resp: Non-labored breathing    RLE  - Dressing CDI  - Fires quad, TA, gastroc/soleus, EHL, FHL, wiggles toes  - SILT to superficial peroneal, deep peroneal, saphenous, sural, and tibial nerve distributions  - 2+ DP and PT, foot warm and well perfused    Drain output: 120/140mL last 2 shifts    Recent Labs   Lab 02/12/21  0602 02/09/21  0721   WBC 12.5* 6.2   HGB 12.1 14.4    332       Assessment: Maria Luisa Garcia is a 70 year old female s/p right TKA on 2/11/21 with Dr. Rodriguez. Doing well.      Plan:  Ortho Primary     -Due to history of platelet function defect, transfuse 1 unit platelets at 1700 POD0. If 40cc or greater drain output AM of POD1, leave drain in place and give additional unit of platelets. Give dose of IV Benadryl 50mg and PO Tylenol 650mg prior to every platelet transfusion.     -If there is postop bleeding or any other bleeding/clotting concerns, page Dr. Dotson at [phone] or consult Hematology service.    Activity: Up with assist.  Weight bearing status: WBAT.  Antibiotics: cefazolin x 24 hours.  Diet: regular  DVT prophylaxis: PO apixiban (Eliquis) 2.5 mg BID x 14 days starting with first dose 48 hours postop, SCDs in the hospital.  Bracing/Splinting: ACE compressive wrap over dressing, no brace or splint  Wound Care: Prior to discharge, change the dressing if it is > 30% saturated. Otherwise keep the dressing in place. POD7 remove the dressing. Change dressing if at any time the dressing >30% saturated or blood is pooling underneath the  Tegaderm.   Drains: Hemovac, remove AM POD1 if <40cc output overnight, otherwise leave drain in place until <40cc output over four hour span after AM POD1 or directed by provider.  Pain management: transition from IV to orals as tolerated.   X-rays: complete  Physical Therapy: ROM, ADL's.  Occupational Therapy: ADL's.  Labs: Trend Hgb and platelets  Consults: Hospitalist, PT, OT - appreciate assistance in caring for this patient.  Follow-up: Clinic with Angeline Whaley NP for wound check in 2 weeks, then Dr. Rodriguez for XR in 4-6 weeks.   Disposition: Pending progress with therapies, pain control on orals, and medical stability, anticipate discharge to home on POD2.        Roshan Swartz MD 02/12/2021  PGY-4, Orthopaedic Surgery  Pager: [phone]

## 2021-02-12 NOTE — PLAN OF CARE
VS: VSS.   O2: Room air >90%.   Output: Voiding without difficulty.    Last BM: 2/11.   Activity: Up with Ax1 and walker.   Skin: Intact except incision.   Pain: Oxy 5 mg Q3H. Pt has her own ice machine. Scheduled meds.   CMS: R leg continues to be numb from surgery. States numbness in L ankle as well.   Dressing: CDI.   Diet: Regular.   LDA: PIV and hemovac.   Equipment: Personal belongings, PCDs, pillows/limb elevator.   Plan: TBD.   Additional Info: Pt was concerned about her lack of movement with her surgical leg- was mostly concerned about possible foot drop. Ortho and anesthesia evaluated patient and will reassess tomorrow but did not address any concerns.** Pt able to move leg much better this AM.    Patient vital signs are at baseline: Yes.  Patient able to ambulate as they were prior to admission or with assist devices provided by therapies during their stay: Yes.  Patient MUST void prior to discharge:  Yes.  Patient able to tolerate oral intake:  Yes.  Pain has adequate pain control using Oral analgesics: Yes.

## 2021-02-12 NOTE — PROGRESS NOTES
"  VS: Stable   O2: Room air saturations WNL   Output: Up to bathroom to void. Denies  Issues with voiding   Last BM: 2/11/2/21 reports patient   Activity: Up with walker   Skin: Intact.    Pain: Patient was switched to Vicodin on this shift. Patients also was having problems with spasms today. Pt required Vistaril x 1 and x 2 of Vicodin to try to get more relief.    CMS: Right foot numbness is improving but patient states\" It is still slightly there.\" Surgeon resident is going to round on patient later in evening again. Dr Swartz.    Dressing: Right knee ace wrap and cotton wrap was removed this am. Dr Rodriguez came to unit and wanted a pressure dressing applied x 1 hour and Hemovac output to be measured accurately. Patient states\" I am having minimal drainage after having the platelets compared to last surgery.\"    Diet: Regular.   LDA: IV SL    Equipment: Walker/IS/   Plan:    Additional Info: Patients had platelets infused this shift with premedication of Benadryl. Patient tolerated infusion well. Patient also had a pressure dressing applied to right knee x 1 hour per Dr Rodriguez's orders. Hemovac output was monitored closely. Please call Dr Rodas directly tonight if you need anything for this patient. Patient is very aware of her condition and bleeding postop. As of right now we will plan on not giving platelets on evening shift. Continue to monitor patients Hemovac output and bleeding closely. Patient is resting quite comfortably in between cares.      "

## 2021-02-12 NOTE — PLAN OF CARE
OT: Pt moving well per PT, had prior knee surgery in the past.  No concerns for ADL.  OT will defer and complete orders.

## 2021-02-12 NOTE — PLAN OF CARE
Physical Therapy Discharge Summary    Reason for therapy discharge:    All goals and outcomes met, no further needs identified.    Progress towards therapy goal(s). See goals on Care Plan in [facility] electronic health record for goal details.  Goals met  pt mod-I in facility. Performs exercises IND regularly    Therapy recommendation(s):    Continued therapy is recommended.  Rationale/Recommendations:  OP PT to progress gait, strength, ROM.

## 2021-02-13 VITALS
HEIGHT: 63 IN | OXYGEN SATURATION: 99 % | DIASTOLIC BLOOD PRESSURE: 71 MMHG | SYSTOLIC BLOOD PRESSURE: 142 MMHG | TEMPERATURE: 97 F | HEART RATE: 77 BPM | BODY MASS INDEX: 27.34 KG/M2 | WEIGHT: 154.32 LBS | RESPIRATION RATE: 16 BRPM

## 2021-02-13 LAB
BLD PROD TYP BPU: NORMAL
BLD PROD TYP BPU: NORMAL
BLD UNIT ID BPU: 0
BLOOD PRODUCT CODE: NORMAL
BPU ID: NORMAL
HGB BLD-MCNC: 11.7 G/DL (ref 11.7–15.7)
NUM BPU REQUESTED: 1
TRANSFUSION STATUS PATIENT QL: NORMAL
TRANSFUSION STATUS PATIENT QL: NORMAL

## 2021-02-13 PROCEDURE — 85018 HEMOGLOBIN: CPT | Performed by: PHYSICIAN ASSISTANT

## 2021-02-13 PROCEDURE — P9037 PLATE PHERES LEUKOREDU IRRAD: HCPCS | Performed by: INTERNAL MEDICINE

## 2021-02-13 PROCEDURE — 250N000013 HC RX MED GY IP 250 OP 250 PS 637: Performed by: PHYSICIAN ASSISTANT

## 2021-02-13 PROCEDURE — 250N000011 HC RX IP 250 OP 636: Performed by: PHYSICIAN ASSISTANT

## 2021-02-13 PROCEDURE — 36430 TRANSFUSION BLD/BLD COMPNT: CPT

## 2021-02-13 PROCEDURE — 36415 COLL VENOUS BLD VENIPUNCTURE: CPT | Performed by: PHYSICIAN ASSISTANT

## 2021-02-13 PROCEDURE — 99213 OFFICE O/P EST LOW 20 MIN: CPT | Performed by: INTERNAL MEDICINE

## 2021-02-13 PROCEDURE — 250N000013 HC RX MED GY IP 250 OP 250 PS 637: Performed by: CLINICAL NURSE SPECIALIST

## 2021-02-13 PROCEDURE — 99207 PR CDG-MDM COMPONENT: MEETS MODERATE - UP CODED: CPT | Performed by: INTERNAL MEDICINE

## 2021-02-13 RX ORDER — SODIUM CHLORIDE 9 MG/ML
INJECTION, SOLUTION INTRAVENOUS
Status: DISCONTINUED
Start: 2021-02-13 | End: 2021-02-13 | Stop reason: HOSPADM

## 2021-02-13 RX ORDER — CELECOXIB 100 MG/1
100 CAPSULE ORAL 2 TIMES DAILY
Qty: 20 CAPSULE | Refills: 0 | Status: SHIPPED | OUTPATIENT
Start: 2021-02-13 | End: 2021-03-08

## 2021-02-13 RX ADMIN — CELECOXIB 100 MG: 100 CAPSULE ORAL at 08:46

## 2021-02-13 RX ADMIN — HYDROCODONE BITARTRATE AND ACETAMINOPHEN 1 TABLET: 5; 325 TABLET ORAL at 11:32

## 2021-02-13 RX ADMIN — DOCUSATE SODIUM AND SENNOSIDES 1 TABLET: 8.6; 5 TABLET ORAL at 08:46

## 2021-02-13 RX ADMIN — DOCUSATE SODIUM 100 MG: 100 CAPSULE, LIQUID FILLED ORAL at 08:46

## 2021-02-13 RX ADMIN — ACETAMINOPHEN 650 MG: 325 TABLET, FILM COATED ORAL at 09:16

## 2021-02-13 RX ADMIN — HYDROCODONE BITARTRATE AND ACETAMINOPHEN 1 TABLET: 5; 325 TABLET ORAL at 02:29

## 2021-02-13 RX ADMIN — DIPHENHYDRAMINE HYDROCHLORIDE 50 MG: 50 INJECTION, SOLUTION INTRAMUSCULAR; INTRAVENOUS at 09:16

## 2021-02-13 RX ADMIN — HYDROCODONE BITARTRATE AND ACETAMINOPHEN 1 TABLET: 5; 325 TABLET ORAL at 11:30

## 2021-02-13 RX ADMIN — POLYETHYLENE GLYCOL 3350 17 G: 17 POWDER, FOR SOLUTION ORAL at 08:47

## 2021-02-13 RX ADMIN — HYDROCODONE BITARTRATE AND ACETAMINOPHEN 2 TABLET: 5; 325 TABLET ORAL at 06:35

## 2021-02-13 RX ADMIN — HYDROXYZINE HYDROCHLORIDE 25 MG: 25 TABLET, FILM COATED ORAL at 03:28

## 2021-02-13 NOTE — PROGRESS NOTES
Patient was seen, course reviewed with nursing staff and orthopedics.    Patient feels well, states right knee pain is well controlled.  She believes she would benefit from 1 more unit of platelets this morning.  She had approximately 60 cc of drain output overnight    Vital signs stable  Alert, in good spirits  Lungs clear  CV regular rhythm  Right leg wrapped, no right calf tenderness      Assessment    Status post right total knee arthroplasty.  Patient is doing well    Platelet dysfunction with history of postoperative bleeding.  Overall, this appears to be well controlled.    Plan  1 unit of platelets today  Discharge later today  Start apixaban on February 15 for prophylactic course  Celebrex 100 mg twice daily on discharge.

## 2021-02-13 NOTE — PLAN OF CARE
[id number]  VS: A&Ox4. VSS. Denied chest pain. Denied SOB.    O2: >95% on RA   Output: Up to bathroom voiding spontaneously w/o difficulty. Last BM 2/11, pt is passing gas.   Activity: Ax1 walker   Skin: Intact except for R knee incision   Pain: Pt c/o pain in RLE. Given PRN norco.   CMS: Intact, denied numbness or tingling.    Dressing: ACE wrap removed, dressing assessed. Scant amt of dried serosanguineous drainage noted. ACE wrap reapplied, CDI.   Diet: Regular   LDA: R PIV removed. Hemovacc pulled this AM by ortho resident.    Equipment: Personal belongings   Additional Info/Plan: Pt received platelet infusion of 248mL; starting at 0926 and stopped at 1028. Pt tolerated treatment well, no signs of transfusion reaction reported or noted.    Pt discharged from unit around 1200. Discharge paperwork + medications given and education provided. No further questions from pt. Pt discharging to home via .

## 2021-02-13 NOTE — PROGRESS NOTES
Orthopaedic Surgery Progress Note 02/13/2021    E: No acute events overnight. 1U platelets yesterday    S: Pain well controlled. Ambulated with PT.  Denies new numbness or tingling, chest pain, SOB. Tolerating PO intake. Plan of care reviewed and questions answered    O:  Temp: 96.9  F (36.1  C) Temp src: Oral BP: (!) 143/62 Pulse: 77   Resp: 16 SpO2: 100 % O2 Device: None (Room air)      Exam:  Gen: No acute distress, resting comfortably in bed.  Resp: Non-labored breathing    RLE  - Dressing CDI  - Fires quad, TA, gastroc/soleus, EHL, FHL, wiggles toes  - SILT to superficial peroneal, deep peroneal, saphenous, sural, and tibial nerve distributions  - 2+ DP and PT, foot warm and well perfused    Drain output: 95/60mL last 2 shifts    Recent Labs   Lab 02/13/21  0708 02/12/21  0602 02/09/21  0721   WBC  --  12.5* 6.2   HGB 11.7 12.1 14.4   PLT  --  424 332       Assessment: Maria Luisa Garcia is a 70 year old female s/p right TKA on 2/11/21 with Dr. Rodriguez. Doing well.      Plan:  Ortho Primary     -Due to history of platelet function defect, transfuse 1 unit platelets at 1700 POD0. If 40cc or greater drain output AM of POD1, leave drain in place and give additional unit of platelets. Give dose of IV Benadryl 50mg and PO Tylenol 650mg prior to every platelet transfusion.     -If there is postop bleeding or any other bleeding/clotting concerns, page Dr. Dotson at [phone] or consult Hematology service.    Activity: Up with assist.  Weight bearing status: WBAT.  Antibiotics: cefazolin x 24 hours complete  Diet: regular  DVT prophylaxis: PO apixiban (Eliquis) 2.5 mg BID x 14 days starting with first on Monday, SCDs in the hospital.  Bracing/Splinting: ACE compressive wrap over dressing, no brace or splint  Wound Care: Otherwise keep the dressing in place. POD7 remove the dressing. Change dressing if at any time the dressing >30% saturated or blood is pooling underneath the Tegaderm.   Drains: removed today.  Pain  management: transition from IV to orals as tolerated.   X-rays: complete  Physical Therapy: ROM, ADL's.  Occupational Therapy: ADL's.  Labs: Trend Hgb and platelets  Consults: Hospitalist, PT, OT - appreciate assistance in caring for this patient.  Follow-up: Clinic with Angeline Whaley NP for wound check in 2 weeks, then Dr. Rodriguez for XR in 4-6 weeks.   Disposition: discharge to home today      Roshan Swartz MD 02/13/2021  PGY-4, Orthopaedic Surgery  Pager: [phone]

## 2021-02-13 NOTE — PROVIDER NOTIFICATION
Message sent to Dr. Rodas @ 9009 of pt's request to receive platelets, waiting for call back.  Add: Notified cross sherman Murguia of pt's request,he will talk to the pt about it.

## 2021-02-13 NOTE — PROGRESS NOTES
Writer is MARCIA Tovar today. Patient received platelet infusion of 248 mL started at 0926 and ended at 1028. No transfusion reaction noted. Started and stopped with Angela JOHN

## 2021-02-13 NOTE — PLAN OF CARE
VS: Temp: 96.9  F (36.1  C) Temp src: Oral BP: (!) 143/62 Pulse: 77   Resp: 16 SpO2: 100 % O2 Device: None (Room air)       O2: Stable on RA overnight    Output: Voiding spontaneously and without difficulty  Hemovac draining overnight. 60 mL out from [id number]   Last BM: 2/11/21   Activity: SBA w/ walker. WBAT   Skin: Intact ex incision R knee and healing L knee from previous knee surgery   Pain: Managed overnight with Norco q4H and atarax q6h. Pain well controlled overnight    CMS: Intact ex numbness BLE, improving    Dressing: Scant dried drainage    Diet: Regualr    LDA: PIV SL. Hemovac draining    Equipment: IV pump and pole, walker, gait belt, limb elevator, personal    Plan: Continue to monitor throughout shift and intervene when necessary. Pending pain control, post op bleeding and medical stability possible discharge home today (2/13/21)   Additional Info: Pt stated that hemovac tubing came disconnected while moving in bed when she woke up this morning. Pt stated everything remained sterile & she stuck the tubing back together. Upon assessment drain appeared to be WDL     Patient vital signs are at baseline: Yes  Patient able to ambulate as they were prior to admission or with assist devices provided by therapies during their stay:  Yes  Patient MUST void prior to discharge:  Yes  Patient able to tolerate oral intake:  Yes  Pain has adequate pain control using Oral analgesics:  Yes

## 2021-02-13 NOTE — PLAN OF CARE
Patient A&O times 4; VSS; LS clear and BS+; c/o pain to right knee; Norco and Vistaril given given for pain; ice pack to right knee; dressing CD&I; patient requesting platelet and MD notified; doctor went in to speak with patient about platelet infusion; patient up and ambulated in pham; knee dressing CD&I and patient removed ace wrap per tolerance.

## 2021-02-14 NOTE — DISCHARGE SUMMARY
Orthopaedic Surgery Discharge Summary          Name:  Maria Luisa Garcia  MRN:  [id number]  YOB: 1950      Date of Admission:  2/11/2021  Date of Discharge::  2/13/2021  Discharge Physician:  Jennifer               Admission Diagnoses:   Tricompartment osteoarthritis of both knees [M17.0]          Discharge Diagnosis:     Patient Active Problem List    Diagnosis     Post-operative state     Tricompartment osteoarthritis of both knees     Pain in joint involving ankle and foot     History of hammer toe correction             Procedures:   Surgery: Right total knee arthroplasty  Date: 2/11/2021          Discharge Medications:     Discharge Medication List as of 2/13/2021 10:58 AM      START taking these medications    Details   apixaban ANTICOAGULANT (ELIQUIS) 2.5 MG tablet Take 1 tablet (2.5 mg) by mouth 2 times daily for 14 days, Disp-28 tablet, R-0, E-Prescribe      HYDROcodone-acetaminophen (NORCO) 5-325 MG tablet Take 1-2 tablets by mouth every 4 hours as needed for moderate to severe pain, Disp-40 tablet, R-0, E-Prescribe      polyethylene glycol (MIRALAX) 17 g packet Take 17 g by mouth daily, Disp-7 packet, R-0, E-Prescribe      senna-docusate (SENOKOT-S/PERICOLACE) 8.6-50 MG tablet Take 1 tablet by mouth 2 times daily, Disp-30 tablet, R-0, E-Prescribe         CONTINUE these medications which have CHANGED    Details   acetaminophen (TYLENOL) 325 MG tablet Take 2 tablets (650 mg) by mouth continuous prn for other (Give dose prior to any platelet infusion), Disp-60 tablet, R-0, E-Prescribe      celecoxib (CELEBREX) 100 MG capsule Take 1 capsule (100 mg) by mouth 2 times daily, Disp-20 capsule, R-0, E-Prescribe      hydrOXYzine (ATARAX) 25 MG tablet Take 1 tablet (25 mg) by mouth every 6 hours as needed for other (adjuvant pain), Disp-40 tablet, R-0, E-Prescribe         CONTINUE these medications which have NOT CHANGED    Details   calcium carbonate (OS-MARI) 600 MG tablet Take 600 mg by mouth 2 times daily  (with meals), Historical      cholecalciferol (VITAMIN D3) 125 mcg (5000 units) capsule Take 125 mcg by mouth 2 times daily Winter time dose, Historical      Coenzyme Q10 300 MG CAPS Take 300 mg % by mouth every morning, Historical      Glucosamine-Chondroit-Vit C-Mn (GLUCOSAMINE CHONDR 1500 COMPLX PO) Take 2 tablets by mouth every morning Brand name: Reji Salomon, Historical      magnesium oxide (MAG-OX) 400 (240 Mg) MG tablet Take 400 mg by mouth daily, Historical         STOP taking these medications       oxyCODONE (ROXICODONE) 5 MG tablet Comments:   Reason for Stopping:                     Consultations:   Medicine, PT, OT          Brief History of Illness:   70-year-old female with right knee osteoarthritis refractory to nonoperative treatment.  After discussing the risks, benefits, and alternatives to surgery the patient elected to proceed with right total knee arthroplasty           Hospital Course:   On 2/11/2020 she underwent right total knee arthroplasty.  Surgery was uncomplicated.  A Hemovac drain was placed intraoperatively.  The hospitalist was consulted postoperatively to aid in management of medical comorbidities.  Given her clotting disorder, she was transfused 1 unit of platelets prophylactically on postoperative day 1 at the discretion of the medicine team.  She began working with physical and occupational therapy on postoperative day 1.  She tolerated a regular diet without GI distress, nausea, or vomiting, and was voiding spontaneously.  All PT/OT goals for safe discharge were met.  Her pain was controlled on oral medications.  On postoperative day 2 she was again prophylactically transfused 1 unit of platelets.  Her Hemovac drain was removed on postoperative day 2.  On 2/13/2021 her pain was well controlled on oral medications, voiding independently, and has been cleared by physical therapy and medicine for discharge to home.    Exam at time of Discharge:   Gen: No acute distress, resting  "comfortably in bed.  Resp: Non-labored breathing     RLE  - Dressing CDI  - Fires quad, TA, gastroc/soleus, EHL, FHL, wiggles toes  - SILT to superficial peroneal, deep peroneal, saphenous, sural, and tibial nerve distributions  - 2+ DP and PT, foot warm and well perfused    DVT prophylaxis: Apixaban  Blood Transfusion: 2 units of platelets during hospitalization, prophylactically          Discharge Instructions and Follow-Up:     Discharge Procedure Orders   Reason for your hospital stay   Order Comments: You were hospitalized following your right knee replacement.     When to call - Contact Surgeon Team   Order Comments: You may experience symptoms that require follow-up before your scheduled appointment. Refer to the \"[employer] Tool\" for instructions on when to contact your Surgeon Team if you are concerned about pain control, blood clots, constipation, or if you are unable to urinate.     When to call - Reach out to Urgent Care   Order Comments: If you are not able to reach your Surgeon Team and you need immediate care, go to the Orthopedic Walk-in Clinic or Urgent Care at your Surgeon's office.  Do NOT go to the Emergency Room unless you have shortness of breath, chest pain, or other signs of a medical emergency.     When to call - Reasons to Call 911   Order Comments: Call 911 immediately if you experience sudden-onset chest pain, arm weakness/numbness, slurred speech, or shortness of breath     Discharge Instruction - Breathing exercises   Order Comments: Perform breathing exercises using your Incentive Spirometer 10 times per hour while awake for 2 weeks.     Symptoms - Fever Management   Order Comments: A low grade fever can be expected after surgery.  Use acetaminophen (TYLENOL) as needed for fever management.  Contact your Surgeon Team if you have a fever greater than 101.5 F, chills, and/or night sweats.     Symptoms - Constipation management   Order Comments: Constipation (hard, dry bowel movements) is " expected after surgery due to the combination of being less active, the anesthetic, and the opioid pain medication.  You can do the following to help reduce constipation:  ~  FLUIDS:  Drink clear liquids (water or Gatorade), or juice (apple/prune).  ~  DIET:  Eat a fiber rich diet.    ~  ACTIVITY:  Get up and move around several times a day.  Increase your activity as you are able.  MEDICATIONS:  Reduce the risk of constipation by starting medications before you are constipated.  You can take Miralax   (1 packet as directed) and/or a stool softener (Senokot 1-2 tablets 1-2 times a day).  If you already have constipation and these medications are not working, you can get magnesium citrate and use as directed.  If you continue to have constipation you can try an over the counter suppository or enema.  Call your Surgeon Team if it has been greater than 3 days since your last bowel movement.     Symptoms - Reduced Urine Output   Order Comments: Changes in the amount of fluids you drank before and after surgery may result in problems urinating.  It is important to stay well-hydrated after surgery and drink plenty of water. If it has been greater than 8 hours since you have urinated despite drinking plenty of water, call your Surgeon Team.     Activity - Exercises to prevent blood clots   Order Comments: Unless otherwise directed by your Surgeon team, perform the following exercises at least three times per day for the first four weeks after surgery to prevent blood clots in your legs: 1) Point and flex your feet (Ankle Pumps), 2) Move your ankle around in big circles, 3) Wiggle your toes, 4) Walk, even for short distances, several times a day, will help decrease the risk of blood clots.     Order Specific Question Answer Comments   Is discharge order? Yes      Comfort and Pain Management - Pain after Surgery   Order Comments: Pain after surgery is normal and expected.  You will have some amount of pain for several weeks  after surgery.  Your pain will improve with time.  There are several things you can do to help reduce your pain including: rest, ice, elevation, and using pain medications as needed. Contact your Surgeon Team if you have pain that persists or worsens after surgery despite rest, ice, elevation, and taking your medication(s) as prescribed. Contact your Surgeon Team if you have new numbness, tingling, or weakness in your operative extremity.     Comfort and Pain Management - Swelling after Surgery   Order Comments: Swelling and/or bruising of the surgical extremity is common and may persist for several months after surgery. In addition to frequent icing and elevation, gentle compressive support with an ACE wrap or tubigrip may help with swelling. Apply compression regularly, removing at least twice daily to perform skin checks. Contact your Surgeon Team if your swelling increases and is NOT associated with an increase in your activity level, or if your swelling increases and is associated with redness and pain.     Comfort and Pain Management - Cold therapy   Order Comments: Ice can be used to control swelling and discomfort after surgery. Place a thin towel over your operative site and apply the ice pack overtop. Leave ice pack in place for 20 minutes, then remove for 20 minutes. Repeat this 20 minutes on/20 minutes off routine as often as tolerated.     Medication Instructions - Acetaminophen (TYLENOL) Instructions   Order Comments: You were discharged with acetaminophen (TYLENOL) for pain management after surgery. Acetaminophen most effectively manages pain symptoms when it is taken on a schedule without missing doses (every four, six, or eight hours). Your Provider will prescribe a safe daily dose between 3000 - 4000 mg.  Do NOT exceed this daily dose. Most patients use acetaminophen for pain control for the first four weeks after surgery.  You can wean from this medication as your pain decreases.     Medication  Instructions - NSAID Instructions   Order Comments: You were discharged with an anti-inflammatory medication for pain management to use in combination with acetaminophen (TYLENOL) and the narcotic pain medication.  Take this medication exactly as directed.  You should only take one anti-inflammatory at a time.  Some common anti-inflammatories include: ibuprofen (ADVIL, MOTRIN), naproxen (ALEVE, NAPROSYN), celecoxib (CELEBREX), meloxicam (MOBIC), ketorolac (TORADOL).  Take this medication with food and water.     Medication Instructions - Opioids - Tapering Instructions   Order Comments: In the first three days following surgery, your symptoms may warrant use of the narcotic pain medication every four to six hours as prescribed. This is normal. As your pain symptoms improve, focus your efforts on decreasing (tapering) use of narcotic medications. The most successful tapering strategy is to first, decrease the number of tablets you take every 4-6 hours to the minimum prescribed. Then, increase the amount of time between doses.  For example:  First, taper to   or 1 tablet every 4-6 hours.  Then, taper to   or 1 tablet every 6-8 hours.  Then, taper to   or 1 tablet every 8-10 hours.  Then, taper to   or 1 tablet every 10-12 hours.  Then, taper to   or 1 tablet at bedtime.  The bedtime dose can help with comfort during sleep and is typically the last dose to be discontinued after surgery.     Follow Up Care   Order Comments: Follow-up with your Surgeon Team in 2 weeks for wound check.     Medication instructions - Anticoagulation - other   Order Comments: Take the apixiban (Eliquis) as prescribed, starting with first dose 48 hours after surgery and continue for 14 days.  This is given to help minimize your risk of blood clot     Comfort and Pain Management - LOWER Extremity Elevation   Order Comments: Swelling is expected for several months after surgery. This type of swelling is usually associated with gravity and  "activity, and can be improved with elevation.   The best way to do this is to get your \"toes above your nose\" by laying down and placing several pillows lengthwise under your calf and heel. When elevating your leg keep your knee completely straight. Perform this elevation as often as possible especially for the first two weeks after surgery.     Medication Instructions - Opioid Instructions (Greater than or equal to 65 years)   Order Comments: You were discharged with an opioid medication (hydromorphone, oxycodone, hydrocodone, or tramadol). This medication should only be taken for breakthrough pain that is not controlled with acetaminophen (TYLENOL). If you rate your pain less than 3 you do not need this medication.  Pain rating 0-3:  You do not need this medication  Pain rating 4-6:  Take 1/2 tablet every 4-6 hours as needed  Pain rating 7-10:  Take 1 tablet every 4-6 hours as needed  Do not exceed 4 tablets per day     Activity - Total Knee Arthroplasty     Order Specific Question Answer Comments   Is discharge order? Yes      Return to Driving   Order Comments: Return to driving - Driving is NOT permitted until directed by your provider. Under no circumstance are you permitted to drive while using narcotic pain medications.     Order Specific Question Answer Comments   Is discharge order? Yes      Dressing / Wound Care - Wound   Order Comments: WOUND CARE  Wound care:  You have a clean Tegaderm dressing on your surgical wound that was changed prior to leaving the hospital. This dressing should stay in place for seven days to allow the incision to heal. If the Tegaderm dressing becomes excessively wet or saturated before removal on day seven (>30% saturated or liquid blood pooling under the clear Tegaderm), please contact Dr. Rodriguez's office. If you notice any new wound drainage after 3-4 days following surgery, contact Dr. Rodriguez's office. After seven days, remove the dressing and leave the wound open to air (see " showering instructions below). If there is any drainage from the incision after removal of the Tegaderm, dress the wound with sterile 4x4 gauze, using tape over top to secure the dressing in place over the incision, and contact Dr. Rodriguez's office right away. Please contact Dr. Rodriguez's office if you notice the following after removal of the Tegaderm dressin) significant cloudy, bloody, or malodorous drainage from the incision, 2) excessive warmth and redness around the incision.      CONTACTING YOUR PHYSICIAN:  You may experience symptoms that require follow-up before your scheduled two week appointment. Please contact Dr. Rodriguez's office if you experience:  1) Pain in your knee that persists or worsens in the first few days after surgery  2) Excessive redness or drainage of cloudy or bloody material from the wounds (Clear red tinted fluid and some mild drainage should be expected) or drainage of any kind five days after surgery  3) A temperature elevation greater than 101.5    4) Pain, swelling or redness in your calf  5) Numbness or weakness in your leg or foot      Regular business hours (Monday - Friday, 8am - 5pm):  [facility]: [phone]  [facility]: [phone]  [facility]: [phone]  [facility]: [phone]    After hours and weekends:  [facility] on call Orthopedic resident: [phone]     Dressing / Wound Care - NO Tub Bathing   Order Comments: Tub bathing, swimming, or any other activities that will cause your incision to be submerged in water should be avoided until allowed by your Surgeon.     Return to School / Work   Order Comments: Return to School / Work: You may return to work/school when directed by your Provider.     Order Specific Question Answer Comments   Is discharge order? Yes      NO Precautions   Order Comments: No precautions directed by your Provider.  You may  perform range of motion activities as tolerated.     Order Specific Question Answer Comments   Is discharge order? Yes      Weight bearing as tolerated   Order Comments: Weight bearing as tolerated on your operative extremity.     Order Specific Question Answer Comments   Is discharge order? Yes      Continuous Passive Motion Machine   Order Comments: Flexion (degrees): 90  Extension (degrees): 0  Advance CPM (degrees): 10 per day as tolerated, to a maximum of 100  Day of Surgery, Start immediately     Dressing / Wound care - Shower with wound/dressing covered   Order Comments: You must COVER your dressing or incision with saran wrap (or any other non-permeable covering) to allow the incision/dressing to remain dry while showering.  You may shower in this fashion 2 days after surgery as long as the surgical wound stays dry. Continue to cover your dressing or incision for showering until your first office visit.  You are strictly prohibited from soaking or submerging the surgical wound underwater.     Crutches DME   Order Comments: DME Documentation: Describe the reason for need to support medical necessity: Impaired gait status post knee surgery. I, the undersigned, certify that the above prescribed supplies are medically necessary for this patient and is both reasonable and necessary in reference to accepted standards of medical practice in the treatment of this patient's condition and is not prescribed as a convenience.     Order Specific Question Answer Comments   DME Provider: [facility]    Crutch Type: Standard    Crutches Add On: NA    Length of Need: Lifetime      Cane DME   Order Comments: DME Documentation: Describe the reason for need to support medical necessity: Impaired gait status post knee surgery. I, the undersigned, certify that the above prescribed supplies are medically necessary for this patient and is both reasonable and necessary in reference to accepted standards of medical practice in the  treatment of this patient's condition and is not prescribed as a convenience.     Order Specific Question Answer Comments   DME Provider: [facility]    Cane Type: Single Tip    Reminder: Patient can typically get 1 every 5 years      Walker DME   Order Comments: DME Documentation: Describe the reason for need to support medical necessity: Impaired gait status post knee surgery. I, the undersigned, certify that the above prescribed supplies are medically necessary for this patient and is both reasonable and necessary in reference to accepted standards of medical practice in the treatment of this patient's condition and is not prescribed as a convenience.     Order Specific Question Answer Comments   DME Provider: [facility]    Walker Type: Standard (2 Wheel)    Accessories: N/A      Discharge Instruction - Regular Diet Adult   Order Comments: Return to your pre-surgery diet unless instructed otherwise     Order Specific Question Answer Comments   Is discharge order? Yes               Discharge Disposition:     Discharged to home      Patient was discussed with Dr. Rodriguez on day of discharge.    Signed,  Roshan Swartz MD  PGY-4, Orthopaedic Surgery

## 2021-03-01 ENCOUNTER — ANCILLARY PROCEDURE (OUTPATIENT)
Dept: GENERAL RADIOLOGY | Facility: CLINIC | Age: 71
End: 2021-03-01
Attending: NURSE PRACTITIONER
Payer: COMMERCIAL

## 2021-03-01 ENCOUNTER — ANCILLARY PROCEDURE (OUTPATIENT)
Dept: GENERAL RADIOLOGY | Facility: CLINIC | Age: 71
End: 2021-03-01
Attending: ORTHOPAEDIC SURGERY
Payer: COMMERCIAL

## 2021-03-01 ENCOUNTER — OFFICE VISIT (OUTPATIENT)
Dept: ORTHOPEDICS | Facility: CLINIC | Age: 71
End: 2021-03-01
Payer: COMMERCIAL

## 2021-03-01 DIAGNOSIS — Z96.653 STATUS POST TOTAL BILATERAL KNEE REPLACEMENT: Primary | ICD-10-CM

## 2021-03-01 DIAGNOSIS — Z09 POSTOPERATIVE FOLLOW-UP: ICD-10-CM

## 2021-03-01 DIAGNOSIS — M17.0 TRICOMPARTMENT OSTEOARTHRITIS OF BOTH KNEES: ICD-10-CM

## 2021-03-01 DIAGNOSIS — Z96.652 HISTORY OF TOTAL KNEE ARTHROPLASTY, LEFT: ICD-10-CM

## 2021-03-01 DIAGNOSIS — Z96.651 HX OF TOTAL KNEE ARTHROPLASTY, RIGHT: Primary | ICD-10-CM

## 2021-03-01 DIAGNOSIS — Z96.651 AFTERCARE FOLLOWING RIGHT KNEE JOINT REPLACEMENT SURGERY: ICD-10-CM

## 2021-03-01 DIAGNOSIS — Z47.1 AFTERCARE FOLLOWING RIGHT KNEE JOINT REPLACEMENT SURGERY: ICD-10-CM

## 2021-03-01 PROCEDURE — 77073 BONE LENGTH STUDIES: CPT | Performed by: RADIOLOGY

## 2021-03-01 PROCEDURE — 99024 POSTOP FOLLOW-UP VISIT: CPT | Performed by: NURSE PRACTITIONER

## 2021-03-01 PROCEDURE — 73560 X-RAY EXAM OF KNEE 1 OR 2: CPT | Mod: XU | Performed by: RADIOLOGY

## 2021-03-01 RX ORDER — HYDROXYZINE HYDROCHLORIDE 25 MG/1
25 TABLET, FILM COATED ORAL EVERY 6 HOURS PRN
Qty: 40 TABLET | Refills: 0 | Status: SHIPPED | OUTPATIENT
Start: 2021-03-01 | End: 2021-04-12

## 2021-03-01 RX ORDER — GABAPENTIN 300 MG/1
300 CAPSULE ORAL 3 TIMES DAILY
Qty: 90 CAPSULE | Refills: 0 | Status: SHIPPED | OUTPATIENT
Start: 2021-03-01

## 2021-03-01 RX ORDER — CEPHALEXIN 500 MG/1
500 CAPSULE ORAL 2 TIMES DAILY
Qty: 28 CAPSULE | Refills: 0 | Status: SHIPPED | OUTPATIENT
Start: 2021-03-01 | End: 2021-03-15

## 2021-03-01 RX ORDER — GABAPENTIN 300 MG/1
300 CAPSULE ORAL 3 TIMES DAILY
Qty: 90 CAPSULE | Refills: 0 | Status: SHIPPED | OUTPATIENT
Start: 2021-03-01 | End: 2021-04-05

## 2021-03-01 NOTE — NURSING NOTE
Reason For Visit:   Chief Complaint   Patient presents with     RECHECK     2 week POP RTKA DOS: 2/11/21       There were no vitals taken for this visit.    Pain Assessment  Patient Currently in Pain: Yes  0-10 Pain Scale: 5  Primary Pain Location: Knee    Urvashi Gill ATC

## 2021-03-01 NOTE — LETTER
"    3/1/2021         RE: Maria Luisa Garcia  [address] [address]        Dear Colleague,    Thank you for referring your patient, Maria Luisa Garcia, to the [facility] [place]. Please see a copy of my visit note below.    DOS: 2/11/2021 R TKA    Maria Luisa is seen today in follow up of right total knee replacement. She is progressing well but feels \"this one is worsen than the other one\". She has been \"working really hard in therapy\" and had an episode last week after working with the therapist where \"the pain was unbearable and felt like horrific nerve pain affecting her entire leg\". This has improved some but \"can't find a position of comfort\". The proximal incision has an area of redness with small area of granulation that appears to be a superficial stitch reaction. It is mildly tender to palpate. No active drainage or purulence noted. The rest of the incision is healing uneventfully. No calf pain. ROM:0-112. Quad strength is 5/5. Walking without a limp and no assistive devices.    Xrays taken today show ideal position of the tibial and femoral components without lucency and alignment is ideal. Full length has no leg length difference with neutral alignment to bilateral lower extremities.    Dx:  1. R TKA    Plan:  1. Incision was cleaned with chlorahexadeine and steri strips reapplied. Prescribe keflex, neurontin, and vistaril for pain.  2. Follow up via [employer] message in 1 week for wound discussion or sooner if incision starts to drain or worsen.  3. Follow up in clinic in 1 month for bilateral 2v knee xrays on arrival.    Angeline Whaley, APRN CNP  "

## 2021-03-01 NOTE — PROGRESS NOTES
"DOS: 2/11/2021 R TKA    Maria Luisa is seen today in follow up of right total knee replacement. She is progressing well but feels \"this one is worsen than the other one\". She has been \"working really hard in therapy\" and had an episode last week after working with the therapist where \"the pain was unbearable and felt like horrific nerve pain affecting her entire leg\". This has improved some but \"can't find a position of comfort\". The proximal incision has an area of redness with small area of granulation that appears to be a superficial stitch reaction. It is mildly tender to palpate. No active drainage or purulence noted. The rest of the incision is healing uneventfully. No calf pain. ROM:0-112. Quad strength is 5/5. Walking without a limp and no assistive devices.    Xrays taken today show ideal position of the tibial and femoral components without lucency and alignment is ideal. Full length has no leg length difference with neutral alignment to bilateral lower extremities.    Dx:  1. R TKA    Plan:  1. Incision was cleaned with chlorahexadeine and steri strips reapplied. Prescribe keflex, neurontin, and vistaril for pain.  2. Follow up via [email or web address] message in 1 week for wound discussion or sooner if incision starts to drain or worsen.  3. Follow up in clinic in 1 month for bilateral 2v knee xrays on arrival.  "

## 2021-03-08 ENCOUNTER — OFFICE VISIT (OUTPATIENT)
Dept: ORTHOPEDICS | Facility: CLINIC | Age: 71
End: 2021-03-08
Payer: COMMERCIAL

## 2021-03-08 DIAGNOSIS — M17.0 TRICOMPARTMENT OSTEOARTHRITIS OF BOTH KNEES: ICD-10-CM

## 2021-03-08 PROCEDURE — 99024 POSTOP FOLLOW-UP VISIT: CPT | Performed by: NURSE PRACTITIONER

## 2021-03-08 RX ORDER — CELECOXIB 100 MG/1
200 CAPSULE ORAL 2 TIMES DAILY
Qty: 20 CAPSULE | Refills: 3 | Status: SHIPPED | OUTPATIENT
Start: 2021-03-08

## 2021-03-08 NOTE — PROGRESS NOTES
DOS: 2/11/2021 R TKA    Maria Luisa is seen today in follow up of her surgical procedure as above. Incision continue to have a quarter size area of erythema mid incision but no drainage and incision is healed. Proximal incision has continued stitch reaction with fibrous tissue. No purulence. Erythema surrounding it dime size in nature. No focal areas of tenderness noted. Mild knee effusion. She has been on keflex for 1 week. She denies fevers, chills, or shortness of breath.    Dr. Rodriguez debrided proximal incision today and reapplied steri strips with 4x4. She will continue 1 more week of keflex and do a follow up phone conversation with him in 1 week.

## 2021-03-08 NOTE — LETTER
3/8/2021         RE: Maria Luisa Garcia  [address]  [place] [address]        Dear Colleague,    Thank you for referring your patient, Maria Luisa Garcia, to the [facility] [place]. Please see a copy of my visit note below.    DOS: 2/11/2021 R TKA    Maria Luisa is seen today in follow up of her surgical procedure as above. Incision continue to have a quarter size area of erythema mid incision but no drainage and incision is healed. Proximal incision has continued stitch reaction with fibrous tissue. No purulence. Erythema surrounding it dime size in nature. No focal areas of tenderness noted. Mild knee effusion. She has been on keflex for 1 week. She denies fevers, chills, or shortness of breath.    Dr. Rodriguez debrided proximal incision today and reapplied steri strips with 4x4. She will continue 1 more week of keflex and do a follow up phone conversation with him in 1 week.    Again, thank you for allowing me to participate in the care of your patient.        Sincerely,        RAMON Rosen CNP

## 2021-03-08 NOTE — NURSING NOTE
Reason For Visit:   Chief Complaint   Patient presents with     RECHECK     wound check        There were no vitals taken for this visit.         Urvashi Gill, ATC

## 2021-03-15 DIAGNOSIS — Z96.651 HX OF TOTAL KNEE ARTHROPLASTY, RIGHT: ICD-10-CM

## 2021-03-15 RX ORDER — OXYCODONE HYDROCHLORIDE 5 MG/1
5 TABLET ORAL EVERY 6 HOURS PRN
Qty: 20 TABLET | Refills: 0 | Status: SHIPPED | OUTPATIENT
Start: 2021-03-15 | End: 2021-03-18

## 2021-03-15 RX ORDER — CEPHALEXIN 500 MG/1
500 CAPSULE ORAL 2 TIMES DAILY
Qty: 28 CAPSULE | Refills: 0 | Status: SHIPPED | OUTPATIENT
Start: 2021-03-15

## 2021-03-19 ASSESSMENT — ENCOUNTER SYMPTOMS
MUSCLE CRAMPS: 0
STIFFNESS: 0
NECK PAIN: 0
FEVER: 0
HALLUCINATIONS: 0
BACK PAIN: 0
DECREASED APPETITE: 0
JOINT SWELLING: 0
ARTHRALGIAS: 0
POLYPHAGIA: 0
MUSCLE WEAKNESS: 0
INCREASED ENERGY: 0
WEIGHT GAIN: 0
NIGHT SWEATS: 0
POLYDIPSIA: 0
MYALGIAS: 0
CHILLS: 0
ALTERED TEMPERATURE REGULATION: 0
FATIGUE: 0

## 2021-03-22 ENCOUNTER — OFFICE VISIT (OUTPATIENT)
Dept: ORTHOPEDICS | Facility: CLINIC | Age: 71
End: 2021-03-22
Payer: COMMERCIAL

## 2021-03-22 ENCOUNTER — ANCILLARY PROCEDURE (OUTPATIENT)
Dept: GENERAL RADIOLOGY | Facility: CLINIC | Age: 71
End: 2021-03-22
Attending: NURSE PRACTITIONER
Payer: COMMERCIAL

## 2021-03-22 DIAGNOSIS — T81.41XA INFECTION INVOLVING STITCH WITH ABSCESS: Primary | ICD-10-CM

## 2021-03-22 DIAGNOSIS — Z96.651 STATUS POST TOTAL RIGHT KNEE REPLACEMENT: ICD-10-CM

## 2021-03-22 DIAGNOSIS — Z96.653 STATUS POST TOTAL BILATERAL KNEE REPLACEMENT: ICD-10-CM

## 2021-03-22 PROCEDURE — 73560 X-RAY EXAM OF KNEE 1 OR 2: CPT | Mod: LT | Performed by: RADIOLOGY

## 2021-03-22 PROCEDURE — 99024 POSTOP FOLLOW-UP VISIT: CPT | Performed by: NURSE PRACTITIONER

## 2021-03-22 NOTE — LETTER
3/22/2021         RE: Maria Luisa Garcia  [address]  [place] [address]        Dear Colleague,    Thank you for referring your patient, Maria Luisa Garcia, to the [facility] [place]. Please see a copy of my visit note below.    DOS: 2/11/2021 R TKA    Maria Luisa is seen today in follow up of her continued proximal stitch reaction. She is still on keflex four times a day. There is a visible stitch on the proximal part of the incision. It was removed today without difficulty and there is fibrous tissue that was removed today. The incision looks healthy. No purulence or focal areas of tenderness to palpate. She has no pain.  ROM:0-124. No calf pain. Quad strength 5/5.     Dx:  1. R TKA  2. Stitch abcess/dehisee proximal incision    Plan:  1. She will continue keflex for another week. Keep clean and dry.  2. Follow up in 2 weeks for a wound check.      Again, thank you for allowing me to participate in the care of your patient.        Sincerely,        Angeline Whaley, RAMON CNP

## 2021-03-22 NOTE — NURSING NOTE
Reason For Visit:   Chief Complaint   Patient presents with     RECHECK     wound check s.p Right TKA DOS: 2/1/21       There were no vitals taken for this visit.    Pain Assessment  Patient Currently in Pain: Yes  0-10 Pain Scale: 2  Primary Pain Location: Knee    Urvashi Gill ATC

## 2021-03-23 NOTE — PROGRESS NOTES
DOS: 2/11/2021 R TKA    Maria Luisa is seen today in follow up of her continued proximal stitch reaction. She is still on keflex four times a day. There is a visible stitch on the proximal part of the incision. It was removed today without difficulty and there is fibrous tissue that was removed today. The incision looks healthy. No purulence or focal areas of tenderness to palpate. She has no pain.  ROM:0-124. No calf pain. Quad strength 5/5.     Dx:  1. R TKA  2. Stitch abcess/dehisee proximal incision    Plan:  1. She will continue keflex for another week. Keep clean and dry.  2. Follow up in 2 weeks for a wound check.

## 2021-04-05 ENCOUNTER — OFFICE VISIT (OUTPATIENT)
Dept: ORTHOPEDICS | Facility: CLINIC | Age: 71
End: 2021-04-05
Payer: COMMERCIAL

## 2021-04-05 DIAGNOSIS — Z96.651 HX OF TOTAL KNEE ARTHROPLASTY, RIGHT: ICD-10-CM

## 2021-04-05 PROCEDURE — 99024 POSTOP FOLLOW-UP VISIT: CPT | Performed by: NURSE PRACTITIONER

## 2021-04-05 RX ORDER — GABAPENTIN 300 MG/1
300 CAPSULE ORAL 3 TIMES DAILY
Qty: 90 CAPSULE | Refills: 0 | Status: SHIPPED | OUTPATIENT
Start: 2021-04-05 | End: 2021-04-12

## 2021-04-05 NOTE — NURSING NOTE
Reason For Visit:   Chief Complaint   Patient presents with     RECHECK     Wound Check        There were no vitals taken for this visit.    Pain Assessment  Patient Currently in Pain: Yes  Primary Pain Location: Knee    Urvashi Gill ATC

## 2021-04-05 NOTE — LETTER
"    4/5/2021         RE: Maria Luisa Garcia  [address]  [place] [address]        Dear Colleague,    Thank you for referring your patient, Maria Luisa Garcia, to the [facility] [place]. Please see a copy of my visit note below.    DOS: 2/11/2021 R TKA    Maria Luisa is seen today in follow up for a wound check. She reports \"still mild oozing\" to distal incision. She is off antibiotics now. She has no focal areas of discomfort and her knee \"feels fantastic\". Afebrile. Granulation tissue noted. No purulence.    Will check her wound in 1 week. If wound continues to not heal would consider a simple procedure at the [facility] for debridement and distal wound closure.    Sincerely,        Angeline Whaley, RAMON CNP    "

## 2021-04-05 NOTE — PROGRESS NOTES
"DOS: 2/11/2021 MULUGETA Glass is seen today in follow up for a wound check. She reports \"still mild oozing\" to distal incision. She is off antibiotics now. She has no focal areas of discomfort and her knee \"feels fantastic\". Afebrile. Granulation tissue noted. No purulence.    Will check her wound in 1 week. If wound continues to not heal would consider a simple procedure at the [facility] for debridement and distal wound closure.  "

## 2021-04-12 ENCOUNTER — OFFICE VISIT (OUTPATIENT)
Dept: ORTHOPEDICS | Facility: CLINIC | Age: 71
End: 2021-04-12
Payer: COMMERCIAL

## 2021-04-12 DIAGNOSIS — Z96.653 STATUS POST TOTAL BILATERAL KNEE REPLACEMENT: Primary | ICD-10-CM

## 2021-04-12 DIAGNOSIS — M17.0 TRICOMPARTMENT OSTEOARTHRITIS OF BOTH KNEES: ICD-10-CM

## 2021-04-12 PROCEDURE — 99024 POSTOP FOLLOW-UP VISIT: CPT | Performed by: NURSE PRACTITIONER

## 2021-04-12 RX ORDER — HYDROXYZINE HYDROCHLORIDE 25 MG/1
25 TABLET, FILM COATED ORAL EVERY 6 HOURS PRN
Qty: 40 TABLET | Refills: 1 | Status: SHIPPED | OUTPATIENT
Start: 2021-04-12

## 2021-04-12 RX ORDER — AMOXICILLIN 500 MG/1
TABLET, FILM COATED ORAL
Qty: 24 TABLET | Refills: 3 | Status: SHIPPED | OUTPATIENT
Start: 2021-04-12

## 2021-04-12 NOTE — PROGRESS NOTES
"DOS: 2/11/2021 R TKA    Maria Luisa is seen today in follow up of her total knee and wound dehisence of her incision. She reports \"it has turned the corner and have not had any drainage for the last 3-4 day\". No focal areas of tenderness or erythema. ROM:0-126. Quad strength symmetrical at 5/5. No calf pain. Walks without a limp or cane. Afebrile.    Dx:  1. Bilateral TKA    Plan:  1. Her follow up is prn. Plan was discussed with Dr. Rodriguez who agrees with plan.  "

## 2021-04-12 NOTE — LETTER
"    4/12/2021         RE: Maria Luisa Garcia  [address] [address]        Dear Colleague,    Thank you for referring your patient, Maria Luisa Garcia, to the [facility] [place]. Please see a copy of my visit note below.    DOS: 2/11/2021 R TKA    Maria Luisa is seen today in follow up of her total knee and wound dehisence of her incision. She reports \"it has turned the corner and have not had any drainage for the last 3-4 day\". No focal areas of tenderness or erythema. ROM:0-126. Quad strength symmetrical at 5/5. No calf pain. Walks without a limp or cane. Afebrile.    Dx:  1. Bilateral TKA    Plan:  1. Her follow up is prn. Plan was discussed with Dr. Rodriguez who agrees with plan.    Again, thank you for allowing me to participate in the care of your patient.        Sincerely,        Angeline Whaley, RAMON CNP    "

## 2021-05-09 ENCOUNTER — HEALTH MAINTENANCE LETTER (OUTPATIENT)
Age: 71
End: 2021-05-09

## 2021-05-28 ENCOUNTER — RECORDS - HEALTHEAST (OUTPATIENT)
Dept: ADMINISTRATIVE | Facility: CLINIC | Age: 71
End: 2021-05-28

## 2021-06-03 RX ORDER — HYDROXYZINE PAMOATE 25 MG/1
25 CAPSULE ORAL 3 TIMES DAILY PRN
Status: CANCELLED | OUTPATIENT
Start: 2021-06-03

## 2021-06-04 DIAGNOSIS — Z41.9 SURGERY, ELECTIVE: Primary | ICD-10-CM

## 2021-06-04 RX ORDER — HYDROXYZINE PAMOATE 25 MG/1
25 CAPSULE ORAL 3 TIMES DAILY PRN
Qty: 40 CAPSULE | Refills: 0 | Status: SHIPPED | OUTPATIENT
Start: 2021-06-04

## 2021-06-04 RX ORDER — HYDROXYZINE PAMOATE 25 MG/1
25 CAPSULE ORAL EVERY 6 HOURS
Qty: 20 CAPSULE | Refills: 0 | Status: SHIPPED | OUTPATIENT
Start: 2021-06-04

## 2021-06-08 ENCOUNTER — TELEPHONE (OUTPATIENT)
Dept: ORTHOPEDICS | Facility: CLINIC | Age: 71
End: 2021-06-08

## 2021-06-08 NOTE — TELEPHONE ENCOUNTER
[facility] Call Center    Phone Message    May a detailed message be left on voicemail: yes     Reason for Call: Other: Patient needs Her PT order (prev Dr Rodriguez patient, she spoke with him at hospital and She thinks someone at hospital entered it into epic, though not sure) to be faxed to [facility].  [phone]/ fax [phone]     Action Taken: Message routed to:  [facility] ([facility] Ortho    Travel Screening: Not Applicable

## 2021-06-10 NOTE — PROGRESS NOTES
Orders for Inpatient Hematology Consult and Platelets to have on hand day of surgery were entered into Epic to be available for pt's Right total knee replacement surgery on 2/11/21 with Dr Rodriguez.  Roseline Spencer RN  
[FreeTextEntry1] : DFU 3 plantar right 1st metatarsal head , MRI + for OM

## 2021-10-24 ENCOUNTER — HEALTH MAINTENANCE LETTER (OUTPATIENT)
Age: 71
End: 2021-10-24

## 2022-06-05 ENCOUNTER — HEALTH MAINTENANCE LETTER (OUTPATIENT)
Age: 72
End: 2022-06-05

## 2022-10-15 ENCOUNTER — HEALTH MAINTENANCE LETTER (OUTPATIENT)
Age: 72
End: 2022-10-15

## 2023-04-25 ENCOUNTER — OFFICE VISIT (OUTPATIENT)
Dept: ORTHOPEDICS | Facility: CLINIC | Age: 73
End: 2023-04-25

## 2023-04-25 ENCOUNTER — HOSPITAL ENCOUNTER (OUTPATIENT)
Dept: GENERAL RADIOLOGY | Facility: CLINIC | Age: 73
Discharge: HOME | End: 2023-04-25
Attending: ORTHOPAEDIC SURGERY | Admitting: ORTHOPAEDIC SURGERY
Payer: OTHER MISCELLANEOUS

## 2023-04-25 DIAGNOSIS — T14.90XA BLUNT TRAUMA: Primary | ICD-10-CM

## 2023-04-25 PROCEDURE — 73130 X-RAY EXAM OF HAND: CPT | Mod: RT

## 2023-04-25 PROCEDURE — 99207 PR NO CHARGE LOS: CPT | Performed by: ORTHOPAEDIC SURGERY

## 2023-04-25 NOTE — LETTER
4/25/2023         RE: Maria Luisa Garcia  [address]  [place] [address]        Dear Colleague,    Thank you for referring your patient, Maria Luisa Garcia, to the [facility] [place]. Please see a copy of my visit note below.    Attending note:    Pt sustained blunt trauma to R non-dominant hand lateral border 6 days ago while at work after falling onto floor after tripping.  No other injuries.       Exam:  Marked ecchymosis along ulnar border of hand.  No displaced deformity or focal tenderness on bone.    IMP: rule out fx vs soft tissue injury    PLAN: XR of R hand    MD Ester Lane Family Professor  Oncology and Adult Reconstructive Surgery  Dept Orthopaedic Surgery, [facility]  [phone] office, [phone] pager  [email or web address]

## 2023-04-25 NOTE — PROGRESS NOTES
Attending note:    Pt sustained blunt trauma to R non-dominant hand lateral border 6 days ago while at work after falling onto floor after tripping.  No other injuries.       Exam:  Marked ecchymosis along ulnar border of hand.  No displaced deformity or focal tenderness on bone.    IMP: rule out fx vs soft tissue injury    PLAN: XR of R hand    MD Ester Lane Family Professor  Oncology and Adult Reconstructive Surgery  Dept Orthopaedic Surgery, [facility]  [phone] office, [phone] pager  [email or web address]     Glycopyrrolate Counseling:  I discussed with the patient the risks of glycopyrrolate including but not limited to skin rash, drowsiness, dry mouth, difficulty urinating, and blurred vision.

## 2023-06-11 ENCOUNTER — HEALTH MAINTENANCE LETTER (OUTPATIENT)
Age: 73
End: 2023-06-11

## 2023-10-29 ENCOUNTER — HEALTH MAINTENANCE LETTER (OUTPATIENT)
Age: 73
End: 2023-10-29

## 2024-08-04 ENCOUNTER — HEALTH MAINTENANCE LETTER (OUTPATIENT)
Age: 74
End: 2024-08-04

## 2024-12-12 ENCOUNTER — MYC MEDICAL ADVICE (OUTPATIENT)
Dept: ORTHOPEDICS | Facility: CLINIC | Age: 74
End: 2024-12-12

## 2024-12-17 NOTE — TELEPHONE ENCOUNTER
- A call was placed to the patient. Patient did not answer phone so a voicemail was left.     - Call back number to clinic was given and patient was told to please look at reply to [employer] message.     ------------------  - Patient requested refill for   AMOXICILLIN 500MG TABLETS [id number] No   Written Date Last Fill Date Quantity Days Supply   12/7/2024 8/21/2024 24 tablet 6   Sig Notes   TAKE 4 TABLETS BY MOUTH 1 HOUR BEFORE DENTAL WORK. Not Available       Any nurse can fill if patient calls back or replies to [employer]. Right total knee arthroplasty done on 2/11/2021 by Dr. Rodriguez.

## 2025-04-14 ENCOUNTER — APPOINTMENT (OUTPATIENT)
Dept: CT IMAGING | Facility: CLINIC | Age: 75
DRG: 394 | End: 2025-04-14
Attending: EMERGENCY MEDICINE
Payer: COMMERCIAL

## 2025-04-14 ENCOUNTER — HOSPITAL ENCOUNTER (INPATIENT)
Facility: CLINIC | Age: 75
LOS: 2 days | Discharge: HOME | DRG: 394 | End: 2025-04-16
Attending: EMERGENCY MEDICINE | Admitting: SURGERY
Payer: COMMERCIAL

## 2025-04-14 DIAGNOSIS — K35.890 OTHER ACUTE APPENDICITIS: Primary | ICD-10-CM

## 2025-04-14 DIAGNOSIS — K37 APPENDICITIS, UNSPECIFIED APPENDICITIS TYPE: ICD-10-CM

## 2025-04-14 LAB
ALBUMIN SERPL BCG-MCNC: 4.2 G/DL (ref 3.5–5.2)
ALP SERPL-CCNC: 120 U/L (ref 40–150)
ALT SERPL W P-5'-P-CCNC: 25 U/L (ref 0–50)
ANION GAP SERPL CALCULATED.3IONS-SCNC: 11 MMOL/L (ref 7–15)
AST SERPL W P-5'-P-CCNC: 27 U/L (ref 0–45)
BASOPHILS # BLD AUTO: 0 10E3/UL (ref 0–0.2)
BASOPHILS NFR BLD AUTO: 0 %
BILIRUB SERPL-MCNC: 1.2 MG/DL
BUN SERPL-MCNC: 14.3 MG/DL (ref 8–23)
CALCIUM SERPL-MCNC: 9.5 MG/DL (ref 8.8–10.4)
CHLORIDE SERPL-SCNC: 97 MMOL/L (ref 98–107)
CREAT SERPL-MCNC: 0.84 MG/DL (ref 0.51–0.95)
CRP SERPL-MCNC: 60.87 MG/L
EGFRCR SERPLBLD CKD-EPI 2021: 72 ML/MIN/1.73M2
EOSINOPHIL # BLD AUTO: 0 10E3/UL (ref 0–0.7)
EOSINOPHIL NFR BLD AUTO: 0 %
ERYTHROCYTE [DISTWIDTH] IN BLOOD BY AUTOMATED COUNT: 13.2 % (ref 10–15)
GLUCOSE SERPL-MCNC: 119 MG/DL (ref 70–99)
HCO3 SERPL-SCNC: 25 MMOL/L (ref 22–29)
HCT VFR BLD AUTO: 45.4 % (ref 35–47)
HGB BLD-MCNC: 15 G/DL (ref 11.7–15.7)
HOLD SPECIMEN: NORMAL
IMM GRANULOCYTES # BLD: 0.1 10E3/UL
IMM GRANULOCYTES NFR BLD: 0 %
LYMPHOCYTES # BLD AUTO: 0.6 10E3/UL (ref 0.8–5.3)
LYMPHOCYTES NFR BLD AUTO: 4 %
MCH RBC QN AUTO: 29.5 PG (ref 26.5–33)
MCHC RBC AUTO-ENTMCNC: 33 G/DL (ref 31.5–36.5)
MCV RBC AUTO: 89 FL (ref 78–100)
MONOCYTES # BLD AUTO: 0.6 10E3/UL (ref 0–1.3)
MONOCYTES NFR BLD AUTO: 4 %
NEUTROPHILS # BLD AUTO: 13.6 10E3/UL (ref 1.6–8.3)
NEUTROPHILS NFR BLD AUTO: 91 %
NRBC # BLD AUTO: 0 10E3/UL
NRBC BLD AUTO-RTO: 0 /100
PLATELET # BLD AUTO: 289 10E3/UL (ref 150–450)
POTASSIUM SERPL-SCNC: 4.3 MMOL/L (ref 3.4–5.3)
PROCALCITONIN SERPL IA-MCNC: 0.28 NG/ML
PROT SERPL-MCNC: 7 G/DL (ref 6.4–8.3)
RBC # BLD AUTO: 5.09 10E6/UL (ref 3.8–5.2)
SODIUM SERPL-SCNC: 133 MMOL/L (ref 135–145)
WBC # BLD AUTO: 14.9 10E3/UL (ref 4–11)

## 2025-04-14 PROCEDURE — 85004 AUTOMATED DIFF WBC COUNT: CPT | Performed by: EMERGENCY MEDICINE

## 2025-04-14 PROCEDURE — 36415 COLL VENOUS BLD VENIPUNCTURE: CPT | Performed by: EMERGENCY MEDICINE

## 2025-04-14 PROCEDURE — 86140 C-REACTIVE PROTEIN: CPT | Performed by: EMERGENCY MEDICINE

## 2025-04-14 PROCEDURE — 250N000011 HC RX IP 250 OP 636: Performed by: EMERGENCY MEDICINE

## 2025-04-14 PROCEDURE — 82310 ASSAY OF CALCIUM: CPT | Performed by: EMERGENCY MEDICINE

## 2025-04-14 PROCEDURE — 250N000009 HC RX 250: Performed by: EMERGENCY MEDICINE

## 2025-04-14 PROCEDURE — 84145 PROCALCITONIN (PCT): CPT | Performed by: EMERGENCY MEDICINE

## 2025-04-14 PROCEDURE — 250N000011 HC RX IP 250 OP 636: Mod: JZ | Performed by: EMERGENCY MEDICINE

## 2025-04-14 PROCEDURE — 120N000002 HC R&B MED SURG/OB UMMC

## 2025-04-14 PROCEDURE — 74177 CT ABD & PELVIS W/CONTRAST: CPT

## 2025-04-14 RX ORDER — IOPAMIDOL 755 MG/ML
100 INJECTION, SOLUTION INTRAVASCULAR ONCE
Status: COMPLETED | OUTPATIENT
Start: 2025-04-14 | End: 2025-04-14

## 2025-04-14 RX ORDER — SODIUM CHLORIDE, SODIUM LACTATE, POTASSIUM CHLORIDE, CALCIUM CHLORIDE 600; 310; 30; 20 MG/100ML; MG/100ML; MG/100ML; MG/100ML
INJECTION, SOLUTION INTRAVENOUS CONTINUOUS
Status: DISCONTINUED | OUTPATIENT
Start: 2025-04-15 | End: 2025-04-15

## 2025-04-14 RX ORDER — HYDROMORPHONE HCL IN WATER/PF 6 MG/30 ML
0.2 PATIENT CONTROLLED ANALGESIA SYRINGE INTRAVENOUS
Status: DISCONTINUED | OUTPATIENT
Start: 2025-04-14 | End: 2025-04-15

## 2025-04-14 RX ORDER — PIPERACILLIN SODIUM, TAZOBACTAM SODIUM 4; .5 G/20ML; G/20ML
4.5 INJECTION, POWDER, LYOPHILIZED, FOR SOLUTION INTRAVENOUS ONCE
Status: COMPLETED | OUTPATIENT
Start: 2025-04-15 | End: 2025-04-15

## 2025-04-14 RX ADMIN — IOPAMIDOL 78 ML: 755 INJECTION, SOLUTION INTRAVENOUS at 22:05

## 2025-04-14 RX ADMIN — HYDROMORPHONE HYDROCHLORIDE 0.2 MG: 0.2 INJECTION, SOLUTION INTRAMUSCULAR; INTRAVENOUS; SUBCUTANEOUS at 22:48

## 2025-04-14 RX ADMIN — SODIUM CHLORIDE 38 ML: 9 INJECTION, SOLUTION INTRAVENOUS at 22:06

## 2025-04-14 ASSESSMENT — ACTIVITIES OF DAILY LIVING (ADL)
ADLS_ACUITY_SCORE: 58

## 2025-04-14 ASSESSMENT — COLUMBIA-SUICIDE SEVERITY RATING SCALE - C-SSRS
2. HAVE YOU ACTUALLY HAD ANY THOUGHTS OF KILLING YOURSELF IN THE PAST MONTH?: NO
6. HAVE YOU EVER DONE ANYTHING, STARTED TO DO ANYTHING, OR PREPARED TO DO ANYTHING TO END YOUR LIFE?: NO
1. IN THE PAST MONTH, HAVE YOU WISHED YOU WERE DEAD OR WISHED YOU COULD GO TO SLEEP AND NOT WAKE UP?: NO

## 2025-04-15 PROBLEM — K37 APPENDICITIS, UNSPECIFIED APPENDICITIS TYPE: Status: ACTIVE | Noted: 2025-04-15

## 2025-04-15 LAB
ALBUMIN UR-MCNC: NEGATIVE MG/DL
ANION GAP SERPL CALCULATED.3IONS-SCNC: 11 MMOL/L (ref 7–15)
APPEARANCE UR: CLEAR
BILIRUB UR QL STRIP: NEGATIVE
BUN SERPL-MCNC: 12.5 MG/DL (ref 8–23)
CALCIUM SERPL-MCNC: 9.4 MG/DL (ref 8.8–10.4)
CHLORIDE SERPL-SCNC: 100 MMOL/L (ref 98–107)
COLOR UR AUTO: ABNORMAL
CREAT SERPL-MCNC: 0.88 MG/DL (ref 0.51–0.95)
EGFRCR SERPLBLD CKD-EPI 2021: 68 ML/MIN/1.73M2
ERYTHROCYTE [DISTWIDTH] IN BLOOD BY AUTOMATED COUNT: 13.6 % (ref 10–15)
GLUCOSE SERPL-MCNC: 109 MG/DL (ref 70–99)
GLUCOSE UR STRIP-MCNC: NEGATIVE MG/DL
HCO3 SERPL-SCNC: 23 MMOL/L (ref 22–29)
HCT VFR BLD AUTO: 39 % (ref 35–47)
HGB BLD-MCNC: 12.9 G/DL (ref 11.7–15.7)
HGB UR QL STRIP: NEGATIVE
KETONES UR STRIP-MCNC: NEGATIVE MG/DL
LEUKOCYTE ESTERASE UR QL STRIP: ABNORMAL
MCH RBC QN AUTO: 29.5 PG (ref 26.5–33)
MCHC RBC AUTO-ENTMCNC: 33.1 G/DL (ref 31.5–36.5)
MCV RBC AUTO: 89 FL (ref 78–100)
NITRATE UR QL: NEGATIVE
PH UR STRIP: 7.5 [PH] (ref 5–7)
PLATELET # BLD AUTO: 243 10E3/UL (ref 150–450)
POTASSIUM SERPL-SCNC: 4 MMOL/L (ref 3.4–5.3)
RBC # BLD AUTO: 4.38 10E6/UL (ref 3.8–5.2)
RBC URINE: 1 /HPF
SODIUM SERPL-SCNC: 134 MMOL/L (ref 135–145)
SP GR UR STRIP: 1.01 (ref 1–1.03)
UROBILINOGEN UR STRIP-MCNC: NORMAL MG/DL
WBC # BLD AUTO: 16.5 10E3/UL (ref 4–11)
WBC URINE: 1 /HPF

## 2025-04-15 PROCEDURE — 99285 EMERGENCY DEPT VISIT HI MDM: CPT | Mod: 25 | Performed by: EMERGENCY MEDICINE

## 2025-04-15 PROCEDURE — 258N000003 HC RX IP 258 OP 636: Performed by: EMERGENCY MEDICINE

## 2025-04-15 PROCEDURE — 120N000002 HC R&B MED SURG/OB UMMC

## 2025-04-15 PROCEDURE — 99282 EMERGENCY DEPT VISIT SF MDM: CPT | Performed by: SURGERY

## 2025-04-15 PROCEDURE — 99285 EMERGENCY DEPT VISIT HI MDM: CPT | Performed by: EMERGENCY MEDICINE

## 2025-04-15 PROCEDURE — 81003 URINALYSIS AUTO W/O SCOPE: CPT | Performed by: EMERGENCY MEDICINE

## 2025-04-15 PROCEDURE — 250N000013 HC RX MED GY IP 250 OP 250 PS 637

## 2025-04-15 PROCEDURE — 80048 BASIC METABOLIC PNL TOTAL CA: CPT

## 2025-04-15 PROCEDURE — 250N000011 HC RX IP 250 OP 636: Performed by: SURGERY

## 2025-04-15 PROCEDURE — 99222 1ST HOSP IP/OBS MODERATE 55: CPT | Performed by: INTERNAL MEDICINE

## 2025-04-15 PROCEDURE — 258N000003 HC RX IP 258 OP 636

## 2025-04-15 PROCEDURE — 85014 HEMATOCRIT: CPT

## 2025-04-15 PROCEDURE — 36415 COLL VENOUS BLD VENIPUNCTURE: CPT

## 2025-04-15 PROCEDURE — 250N000011 HC RX IP 250 OP 636

## 2025-04-15 PROCEDURE — 250N000011 HC RX IP 250 OP 636: Performed by: EMERGENCY MEDICINE

## 2025-04-15 PROCEDURE — 96374 THER/PROPH/DIAG INJ IV PUSH: CPT | Mod: 59 | Performed by: EMERGENCY MEDICINE

## 2025-04-15 RX ORDER — PIPERACILLIN SODIUM, TAZOBACTAM SODIUM 3; .375 G/15ML; G/15ML
3.38 INJECTION, POWDER, LYOPHILIZED, FOR SOLUTION INTRAVENOUS EVERY 6 HOURS
Status: DISCONTINUED | OUTPATIENT
Start: 2025-04-15 | End: 2025-04-16 | Stop reason: HOSPADM

## 2025-04-15 RX ORDER — HYDROXYZINE HYDROCHLORIDE 25 MG/1
25 TABLET, FILM COATED ORAL EVERY 6 HOURS PRN
Status: DISCONTINUED | OUTPATIENT
Start: 2025-04-15 | End: 2025-04-16 | Stop reason: HOSPADM

## 2025-04-15 RX ORDER — HYDROMORPHONE HCL IN WATER/PF 6 MG/30 ML
0.2 PATIENT CONTROLLED ANALGESIA SYRINGE INTRAVENOUS EVERY 4 HOURS PRN
Status: DISCONTINUED | OUTPATIENT
Start: 2025-04-15 | End: 2025-04-15

## 2025-04-15 RX ORDER — GABAPENTIN 300 MG/1
300 CAPSULE ORAL 3 TIMES DAILY
Status: DISCONTINUED | OUTPATIENT
Start: 2025-04-15 | End: 2025-04-16

## 2025-04-15 RX ORDER — PIPERACILLIN SODIUM, TAZOBACTAM SODIUM 4; .5 G/20ML; G/20ML
4.5 INJECTION, POWDER, LYOPHILIZED, FOR SOLUTION INTRAVENOUS EVERY 6 HOURS
Status: DISCONTINUED | OUTPATIENT
Start: 2025-04-15 | End: 2025-04-15

## 2025-04-15 RX ORDER — HYDROXYZINE HYDROCHLORIDE 50 MG/1
50 TABLET, FILM COATED ORAL EVERY 6 HOURS PRN
Status: DISCONTINUED | OUTPATIENT
Start: 2025-04-15 | End: 2025-04-16 | Stop reason: HOSPADM

## 2025-04-15 RX ORDER — ACETAMINOPHEN 325 MG/1
650 TABLET ORAL EVERY 4 HOURS PRN
Status: DISCONTINUED | OUTPATIENT
Start: 2025-04-15 | End: 2025-04-16 | Stop reason: HOSPADM

## 2025-04-15 RX ORDER — OXYCODONE HYDROCHLORIDE 5 MG/1
5-10 TABLET ORAL EVERY 4 HOURS PRN
Status: DISCONTINUED | OUTPATIENT
Start: 2025-04-15 | End: 2025-04-16 | Stop reason: HOSPADM

## 2025-04-15 RX ORDER — LIDOCAINE 40 MG/G
CREAM TOPICAL
Status: DISCONTINUED | OUTPATIENT
Start: 2025-04-15 | End: 2025-04-16 | Stop reason: HOSPADM

## 2025-04-15 RX ORDER — SODIUM CHLORIDE, SODIUM LACTATE, POTASSIUM CHLORIDE, CALCIUM CHLORIDE 600; 310; 30; 20 MG/100ML; MG/100ML; MG/100ML; MG/100ML
INJECTION, SOLUTION INTRAVENOUS CONTINUOUS
Status: DISCONTINUED | OUTPATIENT
Start: 2025-04-15 | End: 2025-04-16

## 2025-04-15 RX ADMIN — OXYCODONE HYDROCHLORIDE 5 MG: 5 TABLET ORAL at 21:45

## 2025-04-15 RX ADMIN — PIPERACILLIN AND TAZOBACTAM 4.5 G: 4; .5 INJECTION, POWDER, LYOPHILIZED, FOR SOLUTION INTRAVENOUS at 00:21

## 2025-04-15 RX ADMIN — HYDROMORPHONE HYDROCHLORIDE 0.2 MG: 0.2 INJECTION, SOLUTION INTRAMUSCULAR; INTRAVENOUS; SUBCUTANEOUS at 02:10

## 2025-04-15 RX ADMIN — HYDROMORPHONE HYDROCHLORIDE 0.2 MG: 0.2 INJECTION, SOLUTION INTRAMUSCULAR; INTRAVENOUS; SUBCUTANEOUS at 05:00

## 2025-04-15 RX ADMIN — ACETAMINOPHEN 650 MG: 325 TABLET, FILM COATED ORAL at 15:37

## 2025-04-15 RX ADMIN — SODIUM CHLORIDE, SODIUM LACTATE, POTASSIUM CHLORIDE, AND CALCIUM CHLORIDE: .6; .31; .03; .02 INJECTION, SOLUTION INTRAVENOUS at 17:24

## 2025-04-15 RX ADMIN — PIPERACILLIN AND TAZOBACTAM 4.5 G: 4; .5 INJECTION, POWDER, LYOPHILIZED, FOR SOLUTION INTRAVENOUS at 05:39

## 2025-04-15 RX ADMIN — SODIUM CHLORIDE, SODIUM LACTATE, POTASSIUM CHLORIDE, AND CALCIUM CHLORIDE: .6; .31; .03; .02 INJECTION, SOLUTION INTRAVENOUS at 00:28

## 2025-04-15 RX ADMIN — SODIUM CHLORIDE, SODIUM LACTATE, POTASSIUM CHLORIDE, AND CALCIUM CHLORIDE: .6; .31; .03; .02 INJECTION, SOLUTION INTRAVENOUS at 04:25

## 2025-04-15 RX ADMIN — PIPERACILLIN AND TAZOBACTAM 4.5 G: 4; .5 INJECTION, POWDER, LYOPHILIZED, FOR SOLUTION INTRAVENOUS at 11:15

## 2025-04-15 RX ADMIN — PIPERACILLIN AND TAZOBACTAM 3.38 G: 3; .375 INJECTION, POWDER, LYOPHILIZED, FOR SOLUTION INTRAVENOUS at 17:23

## 2025-04-15 RX ADMIN — PIPERACILLIN AND TAZOBACTAM 3.38 G: 3; .375 INJECTION, POWDER, LYOPHILIZED, FOR SOLUTION INTRAVENOUS at 23:49

## 2025-04-15 ASSESSMENT — ACTIVITIES OF DAILY LIVING (ADL)
ADLS_ACUITY_SCORE: 55
ADLS_ACUITY_SCORE: 54
ADLS_ACUITY_SCORE: 58
ADLS_ACUITY_SCORE: 58
ADLS_ACUITY_SCORE: 55
ADLS_ACUITY_SCORE: 58
ADLS_ACUITY_SCORE: 55
ADLS_ACUITY_SCORE: 58
ADLS_ACUITY_SCORE: 55
ADLS_ACUITY_SCORE: 58
ADLS_ACUITY_SCORE: 55
ADLS_ACUITY_SCORE: 54

## 2025-04-15 NOTE — PROGRESS NOTES
Brief progress note    Briefly patient is a 74-year-old female with history of platelet function defect, who presented to ED with complaint of right lower quadrant abdominal pain.      In the ED, patient underwent a CT scan abdomen pelvis, which showed suggestion of acute appendicitis.  It also showed some suggestion of developing ileus or obstruction of small bowel.     Per ED team, patient was discussed with general surgery team and they are going to most likely operate her tomorrow morning.  Hematology team was consulted regarding guidance on perioperative platelet management.      Diagnosis:  #Platelet function defect.   #Acute Appendicitis  # Small bowel ileus versus obstruction     Plan:  The hematological treatment plan for possible appendectomy procedure:    1. Transfuse 1 unit of platelets immediately pre-operatively  2. Give benadryl 50 mg IV and acetominophen 650 mg as premeds prior to platelet transfusion  3. If excessive bleeding during, or within 24 hours of procedure, give another 1 unit of platelets with premeds.  4.  We will follow pt formally tomorrow morning.      Patient was discussed with Dr. Mauri Mora MD  Hematology and Medical Oncology Fellow, PGY-4  [facility]  Pager: [phone]

## 2025-04-15 NOTE — ED TRIAGE NOTES
Pt. states having Right  lower quadrant pain this a.m.  Worsening throughout day. States unable to pass flatus today, but fells like she needs to.  Slightly nausea. No fever   Triage Assessment (Adult)       Row Name 04/14/25 1935          Triage Assessment    Airway WDL WDL        Respiratory WDL    Respiratory WDL WDL        Skin Circulation/Temperature WDL    Skin Circulation/Temperature WDL WDL        Cardiac WDL    Cardiac WDL WDL        Peripheral/Neurovascular WDL    Peripheral Neurovascular WDL WDL        Cognitive/Neuro/Behavioral WDL    Cognitive/Neuro/Behavioral WDL WDL

## 2025-04-15 NOTE — ED PROVIDER NOTES
ED Provider Note  [facility]      History     Chief Complaint   Patient presents with    Abdominal Pain     right sided abd pain     HPI  Maria Luisa Garcia is a 74 year old female with a history of platelet function defect who presents to the emergency department for abdominal pain. The patient states that she woke up this morning and her abdomin abdomin felt off. While she was driving home from work around 4pm she had an increase in RLQ abdominal pain. She notes that the bumps in the road did make her pain worse. She has not had a bowel movement today but did have a big bowel movement yesterday. She does note that she has not been able to stand up straight which she thinks has caused her to have some back pain. She denies any fevers, chills, diarrhea, discomfort with urination, change in appetite or daily medications.    Past Medical History  Past Medical History:   Diagnosis Date    Platelet function defect (H)      Past Surgical History:   Procedure Laterality Date    ARTHROPLASTY KNEE Left 1/8/2021    Procedure: Left total knee arthroplasty;  Surgeon: Walter Rodriguez MD;  Location: [facility] OR    ARTHROPLASTY KNEE Right 2/11/2021    Procedure: Right Total Knee Arthroplasty;  Surgeon: Walter Rodriguez MD;  Location: [facility] OR    ARTHROSCOPIC RECONSTRUCTION ANTERIOR CRUCIATE LIGAMENT      left    ARTHROSCOPY KNEE      right    BACK SURGERY      lumbar    CARPAL TUNNEL RELEASE RT/LT      bilateral    COLONOSCOPY      REPAIR HAMMER TOE BILATERAL Bilateral 12/10/2014    Procedure: REPAIR HAMMER TOE BILATERAL;  Surgeon: Catrachito Estrella MD;  Location: [facility] OR    WRIST SURGERY      right, fracture repair     calcium carbonate (OS-MARI) 600 MG tablet  cholecalciferol (VITAMIN D3) 125 mcg (5000 units) capsule  Coenzyme Q10 300 MG CAPS  Glucosamine-Chondroit-Vit C-Mn (GLUCOSAMINE CHONDR 1500 COMPLX PO)  magnesium oxide (MAG-OX) 400 (240 Mg) MG tablet  acetaminophen (TYLENOL) 325 MG  tablet  amoxicillin (AMOXIL) 500 MG tablet  celecoxib (CELEBREX) 100 MG capsule  cephALEXin (KEFLEX) 500 MG capsule  gabapentin (NEURONTIN) 300 MG capsule  hydrOXYzine (ATARAX) 25 MG tablet  hydrOXYzine (VISTARIL) 25 MG capsule  hydrOXYzine (VISTARIL) 25 MG capsule  polyethylene glycol (MIRALAX) 17 g packet  senna-docusate (SENOKOT-S/PERICOLACE) 8.6-50 MG tablet      Allergies   Allergen Reactions    Clindamycin Phosphate Anaphylaxis    Codeine Nausea and Vomiting     Pt can tolerate one tablet. Has N/V when taking 2 tablets     Family History  Family History   Problem Relation Age of Onset    Hypertension Mother     Melanoma Mother 84        malignant melanoma of ethmoid sinus    Diabetes Father     Hypertension Father     Parkinsonism Father     Hypertension Sister     No Known Problems Brother     Deep Vein Thrombosis No family hx of     Anesthesia Reaction No family hx of      Social History   Social History     Tobacco Use    Smoking status: Never    Smokeless tobacco: Never   Substance Use Topics    Alcohol use: Yes     Comment: socially    Drug use: No      Past medical history, past surgical history, medications, allergies, family history, and social history were reviewed with the patient. No additional pertinent items.   A complete review of systems was performed with pertinent positives and negatives noted in the HPI, and all other systems negative.    Physical Exam      Physical Exam  Vitals and nursing note reviewed.   Constitutional:       General: She is not in acute distress.     Appearance: Normal appearance. She is well-developed.   HENT:      Head: Normocephalic and atraumatic.   Eyes:      General: No scleral icterus.     Conjunctiva/sclera: Conjunctivae normal.   Cardiovascular:      Rate and Rhythm: Normal rate.   Pulmonary:      Effort: Pulmonary effort is normal. No respiratory distress.   Abdominal:      General: Abdomen is flat.      Tenderness: There is abdominal tenderness in the right  lower quadrant.   Musculoskeletal:      Cervical back: Normal range of motion and neck supple.   Skin:     General: Skin is warm and dry.      Findings: No rash.   Neurological:      Mental Status: She is alert and oriented to person, place, and time.             ED Course, Procedures, & Data      Procedures          No results found for any visits on 04/14/25.  Medications - No data to display  Labs Ordered and Resulted from Time of ED Arrival to Time of ED Departure - No data to display  No orders to display          Critical care was not performed.     Medical Decision Making  The patient's presentation was of high complexity (an acute health issue posing potential threat to life or bodily function).    The patient's evaluation involved:  ordering and/or review of 3+ test(s) in this encounter (see separate area of note for details)  discussion of management or test interpretation with another health professional (Dr. Chapa with general surgery)    The patient's management necessitated high risk (a parenteral controlled substance) and high risk (a decision regarding hospitalization).    Assessment & Plan      74 year old female with a history of platelet function defect who presents to the emergency department for abdominal pain.  Vital signs stable and afebrile covid normal pulse ox at 99% on room air.  IV established, labs sent which revealed a leukocytosis with a white count of 14.9 otherwise normal CBC, normal electrolytes, CRP elevation to 60, Pro-Rashawn 0.28.  Patient sent to CT for imaging abdomen pelvis for which revealed signs of appendicitis.  Patient given Zosyn and Dilaudid for pain control along with IV fluids.    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  Hematology consulted who will review the chart and  provide guidance preoperatively.  Case discussed with Dr. Chapa with agreement to admit to inpatient status.  Patient to be transferred to [place] ED and general surgery resident to be notified upon arrival.    I have reviewed the nursing notes. I have reviewed the findings, diagnosis, plan and need for follow up with the patient.    New Prescriptions    No medications on file       Final diagnoses:   Appendicitis, unspecified appendicitis type   I, Jade Trent, am serving as a trained medical scribe to document services personally performed by Sheron Monique MD, based on the provider's statements to me.     I, Sheron Monique MD, was physically present and have reviewed and verified the accuracy of this note documented by Jade Trent.     Sheron Monique MD  [facility] EMERGENCY DEPARTMENT  4/14/2025     Sheron Monique MD  04/15/25 0054

## 2025-04-15 NOTE — PLAN OF CARE
Goal Outcome Evaluation:    Plan of Care Reviewed With: patient    Overall Patient Progress: no change       Outcome Evaluation:       Presented to the ED with worsening RLQ Abd pain  VS: stable on RA, afebrile  Neuro: A&Ox4  Pain/Nausea: Abd pain 7/10  PRN: Dilaudid x1  Diet: NPO for procedure  IV Access: RPIV  Infusion(s): LR 100ml/hr  GI/: Voids without difficulty, LBM 4/13  Skin: No issues  Mobility: Up ad kayla  Plan: Appendectomy this AM no time given, continue POC

## 2025-04-15 NOTE — CONSULTS
"Hematology consult note    Reasons for Consult: -Platelet function defect, possibly needs surgery    History of Present Illness: Ms. Garcia is a 74-year-old woman with a longstanding history of mild platelet function defect.  She does not have problem with bleeding or bruising with everyday activities, but has a distant history of bleeding with surgical procedures (see my note from [facility], 12/7/2020).  Evaluation from 1996 showed that she has a platelet function defect, suggestive of \"aspirin like effect.\".  She has done well with simple platelet transfusion given immediately prior to procedures.  For example, she underwent total knee replacement on 2/11/2021.  She received 1 unit platelet immediately prior to procedure, and then 1 additional unit later that day.  She then received 1 unit on 2/12/2021 and 2/13/2021    Yesterday she presented to ED with abdominal pain, that it gradually worsened throughout the day.  There was concern for acute appendicitis.  She was started on Zosyn.  She was seen by the general surgery team, and it was decided to manage nonoperatively.  There is a possibility that she may need surgery in the future once inflammation has calm down.  She does report that her abdomen feels much better today.  She is not having any bleeding symptoms.      Past Medical History:  - Platelet function defect  -Status post left and right knee arthroplasties 2021  - Status post arthroscopic knee surgery, and years ago  - Status post lumbar spine surgery  - Status post carpal tunnel release bilateral  - Status post hammertoe repair bilateral    Medications:  - Reviewed    Family History: mother-hypertension, melanoma, father-diabetes, hypertension, Parkinson's.  Siblings-1 sister with hypertension    Social History: smoking-never, alcohol-occasional    Review of systems:  As in HPI.  The rest of the > 10 point review of systems was negative.      PHYSICAL EXAMINATION:  /60 " "(BP Location: Right arm)   Pulse 89   Temp 99  F (37.2  C) (Oral)   Resp 16   Ht 1.6 m (5' 3\")   Wt 72.2 kg (159 lb 3.2 oz)   SpO2 95%   BMI 28.20 kg/m      General appearance:  Patient is  74 year old woman in no acute distress.     HEENT:  No pallor, icterus,   Lungs:  breathing comfortably  Abdomen:  Positive bowel sounds, nondistended mild right lower quadrant tenderness      Extremities:  No ankle edema.     Skin:  No rash, no petechiae or ecchymoses.       Labs:  CMP  Recent Labs   Lab 04/15/25  0651 04/14/25 1957   * 133*   POTASSIUM 4.0 4.3   CHLORIDE 100 97*   CO2 23 25   ANIONGAP 11 11   * 119*   BUN 12.5 14.3   CR 0.88 0.84   GFRESTIMATED 68 72   MARI 9.4 9.5   PROTTOTAL  --  7.0   ALBUMIN  --  4.2   BILITOTAL  --  1.2   ALKPHOS  --  120   AST  --  27   ALT  --  25     CBC  Recent Labs   Lab 04/15/25  0650 04/14/25 1957   WBC 16.5* 14.9*   RBC 4.38 5.09   HGB 12.9 15.0   HCT 39.0 45.4   MCV 89 89   MCH 29.5 29.5   MCHC 33.1 33.0   RDW 13.6 13.2    289       Imaging:  EXAM: CT ABDOMEN PELVIS W CONTRAST  LOCATION: [facility]  DATE: 4/14/2025     INDICATION: RLQ pain  COMPARISON: None.  TECHNIQUE: CT scan of the abdomen and pelvis was performed following injection of IV contrast. Multiplanar reformats were obtained. Dose reduction techniques were used.  CONTRAST: 78 mL Isovue 370     FINDINGS:   LOWER CHEST: Calcified granuloma right middle lobe.     HEPATOBILIARY: Mild diffuse fatty infiltration of the liver. Calcified hepatic granuloma. Gallstones.     PANCREAS: Small focus of fat at the junction of the pancreatic head and body. No inflammatory changes.     SPLEEN: Normal.     ADRENAL GLANDS: Normal.     KIDNEYS/BLADDER: 1 mm nonobstructing stone right kidney and tiny right renal cyst. Normal left kidney.     BOWEL: Dilated appendix in the right lower quadrant measuring 11 mm, with 8 mm appendicolith at the appendiceal base. Two " additional appendicoliths towards the tip of the appendix. Edematous in the adjacent mesentery and marked soft tissue thickening at   the base of the cecum and ileocecal valve. No evidence for perforation or abscess. Several mildly dilated loops of fluid-filled small bowel mid and distal ileum.     LYMPH NODES: Normal.     VASCULATURE: Normal.     PELVIC ORGANS: Small amount of free pelvic fluid.     MUSCULOSKELETAL: Degenerative disc and facet disease lumbar spine.                                                                   IMPRESSION:   1.  Acute appendicitis.  2.  Marked soft tissue thickening at the base of the cecum and ileocecal valve, with dilatation of the distal small bowel which could represent a developing ileus or obstruction. Surgical consultation recommended.    Assessment and Recommendation: -    # Mild platelet function defect-she has appendicitis, and may need surgery in the future, possibly hemicolectomy.  She does not have any bleeding issues with her usual activities.  So no intervention needs to be taken today, but only in the setting of surgery.  Previously, bleeding issues have been well-controlled by simple platelet transfusion immediately prior to procedure.  Given the nature of the surgery, I would also be inclined to give a dose of tranexamic acid preoperatively.  She would then need to be followed and if there is a significant drop in hemoglobin or overt bleeding, she can be given additional platelets and also placed on tranexamic acid 1 g IV every 8 hours.    Recommendations for surgical procedure:    -Transfuse 1 unit platelet immediately prior to surgery  -Tranexamic acid 1 gram IV immediately prior to surgery  - If evidence of significant bleeding post operatively, give additional 1 unit of platelets and start tranexamic acid 1 g IV every 8 hours    Tram Dotson MD  Hematology

## 2025-04-15 NOTE — H&P
[facility]    History and Physical - Emergency General Surgery Service       Date of Admission:  4/14/2025    Assessment & Plan: Surgery   Maria Luisa Garcia is a 74 year old female with a history of osteoarthritis, platelet function defect, and respiratory arrest secondary to epidural anesthesia while pregnant who presented to the ED on [place] with abdominal pain. ED work up was significant for hyponatremia, CRP 60.89, WBC 14.9, and CT scan with evidence of acute appendicitis with soft tissue thickening to the to cecum with dilation of the distal small bowel. Patient was discussed with Dr. Chapa who recommended transfer to [place] for further evaluation by the surgery team. Prior to her transfer she was given Zosyn and hematology was consulted for management of her history of platelet dysfunction given the potential for operative intervention for her acute appendicitis. Discussed the natural history of acute appendicitis with the patient.     - Admit to EGS  - NPO, IVF  - Continue IV antibiotics  - Appreciate hematology recs: transfused 1 unit of platelets immediately pre-operatively, give benadryl 50 mg IV and Tylenol 650 mg as pre-meds prior to platelet transfusion, if excessive bleeding during or within 24 hours of procedure given an additional 1 unit of platelets with pre-meds    The patient's care was discussed with the chief resident and attending physician.    Brie Walter DO  [facility]  All communications related to this note should be expressed to resident/GAMALIEL on call for this team at the time of your communication. Please be advised that not all members of this team utilize vocera.  ______________________________________________________________________    Chief Complaint   Abdominal pain    History of Present Illness   Maria Luisa Garcia is a 74 year old female with a history of osteoarthritis, platelet function  defect, and respiratory arrest secondary to epidural anesthesia while pregnant who presented to the ED on [place] with abdominal pain. ED work up was significant for hyponatremia, CRP 60.89, WBC 14.9, and CT scan with evidence of acute appendicitis with soft tissue thickening to the to cecum with dilation of the distal small bowel. Patient was discussed with Dr. Chapa who recommended transfer to [place] for further evaluation by the surgery team. Prior to her transfer she was given Zosyn and hematology was consulted for management of her history of platelet dysfunction given the potential for operative intervention for her acute appendicitis.     Patient seen at bedside upon transfer to the [place] ED. Patient states that her abdominal pain started yesterday yesterday morning, it worsened throughout the day and noticed it more prominently around 4 pm when she was leaving work. When she arrived home she thought the pain may be secondary to her not eating or drinking very much throughout the day. She then noticed abdominal pain with palpation and she called her son over to bring her to the ED. She most recently had a bowel movement yesterday. Has had some mild nausea with the narcotic pain medications but denies any emesis. Reports that she last ate Sunday. Denies being on any blood thinners. For abdominal surgical history she has had two c-sections. 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       Past Medical History    Past Medical History:   Diagnosis Date    Platelet function defect (H)        Past Surgical History   Past Surgical History:   Procedure Laterality Date    ARTHROPLASTY KNEE Left 1/8/2021    Procedure: Left total knee arthroplasty;  Surgeon: Walter Rodriguez MD;  Location: [facility] OR    ARTHROPLASTY KNEE Right 2/11/2021    Procedure: Right Total Knee Arthroplasty;  Surgeon: Walter Rodriguez MD;  Location: [facility] OR    ARTHROSCOPIC RECONSTRUCTION ANTERIOR  CRUCIATE LIGAMENT      left    ARTHROSCOPY KNEE      right    BACK SURGERY      lumbar    CARPAL TUNNEL RELEASE RT/LT      bilateral    COLONOSCOPY      REPAIR HAMMER TOE BILATERAL Bilateral 12/10/2014    Procedure: REPAIR HAMMER TOE BILATERAL;  Surgeon: Catrachito Estrella MD;  Location: [facility] OR    WRIST SURGERY      right, fracture repair       Prior to Admission Medications   Prior to Admission Medications   Prescriptions Last Dose Informant Patient Reported? Taking?   Coenzyme Q10 300 MG CAPS 4/14/2025  Yes Yes   Sig: Take 300 mg % by mouth every morning   Glucosamine-Chondroit-Vit C-Mn (GLUCOSAMINE CHONDR 1500 COMPLX PO) 4/14/2025 Self Yes Yes   Sig: Take 2 tablets by mouth every morning Brand name: Bi Flex   acetaminophen (TYLENOL) 325 MG tablet   No No   Sig: Take 2 tablets (650 mg) by mouth continuous prn for other (Give dose prior to any platelet infusion)   amoxicillin (AMOXIL) 500 MG tablet   No No   Sig: Take 4 pills 1 hour prior to dental procedure.   calcium carbonate (OS-MARI) 600 MG tablet 4/14/2025  Yes Yes   Sig: Take 600 mg by mouth 2 times daily (with meals)   celecoxib (CELEBREX) 100 MG capsule   No No   Sig: Take 2 capsules (200 mg) by mouth 2 times daily   cephALEXin (KEFLEX) 500 MG capsule   No No   Sig: Take 1 capsule (500 mg) by mouth 2 times daily   cholecalciferol (VITAMIN D3) 125 mcg (5000 units) capsule 4/14/2025  Yes Yes   Sig: Take 125 mcg by mouth 2 times daily Winter time dose   gabapentin (NEURONTIN) 300 MG capsule   No No   Sig: Take 1 capsule (300 mg) by mouth 3 times daily   hydrOXYzine (ATARAX) 25 MG tablet   No No   Sig: Take 1 tablet (25 mg) by mouth every 6 hours as needed for other (adjuvant pain)   hydrOXYzine (VISTARIL) 25 MG capsule   No No   Sig: Take 1 capsule (25 mg) by mouth 3 times daily as needed for itching For severe pain   hydrOXYzine (VISTARIL) 25 MG capsule   No No   Sig: Take 1 capsule (25 mg) by mouth every 6 hours For severe pain   magnesium oxide (MAG-OX)  400 (240 Mg) MG tablet 4/14/2025  Yes Yes   Sig: Take 400 mg by mouth daily   polyethylene glycol (MIRALAX) 17 g packet   No No   Sig: Take 17 g by mouth daily   senna-docusate (SENOKOT-S/PERICOLACE) 8.6-50 MG tablet   No No   Sig: Take 1 tablet by mouth 2 times daily      Facility-Administered Medications: None        Review of Systems    The 10 point Review of Systems is negative other than noted in the HPI or here.     Social History   Social History     Tobacco Use    Smoking status: Never    Smokeless tobacco: Never   Substance Use Topics    Alcohol use: Yes     Comment: socially    Drug use: No     Family History   I have reviewed this patient's family history and updated it with pertinent information if needed.  Family History   Problem Relation Age of Onset    Hypertension Mother     Melanoma Mother 84        malignant melanoma of ethmoid sinus    Diabetes Father     Hypertension Father     Parkinsonism Father     Hypertension Sister     No Known Problems Brother     Deep Vein Thrombosis No family hx of     Anesthesia Reaction No family hx of      Allergies   Allergies   Allergen Reactions    Clindamycin Phosphate Anaphylaxis    Codeine Nausea and Vomiting     Pt can tolerate one tablet. Has N/V when taking 2 tablets        Physical Exam   Vital Signs: Temp: 99.7  F (37.6  C) Temp src: Oral BP: (!) 141/65 Pulse: 98   Resp: 16 SpO2: 95 % O2 Device: None (Room air)    Weight: 159 lbs 3.2 ozNo intake or output data in the 24 hours ending 04/15/25 0240     Gen: Alert and conversational adult female in NAD  Eyes: Nonicteric  ENT: Mucous Membranes Moist  Pulm: Nonlabored Breathing on RA, CTAB  CV: S1, S2, no murmurs  Abd: Soft, non-distended, tenderness to palpation RLQ and right abdomen, no rebound, not peritonitic   : No davis present  Skin: WWP  Extremities: No peripheral edema, palpable bilateral DP and PT pulses   Neuro: awake and alert  Psych: Appropriate in conversation         Data     I have personally  reviewed the following data over the past 24 hrs:    14.9 (H)  \   15.0   / 289     133 (L) 97 (L) 14.3 /  119 (H)   4.3 25 0.84 \     ALT: 25 AST: 27 AP: 120 TBILI: 1.2   ALB: 4.2 TOT PROTEIN: 7.0 LIPASE: N/A     Procal: 0.28 CRP: 60.87 (H) Lactic Acid: N/A         Imaging results reviewed over the past 24 hrs:   Recent Results (from the past 24 hours)   CT Abdomen Pelvis w Contrast    Narrative    EXAM: CT ABDOMEN PELVIS W CONTRAST  LOCATION: [facility]  DATE: 4/14/2025    INDICATION: RLQ pain  COMPARISON: None.  TECHNIQUE: CT scan of the abdomen and pelvis was performed following injection of IV contrast. Multiplanar reformats were obtained. Dose reduction techniques were used.  CONTRAST: 78 mL Isovue 370    FINDINGS:   LOWER CHEST: Calcified granuloma right middle lobe.    HEPATOBILIARY: Mild diffuse fatty infiltration of the liver. Calcified hepatic granuloma. Gallstones.    PANCREAS: Small focus of fat at the junction of the pancreatic head and body. No inflammatory changes.    SPLEEN: Normal.    ADRENAL GLANDS: Normal.    KIDNEYS/BLADDER: 1 mm nonobstructing stone right kidney and tiny right renal cyst. Normal left kidney.    BOWEL: Dilated appendix in the right lower quadrant measuring 11 mm, with 8 mm appendicolith at the appendiceal base. Two additional appendicoliths towards the tip of the appendix. Edematous in the adjacent mesentery and marked soft tissue thickening at   the base of the cecum and ileocecal valve. No evidence for perforation or abscess. Several mildly dilated loops of fluid-filled small bowel mid and distal ileum.    LYMPH NODES: Normal.    VASCULATURE: Normal.    PELVIC ORGANS: Small amount of free pelvic fluid.    MUSCULOSKELETAL: Degenerative disc and facet disease lumbar spine.      Impression    IMPRESSION:   1.  Acute appendicitis.  2.  Marked soft tissue thickening at the base of the cecum and ileocecal valve, with dilatation of the  distal small bowel which could represent a developing ileus or obstruction. Surgical consultation recommended.      [Follow-Up - Clinic] : a clinic follow-up of

## 2025-04-15 NOTE — ED NOTES
Bed: ED24  Expected date:   Expected time:   Means of arrival:   Comments:  [facility] transfer, meera Clements

## 2025-04-15 NOTE — PROGRESS NOTES
Surgery Progress Note  04/15/2025       Subjective:  - No acute events overnight. Continues to have abdominal pain. No N/V. Last BM Sunday 4/13.     Objective:  Temp:  [97.9  F (36.6  C)-99.7  F (37.6  C)] 99  F (37.2  C)  Pulse:  [89-98] 89  Resp:  [16] 16  BP: (121-141)/(55-78) 121/60  SpO2:  [95 %-99 %] 95 %    No intake/output data recorded.      Gen: Awake, alert, NAD  Resp: NLB on RA  Abd: soft, nondistended, tender to palpation in RLQ. No rebound tenderness.   Ext: WWP, no edema     Labs:  Recent Labs   Lab 04/15/25  0650 04/14/25 1957   WBC 16.5* 14.9*   HGB 12.9 15.0    289       Recent Labs   Lab 04/15/25  0651 04/14/25 1957   * 133*   POTASSIUM 4.0 4.3   CHLORIDE 100 97*   CO2 23 25   BUN 12.5 14.3   CR 0.88 0.84   * 119*   MARI 9.4 9.5       Imaging:  CT ABDOMEN PELVIS W CONTRAST 4/14/2025  BOWEL: Dilated appendix in the right lower quadrant measuring 11 mm, with 8 mm appendicolith at the appendiceal base. Two additional appendicoliths towards the tip of the appendix. Edematous in the adjacent mesentery and marked soft tissue thickening at the base of the cecum and ileocecal valve. No evidence for perforation or abscess. Several mildly dilated loops of fluid-filled small bowel mid and distal ileum.  IMPRESSION:   1.  Acute appendicitis.  2.  Marked soft tissue thickening at the base of the cecum and ileocecal valve, with dilatation of the distal small bowel which could represent a developing ileus or obstruction. Surgical consultation recommended.     Assessment/Plan:   Maria Luisa Garcia is a 74 year old female with a history of osteoarthritis, platelet function defect, and respiratory arrest secondary to epidural anesthesia while pregnant who presented to the ED on [place] with abdominal pain. ED work up was significant for hyponatremia, CRP 60.89, WBC 14.9, and CT scan with evidence of acute appendicitis with soft tissue thickening to the to cecum with dilation of the distal small  bowel. Patient was discussed with Dr. Chapa who recommended transfer to [place] for further evaluation by the surgery team. Prior to her transfer she was given Zosyn and hematology was consulted for management of her history of platelet dysfunction given the potential for operative intervention for her acute appendicitis. Her CT showed acute appendicitis with multiple appendicoliths and thickening at the base of her cecum that represents a difficult surgical target for a surgical staple line and increased the risk that she will need right colon resection. In this setting we will attempt to manage non-operatively with an interval appendectomy given her appenicolithiasis when her cecal inflammation has subsided.      - Admit to EGS  - NPO, IVF  - Continue IV zosyn  - Appreciate hematology recs: transfused 1 unit of platelets immediately pre-operatively, give benadryl 50 mg IV and Tylenol 650 mg as pre-meds prior to platelet transfusion, if excessive bleeding during or within 24 hours of procedure given an additional 1 unit of platelets with pre-meds  - Will monitor response to abx and non-op management.        Seen, examined, and discussed with chief resident, who will discuss with staff.    Zeynep Guy, MS3  [facility]    Resident/Fellow Attestation   I, Meredith Amador MD, was present with the medical/GAMALIEL student who participated in the service and in the documentation of the note.  I have verified the history and personally performed the physical exam and medical decision making.  I agree with the assessment and plan of care as documented in the note.      Meredith Amador MD  Urology PGY1  General Surgery Service  Date of Service (when I saw the patient): 04/15/25

## 2025-04-15 NOTE — PROGRESS NOTES
"/56   Pulse 83   Temp 97.6  F (36.4  C) (Oral)   Resp 16   Ht 1.6 m (5' 3\")   Wt 72.2 kg (159 lb 3.2 oz)   SpO2 95%   BMI 28.20 kg/m    Neuro: A&Ox4.   Cardiac: Afebrile, VSS.   Respiratory: RA   GI/: Voiding spontaneously. No BM this shift.   Diet/appetite: Tolerating diet. Denies nausea   Activity:independent  Pain:agreeable to current regimen  Skin: No new deficits noted.  Lines:PIV   Drains:None  No new complaints.Will continue to monitor and follow plan of care.    "

## 2025-04-16 ENCOUNTER — TELEPHONE (OUTPATIENT)
Dept: SURGERY | Facility: CLINIC | Age: 75
End: 2025-04-16
Payer: COMMERCIAL

## 2025-04-16 VITALS
HEART RATE: 71 BPM | RESPIRATION RATE: 16 BRPM | OXYGEN SATURATION: 97 % | SYSTOLIC BLOOD PRESSURE: 134 MMHG | BODY MASS INDEX: 28.21 KG/M2 | HEIGHT: 63 IN | DIASTOLIC BLOOD PRESSURE: 71 MMHG | WEIGHT: 159.2 LBS | TEMPERATURE: 98 F

## 2025-04-16 LAB
ANION GAP SERPL CALCULATED.3IONS-SCNC: 13 MMOL/L (ref 7–15)
BUN SERPL-MCNC: 10.9 MG/DL (ref 8–23)
CALCIUM SERPL-MCNC: 9.3 MG/DL (ref 8.8–10.4)
CHLORIDE SERPL-SCNC: 103 MMOL/L (ref 98–107)
CREAT SERPL-MCNC: 0.87 MG/DL (ref 0.51–0.95)
EGFRCR SERPLBLD CKD-EPI 2021: 69 ML/MIN/1.73M2
ERYTHROCYTE [DISTWIDTH] IN BLOOD BY AUTOMATED COUNT: 13.8 % (ref 10–15)
GLUCOSE SERPL-MCNC: 86 MG/DL (ref 70–99)
HCO3 SERPL-SCNC: 20 MMOL/L (ref 22–29)
HCT VFR BLD AUTO: 42.9 % (ref 35–47)
HGB BLD-MCNC: 13.8 G/DL (ref 11.7–15.7)
MAGNESIUM SERPL-MCNC: 2 MG/DL (ref 1.7–2.3)
MCH RBC QN AUTO: 29.3 PG (ref 26.5–33)
MCHC RBC AUTO-ENTMCNC: 32.2 G/DL (ref 31.5–36.5)
MCV RBC AUTO: 91 FL (ref 78–100)
PHOSPHATE SERPL-MCNC: 2.5 MG/DL (ref 2.5–4.5)
PLATELET # BLD AUTO: 290 10E3/UL (ref 150–450)
POTASSIUM SERPL-SCNC: 4.7 MMOL/L (ref 3.4–5.3)
RBC # BLD AUTO: 4.71 10E6/UL (ref 3.8–5.2)
SODIUM SERPL-SCNC: 136 MMOL/L (ref 135–145)
WBC # BLD AUTO: 15.8 10E3/UL (ref 4–11)

## 2025-04-16 PROCEDURE — 258N000003 HC RX IP 258 OP 636

## 2025-04-16 PROCEDURE — 84100 ASSAY OF PHOSPHORUS: CPT | Performed by: STUDENT IN AN ORGANIZED HEALTH CARE EDUCATION/TRAINING PROGRAM

## 2025-04-16 PROCEDURE — 83735 ASSAY OF MAGNESIUM: CPT | Performed by: STUDENT IN AN ORGANIZED HEALTH CARE EDUCATION/TRAINING PROGRAM

## 2025-04-16 PROCEDURE — 36415 COLL VENOUS BLD VENIPUNCTURE: CPT | Performed by: STUDENT IN AN ORGANIZED HEALTH CARE EDUCATION/TRAINING PROGRAM

## 2025-04-16 PROCEDURE — 250N000013 HC RX MED GY IP 250 OP 250 PS 637

## 2025-04-16 PROCEDURE — 80048 BASIC METABOLIC PNL TOTAL CA: CPT | Performed by: STUDENT IN AN ORGANIZED HEALTH CARE EDUCATION/TRAINING PROGRAM

## 2025-04-16 PROCEDURE — 250N000011 HC RX IP 250 OP 636: Performed by: SURGERY

## 2025-04-16 PROCEDURE — 85027 COMPLETE CBC AUTOMATED: CPT | Performed by: STUDENT IN AN ORGANIZED HEALTH CARE EDUCATION/TRAINING PROGRAM

## 2025-04-16 RX ADMIN — PIPERACILLIN AND TAZOBACTAM 3.38 G: 3; .375 INJECTION, POWDER, LYOPHILIZED, FOR SOLUTION INTRAVENOUS at 05:20

## 2025-04-16 RX ADMIN — SODIUM CHLORIDE, SODIUM LACTATE, POTASSIUM CHLORIDE, AND CALCIUM CHLORIDE: .6; .31; .03; .02 INJECTION, SOLUTION INTRAVENOUS at 04:59

## 2025-04-16 RX ADMIN — PIPERACILLIN AND TAZOBACTAM 3.38 G: 3; .375 INJECTION, POWDER, LYOPHILIZED, FOR SOLUTION INTRAVENOUS at 11:50

## 2025-04-16 RX ADMIN — ACETAMINOPHEN 650 MG: 325 TABLET, FILM COATED ORAL at 05:20

## 2025-04-16 ASSESSMENT — ACTIVITIES OF DAILY LIVING (ADL)
ADLS_ACUITY_SCORE: 54

## 2025-04-16 NOTE — MEDICATION SCRIBE - ADMISSION MEDICATION HISTORY
Medication Scribe Admission Medication History    Admission medication history is complete. The information provided in this note is only as accurate as the sources available at the time of the update.    Information Source(s): Patient via in-person    Pertinent Information:   amoxicillin (AMOXIL) 500 MG tablet     Take 4 pills 1 hour prior to dental procedure., only used when having dental procedure  Changes made to PTA medication list:  Added: None  Deleted:   celecoxib (CELEBREX) 100 MG capsule     Take 2 capsules (200 mg) by mouth 2 times didier   cephALEXin (KEFLEX) 500 MG capsule     Take 1 capsule (500 mg) by mouth 2 times daily   gabapentin (NEURONTIN) 300 MG capsule     Take 1 capsule (300 mg) by mouth 3 times daily,   hydrOXYzine (VISTARIL) 25 MG capsule     Take 1 capsule (25 mg) by mouth 3 times daily as needed for itching For severe pain,   hydrOXYzine (VISTARIL) 25 MG capsule     Take 1 capsule (25 mg) by mouth every 6 hours For severe pain,   polyethylene glycol (MIRALAX) 17 g packet     Take 17 g by mouth daily,   senna-docusate (SENOKOT-S/PERICOLACE) 8.6-50 MG tablet     Take 1 tablet by mouth 2 times daily,   Above medications currently not being taken by patient at this time as noted verbally by patient  Changed: None    Allergies reviewed with patient and updates made in EHR: yes    Medication History Completed By: Paulina Tompkins 4/15/2025 8:07 PM    PTA Med List   Medication Sig Note Last Dose/Taking    acetaminophen (TYLENOL) 325 MG tablet Take 2 tablets (650 mg) by mouth continuous prn for other (Give dose prior to any platelet infusion)  Unknown    amoxicillin (AMOXIL) 500 MG tablet Take 4 pills 1 hour prior to dental procedure. 4/15/2025: Procedure medication Unknown    calcium carbonate (OS-MARI) 600 MG tablet Take 600 mg by mouth 2 times daily (with meals)  4/14/2025 Morning    cholecalciferol (VITAMIN D3) 125 mcg (5000 units) capsule Take 125 mcg by mouth 2 times daily Winter time dose   4/14/2025 Morning    Coenzyme Q10 300 MG CAPS Take 300 mg % by mouth every morning  4/14/2025 Morning    Glucosamine-Chondroit-Vit C-Mn (GLUCOSAMINE CHONDR 1500 COMPLX PO) Take 2 tablets by mouth every morning Brand name: Bi Flex  4/14/2025 Morning    magnesium oxide (MAG-OX) 400 (240 Mg) MG tablet Take 400 mg by mouth daily  4/14/2025 Morning

## 2025-04-16 NOTE — TELEPHONE ENCOUNTER
Health Call Center    Phone Message    May a detailed message be left on voicemail: yes     Reason for Call: Other: Patient called as she is scheduled for 5/5/25 in General Surgery with the wrong provider.   Dr. Emilio Chapa said he would be seeing her and that is not who she is scheduled with - she will only see Dr. Emilio Chapa.  Please follow up with patient.     Action Taken: Message routed to:  [facility] CLR Nurses [facility]    Travel Screening: Not Applicable

## 2025-04-16 NOTE — PROGRESS NOTES
Surgery Progress Note  04/16/2025       Subjective:  - No acute events overnight. Abdominal pain is improved but present this morning. No N/V. Had a large BM last night and is passing gas. She is hungry this morning. Says she will not take gabapentin due to history of causing her hair loss. Last oxycodone last night 2200.     Objective:  Temp:  [97.6  F (36.4  C)-99  F (37.2  C)] 98.4  F (36.9  C)  Pulse:  [83-89] 83  Resp:  [16-18] 16  BP: (110-131)/(56-65) 110/65  Cuff Mean (mmHg):  [85] 85  SpO2:  [95 %-99 %] 98 %    I/O last 3 completed shifts:  In: 2071.66 [I.V.:1971.66; IV Piggyback:100]  Out: -       Gen: Awake, alert, NAD  Resp: NLB on RA  Abd: soft, nondistended, tender to palpation in RLQ. Positive rebound tenderness. Positive Rovsing sign.  Ext: WWP, no edema     Labs:  Recent Labs   Lab 04/16/25  0551 04/15/25  0650 04/14/25 1957   WBC 15.8* 16.5* 14.9*   HGB 13.8 12.9 15.0    243 289       Recent Labs   Lab 04/16/25  0551 04/15/25  0651 04/14/25 1957    134* 133*   POTASSIUM 4.7 4.0 4.3   CHLORIDE 103 100 97*   CO2 20* 23 25   BUN 10.9 12.5 14.3   CR 0.87 0.88 0.84   GLC 86 109* 119*   MARI 9.3 9.4 9.5   MAG 2.0  --   --    PHOS 2.5  --   --      UA RESULTS:  Recent Labs   Lab Test 04/15/25  1749   COLOR Straw   APPEARANCE Clear   URINEGLC Negative   URINEBILI Negative   URINEKETONE Negative   SG 1.006   UBLD Negative   URINEPH 7.5*   PROTEIN Negative   NITRITE Negative   LEUKEST Trace*   RBCU 1   WBCU 1        Imaging:  CT ABDOMEN PELVIS W CONTRAST 4/14/2025  BOWEL: Dilated appendix in the right lower quadrant measuring 11 mm, with 8 mm appendicolith at the appendiceal base. Two additional appendicoliths towards the tip of the appendix. Edematous in the adjacent mesentery and marked soft tissue thickening at the base of the cecum and ileocecal valve. No evidence for perforation or abscess. Several mildly dilated loops of fluid-filled small bowel mid and distal ileum.  IMPRESSION:   1.   Acute appendicitis.  2.  Marked soft tissue thickening at the base of the cecum and ileocecal valve, with dilatation of the distal small bowel which could represent a developing ileus or obstruction. Surgical consultation recommended.     Assessment/Plan:   Maria Luisa Garcia is a 74 year old female with a history of osteoarthritis, platelet function defect, and respiratory arrest secondary to epidural anesthesia while pregnant who presented to the ED on [place] with abdominal pain. ED work up was significant for hyponatremia, CRP 60.89, WBC 14.9, and CT scan with evidence of acute appendicitis with soft tissue thickening to the to cecum with dilation of the distal small bowel. Patient was discussed with Dr. Chapa who recommended transfer to [place] for further evaluation by the surgery team. Prior to her transfer she was given Zosyn and hematology was consulted for management of her history of platelet dysfunction given the potential for operative intervention for her acute appendicitis.     Her CT showed acute appendicitis with multiple appendicoliths and thickening at the base of her cecum that represents a difficult surgical target for a surgical staple line and increased the risk that she will need right colon resection. In this setting we will attempt to manage non-operatively with an interval appendectomy given her appenicolithiasis when her cecal inflammation has subsided. Her exam today is improved, she is having bowel function, her pain is reduced, she is hungry, and her WBC count is slightly reduced to 15.8 from 16.5.     - Advance diet to regular  - Continue IV zosyn  - discontinue gabapentin   - Pending trial of Regular diet today potential discharge this afternoon with consolidation of antibiotics to Augmentin.       Seen, examined, and discussed with chief resident, who will discuss with staff.    Zeynep Guy, MS3  [facility]    Resident/Fellow Attestation   I, Meredith Amador MD,  was present with the medical/GAMALIEL student who participated in the service and in the documentation of the note.  I have verified the history and personally performed the physical exam and medical decision making.  I agree with the assessment and plan of care as documented in the note.      Meredith Amador MD  PGY1  Date of Service (when I saw the patient): 04/16/25

## 2025-04-16 NOTE — TELEPHONE ENCOUNTER
REFERRAL INFORMATION:  Referring Provider:    Referring Clinic:    Reason for Visit/Diagnosis: Hospital follow up, Interval Appendectomy        FUTURE VISIT INFORMATION:  Appointment Date: 5/5/25  Appointment Time:      NOTES RECORD STATUS  DETAILS   OFFICE NOTE from Referring Provider N/A    OFFICE NOTE from Other Specialists N/A    HOSPITAL DISCHARGE SUMMARY/ ED VISITS  Internal Admission:  4/14/[id number]   PERTINENT LABS Internal    PATHOLOGY REPORTS (RELATED) N/A    IMAGING (CT, MRI, US, XR)  Internal 4/14/25-CT abd pel     Records Requested    Facility    Fax:    Outcome

## 2025-04-16 NOTE — DISCHARGE SUMMARY
[facility]  Discharge Summary  General Surgery     Maria Luisa Garcia MRN# [id number]   YOB: 1950 Age: 74 year old     Date of Admission:  4/14/2025  Date of Discharge::  4/16/2025  Admitting Physician:  Emilio Chapa MD  Discharge Physician:  Emilio Chapa MD  Primary Care Physician:        No Ref-Primary, Physician          Admission Diagnoses:   Appendicitis [K37]          Discharge Diagnosis:   Appendicitis with cecal inflammation         Procedures:   None            Consultations:   HEMATOLOGY IP CONSULT          Brief History of Illness:   Maria Luisa Garcia is a 74 year old female with a history of osteoarthritis, platelet function defect, and respiratory arrest secondary to epidural anesthesia while pregnant who presented to the ED 4/15/25 on [facility] with abdominal pain. ED work up was significant for hyponatremia, CRP 60.89, WBC 14.9, and CT scan with evidence of acute appendicitis with soft tissue thickening to the to cecum with dilation of the distal small bowel. Patient was discussed with Dr. Chapa who recommended transfer to [facility] for further evaluation by the surgery team. Prior to her transfer she was given Zosyn and hematology was consulted for management of her history of platelet dysfunction given the potential for operative intervention for her acute appendicitis.      Patient seen at bedside upon transfer to the [facility] ED. Patient states that her abdominal pain started yesterday 4/14/25 morning, it worsened throughout the day and noticed it more prominently around 4 pm when she was leaving work. When she arrived home she thought the pain may be secondary to her not eating or drinking very much throughout the day. She then noticed abdominal pain with palpation and she called her son over to bring her to the ED. She most recently had a bowel movement yesterday 4/14/25. Has had some mild nausea with the narcotic pain medications but denies any  emesis. Reports that she last ate Sunday 4/13/25. Denies being on any blood thinners. For abdominal surgical history she has had two c-sections. 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            Hospital Course:   Her CT showed acute appendicitis with multiple appendicoliths and thickening at the base of her cecum that represents a difficult surgical target for a surgical staple line and increased the risk that she will need right colon resection. In this setting we elected to manage non-operatively with an interval appendectomy in 6-8 weeks given her appenicolithiasis when her cecal inflammation has subsided. Her exam today 4/16/25 is improved, she is having bowel function, her pain is reduced, she is hungry, and her WBC count is slightly reduced to 15.8 from 16.5. her last dose of narcotic pain medication was 4/15/25 2200. We advanced her diet to regular and consolidated her antibiotics to oral augmentin which she will continue for 4 days. On the day of discharge, she was tolerating a low residue diet, her pain was well controlled with oral pain medications, she was voiding spontaneously, and ambulating independently. Patient with follow up with general surgery for appendectomy in 6-8 weeks.           Imaging Studies:     Results for orders placed or performed during the hospital encounter of 04/14/25   CT Abdomen Pelvis w Contrast    Narrative    EXAM: CT ABDOMEN PELVIS W CONTRAST  LOCATION: [facility]  DATE: 4/14/2025    INDICATION: RLQ pain  COMPARISON: None.  TECHNIQUE: CT scan of the abdomen and pelvis was performed following injection of IV contrast. Multiplanar reformats were obtained. Dose reduction techniques were used.  CONTRAST: 78 mL Isovue 370    FINDINGS:   LOWER CHEST: Calcified granuloma right middle lobe.    HEPATOBILIARY: Mild diffuse fatty infiltration of the liver. Calcified hepatic granuloma.  Gallstones.    PANCREAS: Small focus of fat at the junction of the pancreatic head and body. No inflammatory changes.    SPLEEN: Normal.    ADRENAL GLANDS: Normal.    KIDNEYS/BLADDER: 1 mm nonobstructing stone right kidney and tiny right renal cyst. Normal left kidney.    BOWEL: Dilated appendix in the right lower quadrant measuring 11 mm, with 8 mm appendicolith at the appendiceal base. Two additional appendicoliths towards the tip of the appendix. Edematous in the adjacent mesentery and marked soft tissue thickening at   the base of the cecum and ileocecal valve. No evidence for perforation or abscess. Several mildly dilated loops of fluid-filled small bowel mid and distal ileum.    LYMPH NODES: Normal.    VASCULATURE: Normal.    PELVIC ORGANS: Small amount of free pelvic fluid.    MUSCULOSKELETAL: Degenerative disc and facet disease lumbar spine.      Impression    IMPRESSION:   1.  Acute appendicitis.  2.  Marked soft tissue thickening at the base of the cecum and ileocecal valve, with dilatation of the distal small bowel which could represent a developing ileus or obstruction. Surgical consultation recommended.              Medications Prior to Admission:     Prior to Admission medications    Medication Sig Last Dose Taking? Auth Provider Long Term End Date   acetaminophen (TYLENOL) 325 MG tablet Take 2 tablets (650 mg) by mouth continuous prn for other (Give dose prior to any platelet infusion) Unknown Yes Coco Mora APRN CNS     amoxicillin-clavulanate (AUGMENTIN) 875-125 MG tablet Take 1 tablet by mouth 2 times daily for 7 days.  Yes Meredith Amador MD  4/23/25   calcium carbonate (OS-MARI) 600 MG tablet Take 600 mg by mouth 2 times daily (with meals) 4/14/2025 Morning Yes Unknown, Entered By History     cholecalciferol (VITAMIN D3) 125 mcg (5000 units) capsule Take 125 mcg by mouth 2 times daily Winter time dose 4/14/2025 Morning Yes Unknown, Entered By History     Coenzyme Q10 300 MG CAPS Take 300  mg % by mouth every morning 4/14/2025 Morning Yes Unknown, Entered By History     Glucosamine-Chondroit-Vit C-Mn (GLUCOSAMINE CHONDR 1500 COMPLX PO) Take 2 tablets by mouth every morning Brand name: Bi Flex 4/14/2025 Morning Yes Reported, Patient     magnesium oxide (MAG-OX) 400 (240 Mg) MG tablet Take 400 mg by mouth daily 4/14/2025 Morning Yes Unknown, Entered By History              Discharge Medications:     Current Discharge Medication List        START taking these medications    Details   amoxicillin-clavulanate (AUGMENTIN) 875-125 MG tablet Take 1 tablet by mouth 2 times daily for 7 days.  Qty: 14 tablet, Refills: 0    Associated Diagnoses: Other acute appendicitis           CONTINUE these medications which have NOT CHANGED    Details   acetaminophen (TYLENOL) 325 MG tablet Take 2 tablets (650 mg) by mouth continuous prn for other (Give dose prior to any platelet infusion)  Qty: 60 tablet, Refills: 0    Associated Diagnoses: Tricompartment osteoarthritis of both knees      calcium carbonate (OS-MARI) 600 MG tablet Take 600 mg by mouth 2 times daily (with meals)      cholecalciferol (VITAMIN D3) 125 mcg (5000 units) capsule Take 125 mcg by mouth 2 times daily Winter time dose      Coenzyme Q10 300 MG CAPS Take 300 mg % by mouth every morning      Glucosamine-Chondroit-Vit C-Mn (GLUCOSAMINE CHONDR 1500 COMPLX PO) Take 2 tablets by mouth every morning Brand name: Bi Flex      magnesium oxide (MAG-OX) 400 (240 Mg) MG tablet Take 400 mg by mouth daily           STOP taking these medications       amoxicillin (AMOXIL) 500 MG tablet Comments:   Reason for Stopping:                       Medications Discontinued or Adjusted During This Hospitalization:   No change           Antibiotics Prescribed at Discharge:   Augmentin, Duration: 7 days            Day of Discharge Physical Exam:   Temp:  [97.6  F (36.4  C)-98.4  F (36.9  C)] 98  F (36.7  C)  Pulse:  [71-83] 71  Resp:  [16-18] 16  BP: (110-134)/(56-71) 134/71  Cuff  Mean (mmHg):  [85] 85  SpO2:  [97 %-99 %] 97 %    General: awake, alert, no acute distress, laying comfortably in bed   CV: warm, well perfused   Pulm: breathing comfortably on room air   Abdomen: soft, non-distended, tender in RLQ, with rebound and rovsing sign. no guarding   Extremities: no edema, moving all extremities spontaneously and without apparent deficit            Final Pathology Result:   No specimen            Discharge Instructions and Follow-Up:     Discharge Procedure Orders   Reason for your hospital stay   Order Comments: Acute appendicitis with cecal inflammation     Activity   Order Comments: Your activity upon discharge: activity as tolerated. Do not drive if you've taken opioid (narcotic) medications within the last 24 hours.     Order Specific Question Answer Comments   Is discharge order? Yes      ADULT [facility] Specialty Follow-up and recommended labs and tests   Order Comments: Follow up: Follow up with general surgery clinic within 2-3 weeks. For planning of your definitive treatment.     Appointments on [place] and/or [facility] (with [facility] or [facility] provider or service). Call [phone] if you haven't heard regarding these appointments within 7 days of discharge.     Discharge Instructions   Order Comments: Discharge Instructions  Activity  - No activity restrictions  - Do not drive until you can press the brake pedal quickly and fully without pain  - Do not operate a motor vehicle while taking narcotic pain medications    Incisions  - You do not have any incisions    Drain Care  - You do not have a drain.  Specific care/instructions:  Not Applicable    Medications  - Do not take any additional Tylenol (acetaminophen) while using a narcotic pain medication which includes acetaminophen  - Do not take more than 4,000mg of acetaminophen in any 24 hour period, as this can cause liver damage  - You will be discharge with antibiotics (Augmentin) please use as prescribed for 7 days.      Follow-Up:  - Call your primary care provider to touch base regarding your recent admission.    - Call or return sooner than your regularly scheduled visit if you develop any of the following: fever >101.5, uncontrolled pain, uncontrolled nausea or vomiting, as well as increased redness, swelling, or drainage from your wound.   -  A nurse from the General Surgery Clinic will contact you 24 hours, or next business day, after your discharge from the hospital.  If you have questions or to schedule a follow up appointment please call the General Surgery Clinic at [phone].  Call [phone] and ask to speak with the Surgery resident on call if you are having troubles in the evenings, at night, or on the weekend.  -  If you are receiving home care please inform your home care nurse of our contact number.     Diet   Order Comments: Follow this diet upon discharge: Current Diet:Orders Placed This Encounter      Regular Diet Adult     Order Specific Question Answer Comments   Is discharge order? Yes               Home Health Care:     Not needed           Discharge Disposition:     Discharged to home      Condition at discharge: Stable    Patient was seen, examined, and discussed on day of discharge with chief resident Dr. Jaky Heath, who discussed with staff surgeon Dr. Emilio Chapa.    Zeynep Guy, MS3  [facility]    Resident/Fellow Attestation   I, Meredith Amador MD, was present with the medical/GAMALIEL student who participated in the service and in the documentation of the note.  I have verified the history and personally performed the physical exam and medical decision making.  I agree with the assessment and plan of care as documented in the note.      Will follow-up in 2-3 weeks in outpatient clinic to set up definitive treatment in 6-8 weeks.     Meredith Amador MD  PGY1  Date of Service (when I saw the patient): 04/16/25

## 2025-04-16 NOTE — TELEPHONE ENCOUNTER
[email or web address] message sent to the patient with appointment changed to 4/30 at 0830 with Dr. Chapa.

## 2025-04-16 NOTE — PLAN OF CARE
"./65 (BP Location: Left arm)   Pulse 83   Temp 98.4  F (36.9  C) (Oral)   Resp 16   Ht 1.6 m (5' 3\")   Wt 72.2 kg (159 lb 3.2 oz)   SpO2 98%   BMI 28.20 kg/m       .Neuro: A&Ox4.    Cardiac: Not on tele, Afebrile, OVSS.   Respiratory: On RA.  GI/: Voiding spontaneously  .   Diet/appetite: Tolerating clear liquid diet.  Activity:  Independent.  Pain: Oxy and Tylenol x1 for abdominal pain, at acceptable level on current regimen.   Skin: No new deficits noted.  LDA's: L PIV SL    Plan: Continue with POC. Notify primary team with changes.     "

## 2025-04-17 ENCOUNTER — PATIENT OUTREACH (OUTPATIENT)
Dept: SURGERY | Facility: CLINIC | Age: 75
End: 2025-04-17
Payer: COMMERCIAL

## 2025-04-17 NOTE — PROGRESS NOTES
RN Post-Op/Post-Discharge Care Coordination Note    Ms. Maria Luisa Garcia is a 74 year old female who was admitted for appendicitis, managed non-operatively, discharged 4/16 by Dr. Emilio Chapa.  Spoke with Patient.    Support  Patient able to care for self independently.     Health Status  Nausea/Vomiting: Patient denies nausea/vomiting.  Eating/drinking: Patient is able to eat and drink without any complaints.  Diet:  Regular  Bowel habits: Patient reports constipation. She is passing gas. Increased her fluid intake and activity level and feels things are starting to move.   Fevers/chills: Patient denies any fever or chills.  Pain: tolerable with the use of OTC Tylenol per package instructions.  New Medications:  Augmentin    Activity/Restrictions  No restrictions    Forms/Letters  No    All of her questions were answered.She will call this office if she has any further questions and/or concerns.      Appointment scheduled on 4/30 at 0830 with Dr. Chapa.     A copy of this note was routed to the primary surgeon.    Whom and When to Call  Patient acknowledges understanding of how to manage any medication changes and when to seek medical care.     Patient advised that if after hour medical concerns arise to please call [phone] and choose option 4 to speak to the physician on call.

## 2025-04-29 ENCOUNTER — PRE VISIT (OUTPATIENT)
Dept: SURGERY | Facility: CLINIC | Age: 75
End: 2025-04-29
Payer: COMMERCIAL

## 2025-04-29 NOTE — TELEPHONE ENCOUNTER
Patient Telephone Reminder Call    Date of call:  04/29/25  Phone numbers:  Home number on file [phone] (home)    Reached patient/confirmed appointment:  Yes  Appointment with:   Dr. Emilio Chapa  Reason for visit:  Hospital follow up, interval appendectomy  Remind patient that this visit is a consultation only, NO procedure:  Yes  Can this visit be changed to a video visit:  No

## 2025-04-30 ENCOUNTER — OFFICE VISIT (OUTPATIENT)
Dept: SURGERY | Facility: CLINIC | Age: 75
End: 2025-04-30
Payer: COMMERCIAL

## 2025-04-30 VITALS
SYSTOLIC BLOOD PRESSURE: 142 MMHG | HEIGHT: 63 IN | WEIGHT: 156.3 LBS | BODY MASS INDEX: 27.7 KG/M2 | HEART RATE: 85 BPM | DIASTOLIC BLOOD PRESSURE: 84 MMHG | OXYGEN SATURATION: 98 %

## 2025-04-30 DIAGNOSIS — K35.30 ACUTE APPENDICITIS WITH LOCALIZED PERITONITIS, WITHOUT GANGRENE OR ABSCESS, UNSPECIFIED WHETHER PERFORATION PRESENT: Primary | ICD-10-CM

## 2025-04-30 ASSESSMENT — PAIN SCALES - GENERAL: PAINLEVEL_OUTOF10: NO PAIN (0)

## 2025-04-30 NOTE — NURSING NOTE
"Chief Complaint   Patient presents with    New Patient     Hospital follow up, interval appendectomy       Vitals:    04/30/25 0805   BP: (!) 142/84   BP Location: Left arm   Patient Position: Sitting   Cuff Size: Adult Regular   Pulse: 85   SpO2: 98%   Weight: 70.9 kg (156 lb 4.8 oz)   Height: 1.6 m (5' 3\")       Body mass index is 27.69 kg/m .                          Joel Daigle, EMT    "

## 2025-04-30 NOTE — PROGRESS NOTES
"I met with Maria Luisa Garcia today for follow-up of her recent admission for appendicitis- managed with ABX given the degree of inflammation and cecal inflammation.  This was on 4/14    She was discharged on ABX.      Since discharge she has been doing well.  No longer on ABX.  She says last weekend she felt   \"Off\" but has trouble describing this.  No real increase in pain, no obvious infectious symptoms but she felt off in a way that she has not prior.  Has also been having issues with constipation.    She reports a remote colonoscopy.    PE:  BP (!) 142/84 (BP Location: Left arm, Patient Position: Sitting, Cuff Size: Adult Regular)   Pulse 85   Ht 1.6 m (5' 3\")   Wt 70.9 kg (156 lb 4.8 oz)   SpO2 98%   BMI 27.69 kg/m    Alert, pleasant  RRR  No resp distress  ABd is soft, nondistended, nontender    No new imaging to review    A/P  Resolved acute appendicitis. We discussed surgery and its risks/benefits. Discussed option of just observation (and repeat colonoscopy).  My recommendation was to perform appendectomy and she agrees.  Ideally this would be in roughly 6-8 weeks from now.    Plan for laparoscopic appendectomy in Mid June (can tailor to patient's schedule)  Will need admission afterwards and platelet transfusion (Heme note in chart from her admission with details regarding her platelet disorder)  "

## 2025-04-30 NOTE — LETTER
"4/30/2025       RE: Maria Luisa Garcia  [address] [place] [address]     Dear Colleague,    Thank you for referring your patient, Maria Luisa Garcia, to the [facility] [place] at [facility]. Please see a copy of my visit note below.    I met with Maria Luisa Garcia today for follow-up of her recent admission for appendicitis- managed with ABX given the degree of inflammation and cecal inflammation.  This was on 4/14    She was discharged on ABX.      Since discharge she has been doing well.  No longer on ABX.  She says last weekend she felt   \"Off\" but has trouble describing this.  No real increase in pain, no obvious infectious symptoms but she felt off in a way that she has not prior.  Has also been having issues with constipation.    She reports a remote colonoscopy.    PE:  BP (!) 142/84 (BP Location: Left arm, Patient Position: Sitting, Cuff Size: Adult Regular)   Pulse 85   Ht 1.6 m (5' 3\")   Wt 70.9 kg (156 lb 4.8 oz)   SpO2 98%   BMI 27.69 kg/m    Alert, pleasant  RRR  No resp distress  ABd is soft, nondistended, nontender    No new imaging to review    A/P  Resolved acute appendicitis. We discussed surgery and its risks/benefits. Discussed option of just observation (and repeat colonoscopy).  My recommendation was to perform appendectomy and she agrees.  Ideally this would be in roughly 6-8 weeks from now.    Plan for laparoscopic appendectomy in Mid June (can tailor to patient's schedule)  Will need admission afterwards and platelet transfusion (Heme note in chart from her admission with details regarding her platelet disorder)      Again, thank you for allowing me to participate in the care of your patient.      Sincerely,    Emilio Chapa MD    "

## 2025-05-01 ENCOUNTER — PATIENT OUTREACH (OUTPATIENT)
Dept: SURGERY | Facility: CLINIC | Age: 75
End: 2025-05-01
Payer: COMMERCIAL

## 2025-05-01 ENCOUNTER — PREP FOR PROCEDURE (OUTPATIENT)
Dept: SURGERY | Facility: CLINIC | Age: 75
End: 2025-05-01
Payer: COMMERCIAL

## 2025-05-01 DIAGNOSIS — K37 APPENDICITIS: Primary | ICD-10-CM

## 2025-05-01 RX ORDER — CEFAZOLIN SODIUM 2 G/50ML
2 SOLUTION INTRAVENOUS
OUTPATIENT
Start: 2025-05-01

## 2025-05-01 RX ORDER — CEFAZOLIN SODIUM 2 G/50ML
2 SOLUTION INTRAVENOUS SEE ADMIN INSTRUCTIONS
OUTPATIENT
Start: 2025-05-01

## 2025-05-01 RX ORDER — METRONIDAZOLE 500 MG/100ML
500 INJECTION, SOLUTION INTRAVENOUS
OUTPATIENT
Start: 2025-05-01

## 2025-05-05 ENCOUNTER — PRE VISIT (OUTPATIENT)
Dept: SURGERY | Facility: CLINIC | Age: 75
End: 2025-05-05

## 2025-05-08 ENCOUNTER — TELEPHONE (OUTPATIENT)
Dept: SURGERY | Facility: CLINIC | Age: 75
End: 2025-05-08
Payer: COMMERCIAL

## 2025-05-08 NOTE — TELEPHONE ENCOUNTER
Called patient to schedule surgery with Dr. Chapa    Date of Surgery: 6/20/25    Approximate arrival time given:  Yes    Location of surgery: [facility]    Pre-Op H&P: With PAC on 6/16/25 - patient did not want a virtual appointment and preferred in person.    Post-Op Appt Date with Surgeon: NA     Imaging needed:  Not Applicable    Discussed with patient PAC RN will provide arrival time and instructions for surgery at the time of the appointment: [[facility] locations only]: Yes    Standard Surgery Packet Sent: Yes 05/08/25  via Mail - Standard    Additional Comments: NA      All patients questions were answered and was instructed to review surgical packet and call back with any questions or concerns.       Kat Arita on 5/8/2025 at 1:00 PM

## 2025-06-15 LAB
ABO + RH BLD: NORMAL
BLD GP AB SCN SERPL QL: NEGATIVE
SPECIMEN EXP DATE BLD: NORMAL

## 2025-06-16 ENCOUNTER — RESULTS FOLLOW-UP (OUTPATIENT)
Dept: SURGERY | Facility: CLINIC | Age: 75
End: 2025-06-16

## 2025-06-16 ENCOUNTER — OFFICE VISIT (OUTPATIENT)
Dept: SURGERY | Facility: CLINIC | Age: 75
End: 2025-06-16
Payer: COMMERCIAL

## 2025-06-16 ENCOUNTER — APPOINTMENT (OUTPATIENT)
Dept: LAB | Facility: CLINIC | Age: 75
End: 2025-06-16
Payer: COMMERCIAL

## 2025-06-16 ENCOUNTER — LAB (OUTPATIENT)
Dept: LAB | Facility: CLINIC | Age: 75
End: 2025-06-16
Payer: COMMERCIAL

## 2025-06-16 VITALS
HEIGHT: 63 IN | SYSTOLIC BLOOD PRESSURE: 145 MMHG | BODY MASS INDEX: 27.29 KG/M2 | WEIGHT: 154 LBS | RESPIRATION RATE: 16 BRPM | DIASTOLIC BLOOD PRESSURE: 73 MMHG | HEART RATE: 77 BPM | TEMPERATURE: 98.1 F | OXYGEN SATURATION: 98 %

## 2025-06-16 DIAGNOSIS — K37 APPENDICITIS, UNSPECIFIED APPENDICITIS TYPE: ICD-10-CM

## 2025-06-16 DIAGNOSIS — Z01.818 PRE-OPERATIVE EXAMINATION: Primary | ICD-10-CM

## 2025-06-16 DIAGNOSIS — Z01.818 PRE-OPERATIVE EXAMINATION: ICD-10-CM

## 2025-06-16 LAB
ABO + RH BLD: NORMAL
BLD GP AB SCN SERPL QL: NEGATIVE
ERYTHROCYTE [DISTWIDTH] IN BLOOD BY AUTOMATED COUNT: 13.6 % (ref 10–15)
HCT VFR BLD AUTO: 46.4 % (ref 35–47)
HGB BLD-MCNC: 14.9 G/DL (ref 11.7–15.7)
MCH RBC QN AUTO: 28.8 PG (ref 26.5–33)
MCHC RBC AUTO-ENTMCNC: 32.1 G/DL (ref 31.5–36.5)
MCV RBC AUTO: 90 FL (ref 78–100)
PLATELET # BLD AUTO: 268 10E3/UL (ref 150–450)
RBC # BLD AUTO: 5.17 10E6/UL (ref 3.8–5.2)
SPECIMEN EXP DATE BLD: NORMAL
WBC # BLD AUTO: 5.1 10E3/UL (ref 4–11)

## 2025-06-16 PROCEDURE — 99202 OFFICE O/P NEW SF 15 MIN: CPT | Performed by: PHYSICIAN ASSISTANT

## 2025-06-16 PROCEDURE — 85027 COMPLETE CBC AUTOMATED: CPT | Performed by: PATHOLOGY

## 2025-06-16 PROCEDURE — 36415 COLL VENOUS BLD VENIPUNCTURE: CPT | Performed by: PATHOLOGY

## 2025-06-16 RX ORDER — TURMERIC ROOT EXTRACT 500 MG
500 TABLET ORAL EVERY MORNING
COMMUNITY

## 2025-06-16 ASSESSMENT — ENCOUNTER SYMPTOMS: SEIZURES: 0

## 2025-06-16 ASSESSMENT — PAIN SCALES - GENERAL: PAINLEVEL_OUTOF10: NO PAIN (0)

## 2025-06-16 ASSESSMENT — LIFESTYLE VARIABLES: TOBACCO_USE: 0

## 2025-06-16 NOTE — PATIENT INSTRUCTIONS
Preparing for Your Surgery      Name:  Mari aLuisa Garcia   MRN:  [id number]   :  1950   Today's Date:  2025     The [place] [employer] [address] Construction Project                                Timeline 2025 -2025    This project will affect travel to the [facility] and [facility], as well as the [facility].      Please check the [facility] [address] project website for the most up to date information and give yourself additional time to reach your destination.        Arriving for surgery:  Surgery date:  25  Arrival time:  5:30 am  Surgery time: 7:30 am    Please come to:     Please come to:       [facility]    [address] [place]  [address]     The [facility] [place] Patient/Visitor Ramp is at [address]. Patients and visitors who self-park will receive the reduced hospital parking rate. If the Patient /Visitor Ramp is full, please follow the signs to the [employer] car park located at the [facility] entrance.      [employer] parking is available (24 hours/ 7 days a week)      Discounted parking pass options are available for patients and visitors. They can be purchased at the [employer] desk at the [facility] entrance.     -    Stop at the security desk and they will direct surgery patients to the Surgery Check in and Family Lounge. [phone]        - If you need directions, a wheelchair or an escort please stop at the Information/security desk in the lobby.     What can I eat or drink?  -  You may eat and drink normally up to 8 hours prior to arrival time. (Until 9:30 pm on 25)  -  You may have clear liquids until 2 hours prior to arrival time. (Until 3:30 am on 25)    Examples of clear liquids:  Water  Clear broth  Juices (apple, white grape, white cranberry  and cider) without pulp  Noncarbonated,  powder based beverages  (lemonade and Rocky-Aid)  Sodas (Sprite, 7-Up, ginger ale and seltzer)  Coffee or tea (without milk or cream)  Gatorade    -  No Alcohol or cannabis products for at least 24 hours before surgery.     Which medicines can I take?    Hold Multivitamin for the next days before surgery.  Hold Supplements for the next days before surgery. CO Q 10 & Glucosamine Chondroitin & Turmeric      Hold Ibuprofen (Advil, Motrin) for 1 day(s) before surgery--unless otherwise directed by surgeon.  Hold Naproxen (Aleve) for 4 days before surgery.    -  DO NOT take these medications the day of surgery:   Calcium carbonate    Vitamin D- if taking    Magnesium oxide   Probiotic       -  PLEASE TAKE these medications the day of surgery:   Acetaminophen (Tylenol) as needed      How do I prepare myself?  - Please take 2 showers (one the night prior to surgery and one the morning of surgery) using Scrubcare or Hibiclens soap.    Use this soap only from the neck to your toes. Avoid genital area      Leave the soap on your skin for one minute--then rinse thoroughly.      You may use your own shampoo and conditioner. No other hair products.   - Please remove all jewelry and body piercings.  - No lotions, deodorants or fragrance.  - No makeup or fingernail polish.   - Bring your ID and insurance card.    -For patients being admitted to the [facility]  Family members are to take the patient belongings with them and place them in the lockers provided in the [facility] Lounge.  Please limit the items you bring to 1 bag as the lockers are small.      -If you use a CPAP machine, please bring the CPAP machine, tubing, and mask to hospital.    -If you have a Deep Brain Stimulator, Spinal Cord Stimulator, or any Neuro Stimulator device---you must bring the remote control to the hospital.      ALL PATIENTS GOING HOME THE SAME DAY OF SURGERY ARE REQUIRED TO HAVE A RESPONSIBLE ADULT TO DRIVE AND BE IN ATTENDANCE WITH THEM FOR 24  HOURS FOLLOWING SURGERY.    Covid testing policy as of 12/06/2022  Your surgeon will notify and schedule you for a COVID test if one is needed before surgery--please direct any questions or COVID symptoms to your surgeon      Questions or Concerns:    - For any questions regarding the day of surgery or your hospital stay, please contact the Pre Admission Nursing Office at [phone].       - If you have health changes between today and your surgery, please call your surgeon.       - For questions after surgery, please call your surgeons office.           Current Visitor Guidelines    2 adult visitors for adult patients in the pre op area    If additional visitors come (beyond a patient care attendant or a group home caregiver), the additional visitors will be asked to wait in the main lobby of the hospital    Visiting hours: 8 a.m. to 8:30 p.m.    Patients confirmed or suspected to have symptoms of COVID 19 or flu:     No visitors allowed for adult patients.   Children (under age 18) can have 1 named visitor.     People who are sick or showing symptoms of COVID 19 or flu:    Are not allowed to visit patients--we can only make exceptions in special situations.       Please follow these guidelines for your visit:          Please maintain social distance          Masking is optional--however at times you may be asked to wear a mask for the safety of yourself and others     Clean your hands with alcohol hand . Do this when you arrive at and leave the building and patient room,    And again after you touch your mask or anything in the room.     Go directly to and from the room you are visiting.     Stay in the patient s room during your visit. Limit going to other places in the hospital as much as possible     Leave bags and jackets at home or in the car.     For everyone s health, please don t come and go during your visit. That includes for smoking   during your visit.

## 2025-06-16 NOTE — H&P
Pre-Operative H & P     CC:  Preoperative exam to assess for increased cardiopulmonary risk while undergoing surgery and anesthesia.    Date of Encounter: 6/16/2025  Primary Care Physician:  No Ref-Primary, Physician     Reason for visit:   Encounter Diagnoses   Name Primary?    Pre-operative examination Yes    Appendicitis, unspecified appendicitis type        HPI  Maria Luisa LUNA Person is a 74 year old female who presents for pre-operative H & P in preparation for  Procedure Information       Case: [id number] Date/Time: 06/20/25 0730    Procedure: APPENDECTOMY, LAPAROSCOPIC (Abdomen)    Anesthesia type: General    Diagnosis: Appendicitis [K37]    Pre-op diagnosis: Appendicitis [K37]    Location:  OR  /  OR    Providers: Emilio Chapa MD            The patient is a 74-year-old woman with past medical history significant for history of platelet dysfunction, history of respiratory cardiac arrest following displaced epidural, fatty liver, osteoarthritis and appendicitis.  She was treated in the hospital with antibiotics on 4/14/2025 and now presents for the procedure as above.    History is obtained from the patient and chart review    Hx of abnormal bleeding or anti-platelet use: platelet dysfunction    Menstrual history: No LMP recorded. Patient is postmenopausal.:      Past Medical History  Past Medical History:   Diagnosis Date    Appendicitis     Arthritis     Fatty liver     History of anesthesia reaction     epidural placement with labor causing respiratory and cardiac arrest    Platelet function defect (H)        Past Surgical History  Past Surgical History:   Procedure Laterality Date    ARTHROPLASTY KNEE Left 1/8/2021    Procedure: Left total knee arthroplasty;  Surgeon: Walter Rodriguez MD;  Location: [facility] OR    ARTHROPLASTY KNEE Right 2/11/2021    Procedure: Right Total Knee Arthroplasty;  Surgeon: Walter Rodriguez MD;  Location: [facility] OR    ARTHROSCOPIC RECONSTRUCTION ANTERIOR CRUCIATE LIGAMENT       left    ARTHROSCOPY KNEE      right    BACK SURGERY      lumbar    CARPAL TUNNEL RELEASE RT/LT      bilateral    COLONOSCOPY      REPAIR HAMMER TOE BILATERAL Bilateral 12/10/2014    Procedure: REPAIR HAMMER TOE BILATERAL;  Surgeon: Catrachito Estrella MD;  Location: [facility] OR    WRIST SURGERY      right, fracture repair       Prior to Admission Medications  Current Outpatient Medications   Medication Sig Dispense Refill    acetaminophen (TYLENOL) 325 MG tablet Take 2 tablets (650 mg) by mouth continuous prn for other (Give dose prior to any platelet infusion) 60 tablet 0    calcium carbonate (OS-MARI) 600 MG tablet Take 600 mg by mouth 2 times daily (with meals)      cholecalciferol (VITAMIN D3) 125 mcg (5000 units) capsule Take 250 mcg by mouth every morning. Winter time dose      Coenzyme Q10 300 MG CAPS Take 300 mg by mouth every morning.      Glucosamine-Chondroit-Vit C-Mn (GLUCOSAMINE CHONDR 1500 COMPLX PO) Take 2 tablets by mouth every morning Brand name: Bi Flex      magnesium oxide (MAG-OX) 400 (240 Mg) MG tablet Take 400 mg by mouth every morning.      Multiple Vitamins-Minerals (PRESERVISION AREDS 2 PO) Take 1 tablet by mouth every morning.      Probiotic Product (PROBIOTIC BLEND PO) Take 1 tablet by mouth every morning.      Turmeric 500 MG TABS Take 500 mg by mouth every morning.         Allergies  Allergies   Allergen Reactions    Clindamycin Phosphate Anaphylaxis    Codeine Nausea and Vomiting     Pt can tolerate one tablet. Has N/V when taking 2 tablets    Gabapentin Other (See Comments)     Hair loss       Social History  Social History     Socioeconomic History    Marital status:      Spouse name: Not on file    Number of children: 2    Years of education: Not on file    Highest education level: Not on file   Occupational History    Occupation: RN,    Tobacco Use    Smoking status: Never    Smokeless tobacco: Never   Substance and Sexual Activity    Alcohol use: Yes      Comment: socially 1 drik week    Drug use: No    Sexual activity: Never   Other Topics Concern    Parent/sibling w/ CABG, MI or angioplasty before 65F 55M? Not Asked   Social History Narrative    Not on file     Social Drivers of Health     Financial Resource Strain: Not on file   Food Insecurity: Not on file   Transportation Needs: Not on file   Physical Activity: Not on file   Stress: Not on file   Social Connections: Not on file   Interpersonal Safety: Not on file   Housing Stability: Not on file       Family History  Family History   Problem Relation Age of Onset    Hypertension Mother     Melanoma Mother 84        malignant melanoma of ethmoid sinus    Diabetes Father     Hypertension Father     Parkinsonism Father     Hypertension Sister     No Known Problems Brother     Deep Vein Thrombosis No family hx of     Anesthesia Reaction No family hx of        Review of Systems  The complete review of systems is negative other than noted in the HPI or here.   Anesthesia Evaluation   Pt has had prior anesthetic. Type: General.    History of anesthetic complications  - .  Remote history of epidural placement placement being too high causing respiratory and cardiac arrest.    ROS/MED HX  ENT/Pulmonary:  - neg pulmonary ROS  (-) tobacco use   Neurologic:  - neg neurologic ROS  (-) no seizures and no CVA   Cardiovascular:     (+)  - -   -  - -                                 Previous cardiac testing   Echo: Date: Results:    Stress Test:  Date: Results:    ECG Reviewed:  Date: 2010 Results:  Sinus rhythm with occasional Premature ventricular complexes  Otherwise normal ECG     Cath:  Date: Results:      METS/Exercise Tolerance: >4 METS    Hematologic: Comments: Mild platelet dysfunction    (+)       history of blood transfusion, no previous transfusion reaction,        Musculoskeletal:   (+)  arthritis,             GI/Hepatic:     (+)         appendicitis,    liver disease (mild fatty liver),    (-) GERD  "  Renal/Genitourinary:  - neg Renal ROS     Endo:  - neg endo ROS     Psychiatric/Substance Use:  - neg psychiatric ROS     Infectious Disease:  - neg infectious disease ROS     Malignancy:  - neg malignancy ROS     Other:  - neg other ROS          BP (!) 145/73 (BP Location: Right arm, Patient Position: Sitting, Cuff Size: Adult Regular)   Pulse 77   Temp 98.1  F (36.7  C) (Oral)   Resp 16   Ht 1.6 m (5' 3\")   Wt 69.9 kg (154 lb)   SpO2 98%   Breastfeeding No   BMI 27.28 kg/m      Physical Exam   Constitutional: Awake, alert, cooperative, no apparent distress, and appears stated age.  Eyes: Pupils equal, round and reactive to light, extra ocular muscles intact, sclera clear, conjunctiva normal.  HENT: Normocephalic, oral pharynx with moist mucus membranes, good dentition. No goiter appreciated.   Respiratory: Clear to auscultation bilaterally, no crackles or wheezing.  Cardiovascular: Regular rate and rhythm, normal S1 and S2, and no murmur noted.  Carotids +2, no bruits. No edema. Palpable pulses to radial  DP and PT arteries.   GI: Normal bowel sounds, soft, non-distended, non-tender, no masses palpated, no hepatosplenomegaly.    Lymph/Hematologic: No cervical lymphadenopathy and no supraclavicular lymphadenopathy.  Genitourinary:  defer  Skin: Warm and dry.  No rashes at anticipated surgical site.   Musculoskeletal: Full ROM of neck. There is no redness, warmth, or swelling of the joints. Gross motor strength is normal.    Neurologic: Awake, alert, oriented to name, place and time. Cranial nerves II-XII are grossly intact. Gait is normal.   Neuropsychiatric: Calm, cooperative. Normal affect.     Prior Labs/Diagnostic Studies   All labs and imaging pertinent to the visit personally reviewed    Latest Reference Range & Units 04/16/25 05:51   Sodium 135 - 145 mmol/L 136   Potassium 3.4 - 5.3 mmol/L 4.7   Chloride 98 - 107 mmol/L 103   Carbon Dioxide (CO2) 22 - 29 mmol/L 20 (L)   Urea Nitrogen 8.0 - 23.0 " mg/dL 10.9   Creatinine 0.51 - 0.95 mg/dL 0.87   GFR Estimate >60 mL/min/1.73m2 69   Calcium 8.8 - 10.4 mg/dL 9.3   Anion Gap 7 - 15 mmol/L 13   Magnesium 1.7 - 2.3 mg/dL 2.0   Phosphorus 2.5 - 4.5 mg/dL 2.5   Glucose 70 - 99 mg/dL 86   WBC 4.0 - 11.0 10e3/uL 15.8 (H)   Hemoglobin 11.7 - 15.7 g/dL 13.8   Hematocrit 35.0 - 47.0 % 42.9   Platelet Count 150 - 450 10e3/uL 290   RBC Count 3.80 - 5.20 10e6/uL 4.71   MCV 78 - 100 fL 91   MCH 26.5 - 33.0 pg 29.3   MCHC 31.5 - 36.5 g/dL 32.2   RDW 10.0 - 15.0 % 13.8   (L): Data is abnormally low  (H): Data is abnormally high    EKG/ stress test - if available please see in ROS above       The patient's records and results pertinent to the visit personally reviewed by this provider.     Outside records reviewed from: Care Everywhere    LAB/DIAGNOSTIC STUDIES TODAY:     Latest Reference Range & Units 06/16/25 08:03   WBC 4.0 - 11.0 10e3/uL 5.1   Hemoglobin 11.7 - 15.7 g/dL 14.9   Hematocrit 35.0 - 47.0 % 46.4   Platelet Count 150 - 450 10e3/uL 268   RBC Count 3.80 - 5.20 10e6/uL 5.17   MCV 78 - 100 fL 90   MCH 26.5 - 33.0 pg 28.8   MCHC 31.5 - 36.5 g/dL 32.1   RDW 10.0 - 15.0 % 13.6           Assessment    Maria Luisa LUNA Person is a 74 year old female seen as a PAC referral for risk assessment and optimization for anesthesia.    Plan/Recommendations  Pt will be optimized for the proposed procedure.  See below for details on the assessment, risk, and preoperative recommendations    NEUROLOGY  - No history of TIA, CVA or seizure  -Post Op delirium risk factors:  No risk identified    ENT  - No current airway concerns.  Will need to be reassessed day of surgery.  Mallampati: I  TM: > 3    CARDIAC  - No history of CAD, Hypertension, and Afib  ~ The patient's blood pressure is slightly elevated today at 145/73. She reports she does periodically monitor and it's normally in the 120/70 range. She denies any cardiac symptoms.   - METS (Metabolic Equivalents)  Patient performs 4 or more METS  "exercise without symptoms             Total Score: 0      RCRI-Very low risk: Class 1 0.4% complication rate             Total Score: 0        PULMONARY  - Obstructive Sleep Apnea  No current risk of obstructive sleep apnea   LASHONDA Low Risk             Total Score: 1    LASHONDA: Over 50 ys old      - Denies asthma or inhaler use  - Tobacco History    History   Smoking Status    Never   Smokeless Tobacco    Never       GI  - Appendicitis - procedure as above  ~ Mild fatty liver  PONV Medium Risk  Total Score: 2           1 AN PONV: Pt is Female    1 AN PONV: Patient is not a current smoker        /RENAL  - Baseline Creatinine  0.87    ENDOCRINE    - BMI: Estimated body mass index is 27.28 kg/m  as calculated from the following:    Height as of this encounter: 1.6 m (5' 3\").    Weight as of this encounter: 69.9 kg (154 lb).  Overweight (BMI 25.0-29.9)  - No history of Diabetes Mellitus    HEME  VTE Low Risk 0.26%             Total Score: 1    VTE: Greater than 59 yrs old      ~ Mild platelet dysfunction. The patient was seen by hematology while in the hospital who recommended the plan as below for her procedure. Type and screen ordered for the patient today and order for having 1 unit of platelets available for surgery ordered.     4/15/25  Assessment and Recommendation: -     # Mild platelet function defect-she has appendicitis, and may need surgery in the future, possibly hemicolectomy.  She does not have any bleeding issues with her usual activities.  So no intervention needs to be taken today, but only in the setting of surgery.  Previously, bleeding issues have been well-controlled by simple platelet transfusion immediately prior to procedure.  Given the nature of the surgery, I would also be inclined to give a dose of tranexamic acid preoperatively.  She would then need to be followed and if there is a significant drop in hemoglobin or overt bleeding, she can be given additional platelets and also placed on " "tranexamic acid 1 g IV every 8 hours.     Recommendations for surgical procedure:     -Transfuse 1 unit platelet immediately prior to surgery  -Tranexamic acid 1 gram IV immediately prior to surgery  - If evidence of significant bleeding post operatively, give additional 1 unit of platelets and start tranexamic acid 1 g IV every 8 hours     Tram Dotson MD  Hematolog      ** Received this message from the blood bank yesterday:   \"Hi this is An from Blood Bank. We received a Type and screen extension form for the patient's surgery on 6/20/25. However, the patient has answered \"Yes/Uncertain\" on one of the questions (Does the patient have history of Antibody or transfusion related complications?), therefore, making them ineligible for the extension. We will need a new specimen for Type and screen within 3 days of surgery. Please call Blood Bank at [phone], if you have any questions. Thank you.\"     The lab were the one who filled out and send in the blood bank form but as the blood bank said her type and screen cannot be extended and she was 4 days out from surgery. Our RN coordinator called her today and the patient was baffled by the information. We will have her repeat it today for surgery on 6/20/25. **    MSK  Patient is NOT Frail             Total Score: 0      ~ OA - s/p bilateral TKA  ~ S/p back surgery  ~ s/p carpal tunnel surgery  ~ S/p hammer toe surgery    ANESTHESIA  ~ The patient has remote history of misplaced epidural with labor causing respiratory and cardiac arrest. No other issues with surgery and anesthesia    Different anesthesia methods/types have been discussed with the patient, but they are aware that the final plan will be decided by the assigned anesthesia provider on the date of service.    The patient is optimized for their procedure. AVS with information on surgery time/arrival time, meds and NPO status given by nursing staff. No further diagnostic testing indicated.        21 " minutes were spent on the date of the encounter performing chart review, history and exam, documentation and/or discussion with other providers about the issues documented above.    Brigida Rivas PA-C  Preoperative Assessment Center  [facility]  Clinic and Surgery Center  Phone: [phone]  Fax: [phone]

## 2025-06-17 ENCOUNTER — LAB (OUTPATIENT)
Dept: LAB | Facility: CLINIC | Age: 75
End: 2025-06-17
Payer: COMMERCIAL

## 2025-06-17 DIAGNOSIS — K37 APPENDICITIS, UNSPECIFIED APPENDICITIS TYPE: ICD-10-CM

## 2025-06-17 DIAGNOSIS — Z01.818 PRE-OPERATIVE EXAMINATION: ICD-10-CM

## 2025-06-17 PROCEDURE — 36415 COLL VENOUS BLD VENIPUNCTURE: CPT

## 2025-06-17 PROCEDURE — 86900 BLOOD TYPING SEROLOGIC ABO: CPT

## 2025-06-20 ENCOUNTER — HOSPITAL ENCOUNTER (OUTPATIENT)
Facility: CLINIC | Age: 75
Setting detail: OBSERVATION
Discharge: HOME | End: 2025-06-20
Attending: SURGERY | Admitting: SURGERY
Payer: COMMERCIAL

## 2025-06-20 VITALS
HEIGHT: 63 IN | DIASTOLIC BLOOD PRESSURE: 78 MMHG | HEART RATE: 95 BPM | RESPIRATION RATE: 16 BRPM | TEMPERATURE: 98 F | SYSTOLIC BLOOD PRESSURE: 154 MMHG | OXYGEN SATURATION: 98 % | WEIGHT: 153 LBS | BODY MASS INDEX: 27.11 KG/M2

## 2025-06-20 DIAGNOSIS — K37 APPENDICITIS, UNSPECIFIED APPENDICITIS TYPE: Primary | ICD-10-CM

## 2025-06-20 DIAGNOSIS — Z98.890 POST-OPERATIVE STATE: ICD-10-CM

## 2025-06-20 LAB
BLD PROD TYP BPU: NORMAL
BLOOD COMPONENT TYPE: NORMAL
CODING SYSTEM: NORMAL
ISSUE DATE AND TIME: NORMAL
UNIT ABO/RH: NORMAL
UNIT NUMBER: NORMAL
UNIT STATUS: NORMAL
UNIT TYPE ISBT: 6200

## 2025-06-20 PROCEDURE — 88304 TISSUE EXAM BY PATHOLOGIST: CPT | Mod: TC | Performed by: SURGERY

## 2025-06-20 PROCEDURE — 44970 LAPAROSCOPY APPENDECTOMY: CPT | Mod: GC | Performed by: SURGERY

## 2025-06-20 PROCEDURE — P9037 PLATE PHERES LEUKOREDU IRRAD: HCPCS | Performed by: ANESTHESIOLOGY

## 2025-06-20 PROCEDURE — 360N000076 HC SURGERY LEVEL 3, PER MIN: Performed by: SURGERY

## 2025-06-20 PROCEDURE — G0378 HOSPITAL OBSERVATION PER HR: HCPCS

## 2025-06-20 PROCEDURE — 250N000011 HC RX IP 250 OP 636: Performed by: ANESTHESIOLOGY

## 2025-06-20 PROCEDURE — 272N000001 HC OR GENERAL SUPPLY STERILE: Performed by: SURGERY

## 2025-06-20 PROCEDURE — 250N000011 HC RX IP 250 OP 636: Performed by: SURGERY

## 2025-06-20 PROCEDURE — 250N000009 HC RX 250: Performed by: ANESTHESIOLOGY

## 2025-06-20 PROCEDURE — 250N000025 HC SEVOFLURANE, PER MIN: Performed by: SURGERY

## 2025-06-20 PROCEDURE — 370N000017 HC ANESTHESIA TECHNICAL FEE, PER MIN: Performed by: SURGERY

## 2025-06-20 PROCEDURE — 999N000141 HC STATISTIC PRE-PROCEDURE NURSING ASSESSMENT: Performed by: SURGERY

## 2025-06-20 PROCEDURE — 250N000013 HC RX MED GY IP 250 OP 250 PS 637: Performed by: ANESTHESIOLOGY

## 2025-06-20 PROCEDURE — 88304 TISSUE EXAM BY PATHOLOGIST: CPT | Mod: 26 | Performed by: STUDENT IN AN ORGANIZED HEALTH CARE EDUCATION/TRAINING PROGRAM

## 2025-06-20 PROCEDURE — 250N000009 HC RX 250

## 2025-06-20 PROCEDURE — 250N000013 HC RX MED GY IP 250 OP 250 PS 637: Performed by: STUDENT IN AN ORGANIZED HEALTH CARE EDUCATION/TRAINING PROGRAM

## 2025-06-20 PROCEDURE — 710N000010 HC RECOVERY PHASE 1, LEVEL 2, PER MIN: Performed by: SURGERY

## 2025-06-20 PROCEDURE — 250N000013 HC RX MED GY IP 250 OP 250 PS 637: Performed by: SURGERY

## 2025-06-20 RX ORDER — FENTANYL CITRATE 50 UG/ML
25 INJECTION, SOLUTION INTRAMUSCULAR; INTRAVENOUS EVERY 5 MIN PRN
Refills: 0 | Status: DISCONTINUED | OUTPATIENT
Start: 2025-06-20 | End: 2025-06-20 | Stop reason: HOSPADM

## 2025-06-20 RX ORDER — BUPIVACAINE HYDROCHLORIDE 2.5 MG/ML
INJECTION, SOLUTION INFILTRATION; PERINEURAL PRN
Status: DISCONTINUED | OUTPATIENT
Start: 2025-06-20 | End: 2025-06-20 | Stop reason: HOSPADM

## 2025-06-20 RX ORDER — HYDROMORPHONE HCL IN WATER/PF 6 MG/30 ML
0.4 PATIENT CONTROLLED ANALGESIA SYRINGE INTRAVENOUS EVERY 5 MIN PRN
Refills: 0 | Status: DISCONTINUED | OUTPATIENT
Start: 2025-06-20 | End: 2025-06-20 | Stop reason: HOSPADM

## 2025-06-20 RX ORDER — CEFAZOLIN SODIUM/WATER 2 G/20 ML
2 SYRINGE (ML) INTRAVENOUS
Status: COMPLETED | OUTPATIENT
Start: 2025-06-20 | End: 2025-06-20

## 2025-06-20 RX ORDER — NALOXONE HYDROCHLORIDE 0.4 MG/ML
0.2 INJECTION, SOLUTION INTRAMUSCULAR; INTRAVENOUS; SUBCUTANEOUS
Status: DISCONTINUED | OUTPATIENT
Start: 2025-06-20 | End: 2025-06-20 | Stop reason: HOSPADM

## 2025-06-20 RX ORDER — AMOXICILLIN 250 MG
1-2 CAPSULE ORAL 2 TIMES DAILY
Qty: 30 TABLET | Refills: 0 | Status: SHIPPED | OUTPATIENT
Start: 2025-06-20

## 2025-06-20 RX ORDER — ACETAMINOPHEN 325 MG/1
650 TABLET ORAL EVERY 4 HOURS PRN
Qty: 30 TABLET | Refills: 0 | Status: SHIPPED | OUTPATIENT
Start: 2025-06-20

## 2025-06-20 RX ORDER — LIDOCAINE 40 MG/G
CREAM TOPICAL
Status: DISCONTINUED | OUTPATIENT
Start: 2025-06-20 | End: 2025-06-20 | Stop reason: HOSPADM

## 2025-06-20 RX ORDER — FENTANYL CITRATE 50 UG/ML
50 INJECTION, SOLUTION INTRAMUSCULAR; INTRAVENOUS EVERY 5 MIN PRN
Refills: 0 | Status: DISCONTINUED | OUTPATIENT
Start: 2025-06-20 | End: 2025-06-20 | Stop reason: HOSPADM

## 2025-06-20 RX ORDER — ONDANSETRON 4 MG/1
4 TABLET, ORALLY DISINTEGRATING ORAL EVERY 6 HOURS PRN
Status: DISCONTINUED | OUTPATIENT
Start: 2025-06-20 | End: 2025-06-20 | Stop reason: HOSPADM

## 2025-06-20 RX ORDER — TRANEXAMIC ACID 10 MG/ML
1 INJECTION, SOLUTION INTRAVENOUS ONCE
Status: COMPLETED | OUTPATIENT
Start: 2025-06-20 | End: 2025-06-20

## 2025-06-20 RX ORDER — HYDROMORPHONE HCL IN WATER/PF 6 MG/30 ML
0.2 PATIENT CONTROLLED ANALGESIA SYRINGE INTRAVENOUS EVERY 5 MIN PRN
Refills: 0 | Status: DISCONTINUED | OUTPATIENT
Start: 2025-06-20 | End: 2025-06-20 | Stop reason: HOSPADM

## 2025-06-20 RX ORDER — ONDANSETRON 2 MG/ML
4 INJECTION INTRAMUSCULAR; INTRAVENOUS EVERY 6 HOURS PRN
Status: DISCONTINUED | OUTPATIENT
Start: 2025-06-20 | End: 2025-06-20 | Stop reason: HOSPADM

## 2025-06-20 RX ORDER — DIPHENHYDRAMINE HYDROCHLORIDE 50 MG/ML
50 INJECTION, SOLUTION INTRAMUSCULAR; INTRAVENOUS ONCE
Status: DISCONTINUED | OUTPATIENT
Start: 2025-06-20 | End: 2025-06-20 | Stop reason: HOSPADM

## 2025-06-20 RX ORDER — NALOXONE HYDROCHLORIDE 0.4 MG/ML
0.4 INJECTION, SOLUTION INTRAMUSCULAR; INTRAVENOUS; SUBCUTANEOUS
Status: DISCONTINUED | OUTPATIENT
Start: 2025-06-20 | End: 2025-06-20 | Stop reason: HOSPADM

## 2025-06-20 RX ORDER — CEFAZOLIN SODIUM/WATER 2 G/20 ML
2 SYRINGE (ML) INTRAVENOUS SEE ADMIN INSTRUCTIONS
Status: DISCONTINUED | OUTPATIENT
Start: 2025-06-20 | End: 2025-06-20 | Stop reason: HOSPADM

## 2025-06-20 RX ORDER — ONDANSETRON 2 MG/ML
4 INJECTION INTRAMUSCULAR; INTRAVENOUS EVERY 30 MIN PRN
Status: DISCONTINUED | OUTPATIENT
Start: 2025-06-20 | End: 2025-06-20 | Stop reason: HOSPADM

## 2025-06-20 RX ORDER — ACETAMINOPHEN 325 MG/1
975 TABLET ORAL EVERY 8 HOURS
Status: DISCONTINUED | OUTPATIENT
Start: 2025-06-20 | End: 2025-06-20 | Stop reason: HOSPADM

## 2025-06-20 RX ORDER — ONDANSETRON 4 MG/1
4 TABLET, ORALLY DISINTEGRATING ORAL EVERY 30 MIN PRN
Status: DISCONTINUED | OUTPATIENT
Start: 2025-06-20 | End: 2025-06-20 | Stop reason: HOSPADM

## 2025-06-20 RX ORDER — MAGNESIUM HYDROXIDE/ALUMINUM HYDROXICE/SIMETHICONE 120; 1200; 1200 MG/30ML; MG/30ML; MG/30ML
30 SUSPENSION ORAL EVERY 4 HOURS PRN
Status: DISCONTINUED | OUTPATIENT
Start: 2025-06-20 | End: 2025-06-20 | Stop reason: HOSPADM

## 2025-06-20 RX ORDER — HYDROXYZINE HYDROCHLORIDE 10 MG/1
10 TABLET, FILM COATED ORAL EVERY 6 HOURS PRN
Status: DISCONTINUED | OUTPATIENT
Start: 2025-06-20 | End: 2025-06-20 | Stop reason: HOSPADM

## 2025-06-20 RX ORDER — SODIUM CHLORIDE, SODIUM LACTATE, POTASSIUM CHLORIDE, CALCIUM CHLORIDE 600; 310; 30; 20 MG/100ML; MG/100ML; MG/100ML; MG/100ML
INJECTION, SOLUTION INTRAVENOUS CONTINUOUS
Status: DISCONTINUED | OUTPATIENT
Start: 2025-06-20 | End: 2025-06-20 | Stop reason: HOSPADM

## 2025-06-20 RX ORDER — IBUPROFEN 200 MG
600 TABLET ORAL EVERY 6 HOURS PRN
Status: DISCONTINUED | OUTPATIENT
Start: 2025-06-20 | End: 2025-06-20 | Stop reason: HOSPADM

## 2025-06-20 RX ORDER — OXYCODONE HYDROCHLORIDE 5 MG/1
5 TABLET ORAL EVERY 6 HOURS PRN
Qty: 12 TABLET | Refills: 0 | Status: SHIPPED | OUTPATIENT
Start: 2025-06-20 | End: 2025-06-23

## 2025-06-20 RX ORDER — NALOXONE HYDROCHLORIDE 0.4 MG/ML
0.1 INJECTION, SOLUTION INTRAMUSCULAR; INTRAVENOUS; SUBCUTANEOUS
Status: DISCONTINUED | OUTPATIENT
Start: 2025-06-20 | End: 2025-06-20 | Stop reason: HOSPADM

## 2025-06-20 RX ORDER — ACETAMINOPHEN 325 MG/1
650 TABLET ORAL ONCE
Status: COMPLETED | OUTPATIENT
Start: 2025-06-20 | End: 2025-06-20

## 2025-06-20 RX ORDER — OXYCODONE HYDROCHLORIDE 5 MG/1
5 TABLET ORAL EVERY 4 HOURS PRN
Status: DISCONTINUED | OUTPATIENT
Start: 2025-06-20 | End: 2025-06-20 | Stop reason: HOSPADM

## 2025-06-20 RX ORDER — TRANEXAMIC ACID 10 MG/ML
0.12 INJECTION, SOLUTION INTRAVENOUS CONTINUOUS
Status: DISCONTINUED | OUTPATIENT
Start: 2025-06-20 | End: 2025-06-20 | Stop reason: HOSPADM

## 2025-06-20 RX ORDER — METRONIDAZOLE 500 MG/100ML
500 INJECTION, SOLUTION INTRAVENOUS
Status: COMPLETED | OUTPATIENT
Start: 2025-06-20 | End: 2025-06-20

## 2025-06-20 RX ORDER — MAGNESIUM OXIDE 400 MG/1
400 TABLET ORAL EVERY MORNING
Status: DISCONTINUED | OUTPATIENT
Start: 2025-06-21 | End: 2025-06-20 | Stop reason: HOSPADM

## 2025-06-20 RX ORDER — IBUPROFEN 600 MG/1
600 TABLET, FILM COATED ORAL EVERY 6 HOURS PRN
Qty: 30 TABLET | Refills: 0 | Status: SHIPPED | OUTPATIENT
Start: 2025-06-20

## 2025-06-20 RX ORDER — OXYCODONE HYDROCHLORIDE 5 MG/1
5 TABLET ORAL EVERY 4 HOURS PRN
Refills: 0 | Status: DISCONTINUED | OUTPATIENT
Start: 2025-06-20 | End: 2025-06-20 | Stop reason: HOSPADM

## 2025-06-20 RX ORDER — DEXAMETHASONE SODIUM PHOSPHATE 4 MG/ML
4 INJECTION, SOLUTION INTRA-ARTICULAR; INTRALESIONAL; INTRAMUSCULAR; INTRAVENOUS; SOFT TISSUE
Status: DISCONTINUED | OUTPATIENT
Start: 2025-06-20 | End: 2025-06-20 | Stop reason: HOSPADM

## 2025-06-20 RX ADMIN — OXYCODONE HYDROCHLORIDE 5 MG: 5 TABLET ORAL at 16:30

## 2025-06-20 RX ADMIN — ACETAMINOPHEN 650 MG: 325 TABLET ORAL at 06:33

## 2025-06-20 RX ADMIN — TRANEXAMIC ACID 1 G: 10 INJECTION, SOLUTION INTRAVENOUS at 07:22

## 2025-06-20 RX ADMIN — Medication 1 LOZENGE: at 10:08

## 2025-06-20 RX ADMIN — METRONIDAZOLE 500 MG: 500 INJECTION, SOLUTION INTRAVENOUS at 06:33

## 2025-06-20 RX ADMIN — TRANEXAMIC ACID 0.12 G/HR: 10 INJECTION, SOLUTION INTRAVENOUS at 10:25

## 2025-06-20 ASSESSMENT — ACTIVITIES OF DAILY LIVING (ADL)
ADLS_ACUITY_SCORE: 48
ADLS_ACUITY_SCORE: 46
ADLS_ACUITY_SCORE: 48
ADLS_ACUITY_SCORE: 46
ADLS_ACUITY_SCORE: 48

## 2025-06-20 NOTE — BRIEF OP NOTE
[facility]    Brief Operative Note    Pre-operative diagnosis: Appendicitis [K37]  Post-operative diagnosis Same as pre-operative diagnosis    Procedure: APPENDECTOMY, LAPAROSCOPIC, N/A - Abdomen    Surgeon: Surgeons and Role:     * Emilio Chapa MD - Primary     * Laura Bishop MD - Resident - Assisting  Anesthesia: General   Estimated Blood Loss: 5 cc    Drains: None  Specimens:   ID Type Source Tests Collected by Time Destination   1 : Appendix Tissue Appendix SURGICAL PATHOLOGY EXAM Emilio Chapa MD 6/20/2025  8:36 AM      Findings:   Interval appendectomy performed. Appendix located in mid right hemiabdomen. Adhesions noted superiorly to location of appendix, partially taken down to visualize cecal base. Noted healthy cecal base. Appendix taken with endoloops x2. No significant bleeding or leakage. Incisions closed with dermabond..  Complications: None.  Implants: * No implants in log *    To PACU, extubated in OR without event  Plan for observation admission, home in AM  Additional dose TXA in perioperative area  Oxycodone for home

## 2025-06-20 NOTE — DISCHARGE SUMMARY
[facility]  Discharge Summary  General Surgery     Maria Luisa Garcia MRN# [id number]   YOB: 1950 Age: 74 year old     Date of Admission:  6/20/2025  Date of Discharge::  6/21/2025  Admitting Physician:  Emilio Chapa MD  Discharge Physician:  Emilio Chapa MD  Primary Care Physician:        No Ref-Primary, Physician          Admission Diagnoses:   Appendicitis [K37]          Discharge Diagnosis:   There are no discharge diagnoses documented for the most recent discharge.          Procedures:   Procedure(s):  APPENDECTOMY, LAPAROSCOPIC          Consultations:   None          Brief History of Illness:   74-year-old woman with past medical history significant for history of platelet dysfunction, history of respiratory cardiac arrest following displaced epidural, fatty liver, osteoarthritis and appendicitis. She was treated in the hospital with antibiotics on 4/14/2025 and now presents for the procedure above.            Hospital Course:   The patient underwent laparoscopic appendectomy on June 20, 2025, which she tolerated well without immediate complications. She was transferred to the PACU and then floor for routine postoperative care. The remainder of her postoperative course was unremarkable. In PACU, her diet was slowly advanced. On the day of discharge, she was tolerating a diet, her pain was well controlled with oral pain medications, she was voiding spontaneously, and ambulating independently.            Imaging Studies:     Results for orders placed or performed during the hospital encounter of 04/14/25   CT Abdomen Pelvis w Contrast    Narrative    EXAM: CT ABDOMEN PELVIS W CONTRAST  LOCATION: [facility]  DATE: 4/14/2025    INDICATION: RLQ pain  COMPARISON: None.  TECHNIQUE: CT scan of the abdomen and pelvis was performed following injection of IV contrast. Multiplanar reformats were obtained. Dose reduction techniques  were used.  CONTRAST: 78 mL Isovue 370    FINDINGS:   LOWER CHEST: Calcified granuloma right middle lobe.    HEPATOBILIARY: Mild diffuse fatty infiltration of the liver. Calcified hepatic granuloma. Gallstones.    PANCREAS: Small focus of fat at the junction of the pancreatic head and body. No inflammatory changes.    SPLEEN: Normal.    ADRENAL GLANDS: Normal.    KIDNEYS/BLADDER: 1 mm nonobstructing stone right kidney and tiny right renal cyst. Normal left kidney.    BOWEL: Dilated appendix in the right lower quadrant measuring 11 mm, with 8 mm appendicolith at the appendiceal base. Two additional appendicoliths towards the tip of the appendix. Edematous in the adjacent mesentery and marked soft tissue thickening at   the base of the cecum and ileocecal valve. No evidence for perforation or abscess. Several mildly dilated loops of fluid-filled small bowel mid and distal ileum.    LYMPH NODES: Normal.    VASCULATURE: Normal.    PELVIC ORGANS: Small amount of free pelvic fluid.    MUSCULOSKELETAL: Degenerative disc and facet disease lumbar spine.      Impression    IMPRESSION:   1.  Acute appendicitis.  2.  Marked soft tissue thickening at the base of the cecum and ileocecal valve, with dilatation of the distal small bowel which could represent a developing ileus or obstruction. Surgical consultation recommended.              Medications Prior to Admission:     Medications Prior to Admission   Medication Sig Dispense Refill Last Dose/Taking    acetaminophen (TYLENOL) 325 MG tablet Take 2 tablets (650 mg) by mouth continuous prn for other (Give dose prior to any platelet infusion) 60 tablet 0 Past Month    calcium carbonate (OS-MARI) 600 MG tablet Take 600 mg by mouth 2 times daily (with meals)   6/15/2025    cholecalciferol (VITAMIN D3) 125 mcg (5000 units) capsule Take 250 mcg by mouth every morning. Winter time dose       Coenzyme Q10 300 MG CAPS Take 300 mg by mouth every morning.   6/15/2025     Glucosamine-Chondroit-Vit C-Mn (GLUCOSAMINE CHONDR 1500 COMPLX PO) Take 2 tablets by mouth every morning Brand name: Bi Flex   6/15/2025    magnesium oxide (MAG-OX) 400 (240 Mg) MG tablet Take 400 mg by mouth every morning.   6/15/2025    Multiple Vitamins-Minerals (PRESERVISION AREDS 2 PO) Take 1 tablet by mouth every morning.   Unknown    Probiotic Product (PROBIOTIC BLEND PO) Take 1 tablet by mouth every morning.   6/15/2025    Turmeric 500 MG TABS Take 500 mg by mouth every morning.   6/15/2025              Discharge Medications:     Current Discharge Medication List        START taking these medications    Details   !! acetaminophen (TYLENOL) 325 MG tablet Take 2 tablets (650 mg) by mouth every 4 hours as needed for other (mild pain).  Qty: 30 tablet, Refills: 0    Associated Diagnoses: Appendicitis, unspecified appendicitis type; Post-operative state      ibuprofen (ADVIL/MOTRIN) 600 MG tablet Take 1 tablet (600 mg) by mouth every 6 hours as needed for other (For mild pain or temperature greater than 102F).  Qty: 30 tablet, Refills: 0    Associated Diagnoses: Appendicitis, unspecified appendicitis type; Post-operative state      senna-docusate (SENOKOT-S/PERICOLACE) 8.6-50 MG tablet Take 1-2 tablets by mouth 2 times daily.  Qty: 30 tablet, Refills: 0    Comments: While on narcotics  Associated Diagnoses: Appendicitis, unspecified appendicitis type; Post-operative state       !! - Potential duplicate medications found. Please discuss with provider.        CONTINUE these medications which have NOT CHANGED    Details   !! acetaminophen (TYLENOL) 325 MG tablet Take 2 tablets (650 mg) by mouth continuous prn for other (Give dose prior to any platelet infusion)  Qty: 60 tablet, Refills: 0    Associated Diagnoses: Tricompartment osteoarthritis of both knees      calcium carbonate (OS-MARI) 600 MG tablet Take 600 mg by mouth 2 times daily (with meals)      cholecalciferol (VITAMIN D3) 125 mcg (5000 units) capsule Take  250 mcg by mouth every morning. Winter time dose      Coenzyme Q10 300 MG CAPS Take 300 mg by mouth every morning.      Glucosamine-Chondroit-Vit C-Mn (GLUCOSAMINE CHONDR 1500 COMPLX PO) Take 2 tablets by mouth every morning Brand name: Bi Flex      magnesium oxide (MAG-OX) 400 (240 Mg) MG tablet Take 400 mg by mouth every morning.      Multiple Vitamins-Minerals (PRESERVISION AREDS 2 PO) Take 1 tablet by mouth every morning.      Probiotic Product (PROBIOTIC BLEND PO) Take 1 tablet by mouth every morning.      Turmeric 500 MG TABS Take 500 mg by mouth every morning.       !! - Potential duplicate medications found. Please discuss with provider.                  Medications Discontinued or Adjusted During This Hospitalization:   Oxycodone prescribed           Antibiotics Prescribed at Discharge:   None prescribed           Day of Discharge Physical Exam:   Temp:  [98  F (36.7  C)-98.3  F (36.8  C)] 98  F (36.7  C)  Pulse:  [80] 80  Resp:  [16] 16  BP: (155-173)/(65-82) 155/65  SpO2:  [99 %] 99 %    General: awake, alert, no acute distress, laying comfortably in bed   CV: warm, well perfused   Pulm: breathing comfortably on room air   Abdomen: soft, non-distended, appropriately tender, no rebound or guarding;  Incision CDI closed with dermabond   Extremities: no edema, moving all extremities spontaneously and without apparent deficit            Final Pathology Result:   Pending at time of discharge           Discharge Instructions and Follow-Up:   No discharge procedures on file.           Home Health Care:     Not needed           Discharge Disposition:     Discharged to home      Condition at discharge: Stable    Patient was seen, examined, and discussed on day of discharge with chief resident, who discussed with staff surgeon.    Erin Jurado MD   General Surgery PGY-1   [facility]

## 2025-06-20 NOTE — OP NOTE
[facility]    Operative Note      Pre-operative diagnosis: Appendicitis [K37]   Post-operative diagnosis * No post-op diagnosis entered *   Procedure: Procedure(s):  APPENDECTOMY, LAPAROSCOPIC   Surgeon: Surgeons and Role:     * Emilio Chapa MD - Primary     * Laura Bishop MD - Resident - Assisting   Anesthesia: General    Estimated blood loss: 5ml   Drains: None   Specimens: ID Type Source Tests Collected by Time Destination   1 : Appendix Tissue Appendix SURGICAL PATHOLOGY EXAM Emilio Chapa MD 6/20/2025  8:36 AM       Findings: Old soft adhesions along the appendix from the omentum.   The appendix was not acutely inflamed.  It was not perforated.  There was no fluid near the appendix.   Complications: None.   Implants: None.         OPERATIVE INDICATIONS:  Maria Luisa Garcia is a 74 year old-year-old female with a history of acute appendicitis managed with ABX. After understanding the risks and benefits of proceeding with surgery, the patient has an indication for an interval laparoscopic appendectomy and consented to undergo surgery.  With the help of her hematology team we gave her platelet transfusion and TXA infusion prior to surgery.    OPERATIVE DETAILS:  The patient was brought to the operating room, intubated and prepared in a routine fashion.  ABX had been given.  A timeout was performed prior to surgery and documented by the nursing team.    Under the benefits of general anesthesia, a left upper quadrant Veress needle was inserted and pneumoperitoneum was established using carbon dioxide gas to a maximum pressure of 15 mmHg.  A total of 3 five mm ports were placed and the 5 mm laparoscope was utilized. The cecum and appendix were identified at the confluence of the tenia coli. The appendix was not acutely inflamed and was not perforated.  Soft adhesions to the appendix from the omentum were bluntly dissected easily. A a ligasure energy  device was then used to control and divide the mesoappendix down to the base- the base appeared healthy.  The base was controlled with PDS endoloops x2 and was then was divided distal to these with the ligasure after firing it 3 times. The appendix was placed into an endocatch bag and removed from the LUQ port site. Hemostasis was confirmed. The appendiceal stump was hemostatic and intact. A TAP block was performed. All skin incisions were closed using 4-0 monocryl suture and skin glue was applied.    All needle and sponge counts were correct x2 at the end of the procedure.    I performed the procedure.

## 2025-06-20 NOTE — DISCHARGE INSTRUCTIONS
Appendectomy: What to Expect at Home  Your Recovery     Your doctor removed your appendix either by making many small cuts, called incisions, in your belly (laparoscopic surgery) or through open surgery. In open surgery, the doctor makes one large incision. The incisions leave scars that usually fade over time.  After your surgery, it is normal to feel weak and tired for several days after you return home. Your belly may be swollen and may be painful. If you had laparoscopic surgery, you may have shoulder pain. This is caused by the air the doctor put in your belly to help see the organs better. The pain may last for a day or two.  You may also have nausea or vomiting, diarrhea, constipation, gas, or a headache. These problems usually go away in a few days.  Your recovery time depends on the type of surgery you had. If you had laparoscopic surgery, you will probably be able to return to work or a normal routine in a couple of weeks after surgery. If you had an open surgery, it may take longer. If your appendix ruptured, you may have a drain in your incision.  Your body will work fine without an appendix. You won't have to make any changes in your diet or lifestyle.  This care sheet gives you a general idea about how long it will take for you to recover. But each person recovers at a different pace. Follow the steps below to get better as quickly as possible.  How can you care for yourself at home?  Activity    Rest when you feel tired. Getting enough sleep will help you recover.     Try to walk each day. Start by walking a little more than you did the day before. Bit by bit, increase the amount you walk. Walking boosts blood flow and helps prevent pneumonia and constipation.     For about 2 weeks, avoid lifting anything that would make you strain. This may include a child, heavy grocery bags and milk containers, a heavy briefcase or backpack, cat litter or dog food bags, or a vacuum .     Avoid strenuous  activities, such as bicycle riding, jogging, weight lifting, or aerobic exercise. Do this for about 2 weeks or until your doctor says it is okay.     You may take showers  24 hours after surgery. Pat the incision dry. Do not take a bath for the first 2 weeks.     You may drive when you are no longer taking pain medicine and can quickly move your foot from the gas pedal to the brake. You must also be able to sit comfortably for a long period of time, even if you do not plan on going far. You might get caught in traffic.     You will probably be able to go back to work in 1 to 2 weeks. If you had an open surgery, it may take longer.        Diet    You can eat your normal diet. If your stomach is upset, start with small amounts of food.     Drink plenty of fluids (unless your doctor tells you not to).     You may notice that your bowel movements are not regular right after your surgery. This is common. Try to avoid constipation and straining with bowel movements. You may want to take a fiber supplement every day. If you have not had a bowel movement after a couple of days, ask your doctor about taking a mild laxative.   Medicines    You can restart your medicines. You also will be given instructions about taking any new medicines.     If you stopped taking aspirin or some other blood thinner, your doctor will tell you when to start taking it again.         Be safe with medicines. Take pain medicines exactly as directed.  If the doctor gave you a prescription medicine for pain, take it as prescribed.  If you are not taking a prescription pain medicine, take an over-the-counter medicine such as acetaminophen (Tylenol), ibuprofen (Advil, Motrin), or naproxen (Aleve). Read and follow all instructions on the label.  Do not take two or more pain medicines at the same time unless the doctor told you to. Many pain medicines have acetaminophen, which is Tylenol. Too much Tylenol can be harmful.     If you think your pain  medicine is making you sick to your stomach:  Take your medicine after meals (unless your doctor has told you not to).  Ask your doctor for a different pain medicine.   Incision care        If you have strips of tape or glue on the incision, leave on until it falls off.     Gently wash the area with warm, soapy water 24 to 48 hours after your surgery, unless your doctor tells you not to. Pat the area dry.     Keep the area clean and dry. You may cover it with a gauze bandage if it oozes or rubs against clothing. Change the bandage every day or more often if needed.     You may notice yellow or grayish fluid oozing from your wound as you start to heal. This is normal. It is a sign that your wound is healing.     Follow-up care is a key part of your treatment and safety. Be sure to make and go to all appointments, and call your doctor if you are having problems. It's also a good idea to know your test results and keep a list of the medicines you take.  When should you call for help?   Call 911 anytime you think you may need emergency care. For example, call if:    You passed out (lost consciousness).     You are short of breath.   Call your doctor now or seek immediate medical care if:    You have nausea or vomiting and cannot drink fluids.     You cannot pass stools or gas.     You have pain that does not get better when you take your pain medicine.     You have signs of infection, such as:  Increased pain, swelling, warmth, or redness.  Red streaks leading from the wound.  Pus draining from the wound.  A fever.     You have loose stitches, or your incision comes open.     Bright red blood has soaked through the bandage over your incision.     You have signs of a blood clot in your leg (called a deep vein thrombosis), such as:  Pain in your calf, back of knee, thigh, or groin.  Redness and swelling in your leg or groin.   Watch closely for any changes in your health, and be sure to contact your doctor if you have any  "problems.  Where can you learn more?  Go to https://www.[employer].net/patiented  Enter X801 in the search box to learn more about \"Appendectomy: What to Expect at Home.\"  Current as of: July 31, 2024  Content Version: 14.5 2024-2025 [employer].   Care instructions adapted under license by your healthcare professional. If you have questions about a medical condition or this instruction, always ask your healthcare professional. [employer] disclaims any warranty or liability for your use of this information.    "

## 2025-06-21 NOTE — PLAN OF CARE
Goal Outcome Evaluation:  .DISCHARGE                         6/20/2025  6:56 PM  ----------------------------------------------------------------------------  Discharged to: Home  Via: Automobile  Accompanied by: Family  Discharge Instructions: diet, activity, medications, follow up appointments, when to call the MD, aftercare instructions, and what to watchout for (i.e. s/s of infection, increasing SOB, palpitations, chest pain,)  Prescriptions: To be filled by discharge pharmacy per pt's request; medication list reviewed & sent with pt  Follow Up Appointments: arranged; information given  Belongings: All sent with pt  IV: out  Telemetry: off  Pt exhibits understanding of above discharge instructions; all questions answered.    Discharge Paperwork: copied, and sent home with patient.

## 2025-06-23 ENCOUNTER — PATIENT OUTREACH (OUTPATIENT)
Dept: SURGERY | Facility: CLINIC | Age: 75
End: 2025-06-23
Payer: COMMERCIAL

## 2025-06-23 NOTE — PROGRESS NOTES
RN Post-Op/Post-Discharge Care Coordination Note    Ms. Maria Luisa Garcia is a 74 year old female who underwent laparoscopic appendectomy on 6/20 with  Dr. Emilio Chapa.  Spoke with Patient.    Support  Patient able to care for self independently     Health Status  Nausea/Vomiting: Patient denies nausea/vomiting.  Eating/drinking: Patient is able to eat and drink without any complaints.  Diet:  Regular  Bowel habits: Patient reports having a normal bowel movement.  Drains (HUMBERTO): N/A  Fevers/chills: Patient denies any fever or chills.  Incisions: Patient denies any signs and symptoms of infection..  Wound closure:  Skin Sealant  Pain: None  New Medications:  Tylenol, Ibuprofen, Senna, Oxycodone    Activity/Restrictions  Discussed importance of early mobility after surgery. Suggested getting up and walking and at a minimum walking room to room every hour.    Adherence of the following restrictions:   No lifting in excess of 15-20 pounds for 3-4 weeks    Equipment  None    Pathology reviewed with patient:  No, not yet available    Forms/Letters  No    All of her questions were answered including reviewing restrictions and wound care.  She will call this office if she has any further questions and/or concerns.      In lieu of a post-op clinic patient that patient would like to be contacted in approximately 7-10 days via telephone.    A copy of this note was routed to the primary surgeon.      Whom and When to Call  Patient acknowledges understanding of how to manage any medication changes and   when to seek medical care.     Patient advised that if after hour medical concerns arise to please call [phone] and choose option 4 to speak to the physician on call.

## 2025-06-26 LAB
PATH REPORT.COMMENTS IMP SPEC: NORMAL
PATH REPORT.COMMENTS IMP SPEC: NORMAL
PATH REPORT.FINAL DX SPEC: NORMAL
PATH REPORT.GROSS SPEC: NORMAL
PATH REPORT.MICROSCOPIC SPEC OTHER STN: NORMAL
PATH REPORT.RELEVANT HX SPEC: NORMAL
PHOTO IMAGE: NORMAL

## 2025-07-01 ENCOUNTER — PATIENT OUTREACH (OUTPATIENT)
Dept: SURGERY | Facility: CLINIC | Age: 75
End: 2025-07-01
Payer: COMMERCIAL

## 2025-07-01 NOTE — PROGRESS NOTES
Maria Luisa Garcia was contacted several days post procedure via telephone for a status update and elected to have a telephone follow -up call approximately 7-10 days after original contact in lieu of a clinic visit with Dr. Emilio Chapa.  She continues to do well and the steri-strips  has peeled off.  The patients wounds are healed and the area is C/D/I.  She is afebrile, pain free, and kandy po; the patient is slowly resuming her normal activity.   Discussed restrictions including no lifting in excess of 15-20 pounds for 3 weeks.    Pathology was reviewed with the patient: Yes    All of Maria Luisa Garcia question's were answered and  she would like to return to the clinic on a PRN basis.  The patient is aware that  she may schedule a post op appointment at any time.    A copy of this note was routed to the patients surgeon.      Pathology:  Case Report   Surgical Pathology Report                         Case: [id number]                                   Authorizing Provider:  Emilio Chapa MD   Collected:           06/20/2025 08:36 AM           Ordering Location:     [facility] OR                 Received:            06/20/2025 09:29 AM           Pathologist:           Hodan Yoder MD                                                           Specimen:    Appendix                                                                                  Final Diagnosis   A. APPENDIX, LAPAROSCOPIC APPENDECTOMY:  - Appendix without diagnostic abnormality.   - Negative for dysplasia or malignancy.

## 2025-08-16 ENCOUNTER — HEALTH MAINTENANCE LETTER (OUTPATIENT)
Age: 75
End: 2025-08-16

## 2025-08-27 ENCOUNTER — HOSPITAL ENCOUNTER (OUTPATIENT)
Dept: MRI IMAGING | Facility: HOSPITAL | Age: 75
Discharge: HOME | End: 2025-08-27
Attending: ORTHOPAEDIC SURGERY
Payer: COMMERCIAL

## 2025-08-27 DIAGNOSIS — M25.512 ACUTE PAIN OF LEFT SHOULDER: ICD-10-CM

## 2025-08-27 PROCEDURE — 73221 MRI JOINT UPR EXTREM W/O DYE: CPT | Mod: LT

## (undated) DEVICE — ESU LIGASURE LAPAROSCOPIC BLUNT TIP SEALER 5MMX37CM LF1837

## (undated) DEVICE — SUCTION MANIFOLD NEPTUNE 2 SYS 4 PORT 0702-020-000

## (undated) DEVICE — LINEN BACK PACK 5440

## (undated) DEVICE — DECANTER TRANSFER DEVICE 2008S

## (undated) DEVICE — GLOVE PROTEXIS BLUE W/NEU-THERA 8.0  2D73EB80

## (undated) DEVICE — SUCTION MANIFOLD DORNOCH ULTRA CART UL-CL500

## (undated) DEVICE — ADH SKIN CLOSURE PREMIERPRO EXOFIN 1.0ML 3470

## (undated) DEVICE — LINEN GOWN X4 5410

## (undated) DEVICE — DRSG TEGADERM ALGINATE AG 4X5" 90303

## (undated) DEVICE — SU PDS II 0 ENDOLOOP EZ10G

## (undated) DEVICE — DRSG TEGADERM 4X4 3/4" 1626W

## (undated) DEVICE — HOOD FLYTE W/PEELAWAY 408-800-100

## (undated) DEVICE — SOL NACL 0.9% IRRIG 1000ML BOTTLE 2F7124

## (undated) DEVICE — LINEN TOWEL PACK X6 WHITE 5487

## (undated) DEVICE — SU VICRYL 2-0 CT-1 27" UND J259H

## (undated) DEVICE — SYR 10ML FINGER CONTROL W/O NDL 309695

## (undated) DEVICE — NDL INSUFFLATION 13GA 120MM C2201

## (undated) DEVICE — PREP CHLORAPREP 26ML TINTED HI-LITE ORANGE 930815

## (undated) DEVICE — SOL WATER IRRIG 1000ML BOTTLE 2F7114

## (undated) DEVICE — SUCTION IRR SYSTEM W/O TIP INTERPULSE HANDPIECE 0210-100-000

## (undated) DEVICE — PEN MARKING SKIN W/LABELS 31145918

## (undated) DEVICE — SPONGE LAP 18X18" X8435

## (undated) DEVICE — SUCTION IRR STRYKERFLOW II W/TIP 250-070-520

## (undated) DEVICE — DRSG STERI STRIP 1X5" R1548

## (undated) DEVICE — DRAIN HEMOVAC RESERVOIR KIT 10FR 1/8" MED 00-2550-002-10

## (undated) DEVICE — ENDO TROCAR FIRST ENTRY KII FIOS Z-THRD 05X100MM CTF03

## (undated) DEVICE — PREP DURAPREP 26ML APL 8630

## (undated) DEVICE — SOL ISOPROPYL RUBBING ALCOHOL USP 70% 4OZ HDX-20 I0020

## (undated) DEVICE — ESU GROUND PAD UNIVERSAL W/O CORD

## (undated) DEVICE — DECANTER VIAL 2006S

## (undated) DEVICE — SOL NACL 0.9% INJ 1000ML BAG 2B1324X

## (undated) DEVICE — SU MONOCRYL 4-0 PS-2 18" UND Y496G

## (undated) DEVICE — SU VICRYL 1 CT-1 27" UND J261H

## (undated) DEVICE — BONE CEMENT MIXEVAC III HI VAC KIT  0206-015-000

## (undated) DEVICE — IMM LEG ELEVATOR 79-90191

## (undated) DEVICE — DRSG KERLIX FLUFFS X5

## (undated) DEVICE — Device

## (undated) DEVICE — LINEN TOWEL PACK X5 5464

## (undated) DEVICE — STRAP KNEE/BODY 31143004

## (undated) DEVICE — NDL 22GA 1.5"

## (undated) DEVICE — BONE CLEANING TIP INTERPULSE  0210-010-000

## (undated) DEVICE — BASIN SET MAJOR

## (undated) DEVICE — BLADE SAW SAGITTAL STRK 18X90X1.27MM HD SYS 6 6118-127-090

## (undated) DEVICE — SYR 05ML LL W/O NDL

## (undated) DEVICE — ENDO POUCH UNIVERSAL RETRIEVAL SYSTEM INZII 5MM CD003

## (undated) DEVICE — DRSG TEGADERM 4X10" 1627

## (undated) DEVICE — SU MONOCRYL 3-0 PS-1 27" Y936H

## (undated) DEVICE — GOWN XLG DISP 9545

## (undated) DEVICE — SU DERMABOND ADVANCED .7ML DNX12

## (undated) DEVICE — TOURNIQUET CUFF 24" REPRO GREEN 60-7070-104

## (undated) DEVICE — BLADE KNIFE SURG 15 371115

## (undated) DEVICE — ANTIFOG SOLUTION SEE SHARP 150M TROCAR SWABS 30978 (COI)

## (undated) DEVICE — ENDO TROCAR SLEEVE KII Z-THREADED 05X100MM CTS02

## (undated) DEVICE — GLOVE PROTEXIS W/NEU-THERA 7.5  2D73TE75

## (undated) DEVICE — ESU GROUND PAD ADULT W/CORD E7507

## (undated) DEVICE — DRSG DRAIN 2X2" 7087

## (undated) DEVICE — DRSG GAUZE 2X2" 8042

## (undated) RX ORDER — PROPOFOL 10 MG/ML
INJECTION, EMULSION INTRAVENOUS
Status: DISPENSED
Start: 2021-02-11

## (undated) RX ORDER — LIDOCAINE HYDROCHLORIDE 20 MG/ML
INJECTION, SOLUTION EPIDURAL; INFILTRATION; INTRACAUDAL; PERINEURAL
Status: DISPENSED
Start: 2021-02-11

## (undated) RX ORDER — FENTANYL CITRATE 50 UG/ML
INJECTION, SOLUTION INTRAMUSCULAR; INTRAVENOUS
Status: DISPENSED
Start: 2021-02-11

## (undated) RX ORDER — GLYCOPYRROLATE 0.2 MG/ML
INJECTION INTRAMUSCULAR; INTRAVENOUS
Status: DISPENSED
Start: 2021-02-11

## (undated) RX ORDER — LIDOCAINE HYDROCHLORIDE 10 MG/ML
INJECTION, SOLUTION INFILTRATION; PERINEURAL
Status: DISPENSED
Start: 2019-12-27

## (undated) RX ORDER — FENTANYL CITRATE 50 UG/ML
INJECTION, SOLUTION INTRAMUSCULAR; INTRAVENOUS
Status: DISPENSED
Start: 2021-01-08

## (undated) RX ORDER — TRIAMCINOLONE ACETONIDE 40 MG/ML
INJECTION, SUSPENSION INTRA-ARTICULAR; INTRAMUSCULAR
Status: DISPENSED
Start: 2020-10-12

## (undated) RX ORDER — SODIUM CHLORIDE, SODIUM LACTATE, POTASSIUM CHLORIDE, CALCIUM CHLORIDE 600; 310; 30; 20 MG/100ML; MG/100ML; MG/100ML; MG/100ML
INJECTION, SOLUTION INTRAVENOUS
Status: DISPENSED
Start: 2021-02-11

## (undated) RX ORDER — EPHEDRINE SULFATE 50 MG/ML
INJECTION, SOLUTION INTRAMUSCULAR; INTRAVENOUS; SUBCUTANEOUS
Status: DISPENSED
Start: 2021-02-11

## (undated) RX ORDER — ACETAMINOPHEN 325 MG/1
TABLET ORAL
Status: DISPENSED
Start: 2021-02-11

## (undated) RX ORDER — ONDANSETRON 2 MG/ML
INJECTION INTRAMUSCULAR; INTRAVENOUS
Status: DISPENSED
Start: 2021-02-11

## (undated) RX ORDER — DIPHENHYDRAMINE HYDROCHLORIDE 50 MG/ML
INJECTION INTRAMUSCULAR; INTRAVENOUS
Status: DISPENSED
Start: 2021-02-11

## (undated) RX ORDER — CELECOXIB 200 MG/1
CAPSULE ORAL
Status: DISPENSED
Start: 2021-01-08

## (undated) RX ORDER — DIPHENHYDRAMINE HYDROCHLORIDE 50 MG/ML
INJECTION INTRAMUSCULAR; INTRAVENOUS
Status: DISPENSED
Start: 2021-01-08

## (undated) RX ORDER — GLYCOPYRROLATE 0.2 MG/ML
INJECTION INTRAMUSCULAR; INTRAVENOUS
Status: DISPENSED
Start: 2021-01-08

## (undated) RX ORDER — CELECOXIB 200 MG/1
CAPSULE ORAL
Status: DISPENSED
Start: 2021-02-11

## (undated) RX ORDER — LIDOCAINE HYDROCHLORIDE 20 MG/ML
INJECTION, SOLUTION EPIDURAL; INFILTRATION; INTRACAUDAL; PERINEURAL
Status: DISPENSED
Start: 2021-01-08

## (undated) RX ORDER — PROPOFOL 10 MG/ML
INJECTION, EMULSION INTRAVENOUS
Status: DISPENSED
Start: 2025-06-20

## (undated) RX ORDER — FENTANYL CITRATE-0.9 % NACL/PF 10 MCG/ML
PLASTIC BAG, INJECTION (ML) INTRAVENOUS
Status: DISPENSED
Start: 2021-01-08

## (undated) RX ORDER — DEXAMETHASONE SODIUM PHOSPHATE 4 MG/ML
INJECTION, SOLUTION INTRA-ARTICULAR; INTRALESIONAL; INTRAMUSCULAR; INTRAVENOUS; SOFT TISSUE
Status: DISPENSED
Start: 2021-02-11

## (undated) RX ORDER — BUPIVACAINE HYDROCHLORIDE AND EPINEPHRINE 5; 5 MG/ML; UG/ML
INJECTION, SOLUTION PERINEURAL
Status: DISPENSED
Start: 2021-01-08

## (undated) RX ORDER — ONDANSETRON 2 MG/ML
INJECTION INTRAMUSCULAR; INTRAVENOUS
Status: DISPENSED
Start: 2025-06-20

## (undated) RX ORDER — CEFAZOLIN SODIUM 2 G/100ML
INJECTION, SOLUTION INTRAVENOUS
Status: DISPENSED
Start: 2021-01-08

## (undated) RX ORDER — ONDANSETRON 2 MG/ML
INJECTION INTRAMUSCULAR; INTRAVENOUS
Status: DISPENSED
Start: 2021-01-08

## (undated) RX ORDER — LIDOCAINE HYDROCHLORIDE 10 MG/ML
INJECTION, SOLUTION EPIDURAL; INFILTRATION; INTRACAUDAL; PERINEURAL
Status: DISPENSED
Start: 2020-10-12

## (undated) RX ORDER — SODIUM CHLORIDE, SODIUM LACTATE, POTASSIUM CHLORIDE, CALCIUM CHLORIDE 600; 310; 30; 20 MG/100ML; MG/100ML; MG/100ML; MG/100ML
INJECTION, SOLUTION INTRAVENOUS
Status: DISPENSED
Start: 2021-01-08

## (undated) RX ORDER — CEFAZOLIN SODIUM/WATER 2 G/20 ML
SYRINGE (ML) INTRAVENOUS
Status: DISPENSED
Start: 2025-06-20

## (undated) RX ORDER — TRANEXAMIC ACID 650 MG/1
TABLET ORAL
Status: DISPENSED
Start: 2021-02-11

## (undated) RX ORDER — SODIUM CHLORIDE 9 MG/ML
INJECTION, SOLUTION INTRAVENOUS
Status: DISPENSED
Start: 2025-06-20

## (undated) RX ORDER — HYDROMORPHONE HYDROCHLORIDE 1 MG/ML
INJECTION, SOLUTION INTRAMUSCULAR; INTRAVENOUS; SUBCUTANEOUS
Status: DISPENSED
Start: 2021-01-08

## (undated) RX ORDER — PROPOFOL 10 MG/ML
INJECTION, EMULSION INTRAVENOUS
Status: DISPENSED
Start: 2021-01-08

## (undated) RX ORDER — HYDROMORPHONE HYDROCHLORIDE 1 MG/ML
INJECTION, SOLUTION INTRAMUSCULAR; INTRAVENOUS; SUBCUTANEOUS
Status: DISPENSED
Start: 2021-02-11

## (undated) RX ORDER — ACETAMINOPHEN 325 MG/1
TABLET ORAL
Status: DISPENSED
Start: 2021-01-08

## (undated) RX ORDER — CEFAZOLIN SODIUM 2 G/100ML
INJECTION, SOLUTION INTRAVENOUS
Status: DISPENSED
Start: 2021-02-11

## (undated) RX ORDER — METRONIDAZOLE 500 MG/100ML
INJECTION, SOLUTION INTRAVENOUS
Status: DISPENSED
Start: 2025-06-20

## (undated) RX ORDER — FENTANYL CITRATE-0.9 % NACL/PF 10 MCG/ML
PLASTIC BAG, INJECTION (ML) INTRAVENOUS
Status: DISPENSED
Start: 2021-02-11

## (undated) RX ORDER — TRANEXAMIC ACID 650 MG/1
TABLET ORAL
Status: DISPENSED
Start: 2021-01-08

## (undated) RX ORDER — DEXAMETHASONE SODIUM PHOSPHATE 4 MG/ML
INJECTION, SOLUTION INTRA-ARTICULAR; INTRALESIONAL; INTRAMUSCULAR; INTRAVENOUS; SOFT TISSUE
Status: DISPENSED
Start: 2025-06-20

## (undated) RX ORDER — FENTANYL CITRATE 50 UG/ML
INJECTION, SOLUTION INTRAMUSCULAR; INTRAVENOUS
Status: DISPENSED
Start: 2025-06-20

## (undated) RX ORDER — OXYCODONE HYDROCHLORIDE 5 MG/1
TABLET ORAL
Status: DISPENSED
Start: 2025-06-20

## (undated) RX ORDER — DEXAMETHASONE SODIUM PHOSPHATE 4 MG/ML
INJECTION, SOLUTION INTRA-ARTICULAR; INTRALESIONAL; INTRAMUSCULAR; INTRAVENOUS; SOFT TISSUE
Status: DISPENSED
Start: 2021-01-08

## (undated) RX ORDER — FENTANYL CITRATE-0.9 % NACL/PF 10 MCG/ML
PLASTIC BAG, INJECTION (ML) INTRAVENOUS
Status: DISPENSED
Start: 2025-06-20

## (undated) RX ORDER — GLYCOPYRROLATE 0.2 MG/ML
INJECTION, SOLUTION INTRAMUSCULAR; INTRAVENOUS
Status: DISPENSED
Start: 2025-06-20

## (undated) RX ORDER — BUPIVACAINE HYDROCHLORIDE 2.5 MG/ML
INJECTION, SOLUTION EPIDURAL; INFILTRATION; INTRACAUDAL; PERINEURAL
Status: DISPENSED
Start: 2025-06-20

## (undated) RX ORDER — BUPIVACAINE HYDROCHLORIDE AND EPINEPHRINE 5; 5 MG/ML; UG/ML
INJECTION, SOLUTION PERINEURAL
Status: DISPENSED
Start: 2021-02-11

## (undated) RX ORDER — ACETAMINOPHEN 325 MG/1
TABLET ORAL
Status: DISPENSED
Start: 2025-06-20

## (undated) RX ORDER — DIPHENHYDRAMINE HYDROCHLORIDE 50 MG/ML
INJECTION, SOLUTION INTRAMUSCULAR; INTRAVENOUS
Status: DISPENSED
Start: 2025-06-20